# Patient Record
Sex: MALE | Race: WHITE | NOT HISPANIC OR LATINO | Employment: OTHER | URBAN - METROPOLITAN AREA
[De-identification: names, ages, dates, MRNs, and addresses within clinical notes are randomized per-mention and may not be internally consistent; named-entity substitution may affect disease eponyms.]

---

## 2017-06-16 ENCOUNTER — TRANSFERRED RECORDS (OUTPATIENT)
Dept: HEALTH INFORMATION MANAGEMENT | Facility: CLINIC | Age: 62
End: 2017-06-16

## 2017-07-19 ENCOUNTER — TRANSFERRED RECORDS (OUTPATIENT)
Dept: HEALTH INFORMATION MANAGEMENT | Facility: CLINIC | Age: 62
End: 2017-07-19

## 2017-07-19 LAB
CHOLEST SERPL-MCNC: 133 MG/DL (ref 100–199)
HDLC SERPL-MCNC: 41 MG/DL
LDLC SERPL CALC-MCNC: 77 MG/DL (ref 0–99)
TRIGL SERPL-MCNC: 74 MG/DL (ref 0–149)

## 2017-09-08 DIAGNOSIS — R35.0 URINARY FREQUENCY: ICD-10-CM

## 2017-09-08 NOTE — TELEPHONE ENCOUNTER
Reason for Call:  Medication or medication refill:    Do you use a [facility]?  Name of the pharmacy and phone number for the current request:       [facility] - [place], [place] - [address] AT PORTAL TO REGISTERED [facility] SITES    Name of the medication requested: alfuzosin (UROXATRAL) 10 MG 24 hr tablet    Other request: The patient did schedule a Physical 10/5    Can we leave a detailed message on this number? YES    Phone number patient can be reached at: Home number on file [phone] (home)    Best Time: anytime    Call taken on 9/8/2017 at 2:43 PM by Sonali Shahid

## 2017-09-11 RX ORDER — ALFUZOSIN HYDROCHLORIDE 10 MG/1
10 TABLET, EXTENDED RELEASE ORAL DAILY
Qty: 90 TABLET | Refills: 0 | Status: SHIPPED | OUTPATIENT
Start: 2017-09-11 | End: 2017-10-05

## 2017-09-11 NOTE — TELEPHONE ENCOUNTER
alfuzosin (UROXATRAL) 10 MG 24 hr tablet           Last Written Prescription Date: 9/15/2016  Last Fill Quantity: 90, # refills: 3    Last Office Visit with [facility], [facility] or [facility] prescribing provider:  9/15/2016   Future Office Visit:    Next 5 appointments (look out 90 days)     Oct 05, 2017  9:00 AM CDT   [facility] with Wu Jorge MD   [facility]    [address] [facility] [address]   [phone]                  BP Readings from Last 3 Encounters:   09/15/16 138/88   03/09/15 126/84   11/19/13 128/86

## 2017-09-11 NOTE — TELEPHONE ENCOUNTER
Prescription approved per [facility] Refill Protocol.  Further refills to be addressed 10/5/17 with Dr. Jorge.     Karen Mejia RN

## 2017-09-15 ENCOUNTER — TRANSFERRED RECORDS (OUTPATIENT)
Dept: HEALTH INFORMATION MANAGEMENT | Facility: CLINIC | Age: 62
End: 2017-09-15

## 2017-10-04 NOTE — PROGRESS NOTES
SUBJECTIVE:   CC: Mau Gómez is an 62 year old male who presents for preventative health visit.     The patient has reg onc follow up and stable, Dr. Reed.  Has h/o niddm not on meds for that, does not check sugar.  No recent asthma issues.                 Past Medical History:      Past Medical History:   Diagnosis Date     A-fib (H) 2008    ablation therapy     Cavernous hemangioma 11/12    of liver     Chest pain 2002    ct neg for pe but ?liver mass     CML (chronic myelocytic leukaemia) 11/13    Dr. Quinones then Dr. Aramis Reed in [place]     Colonic polyp 2014    [place] gi, fu 3 years     Hemangioma of liver 2003    seen on ct chest then ct liver showed it     HTN (hypertension)      Hx of colonoscopy 2003    nl, fu 2014 2 polyps and to fu 3 years per [place] gi     Hypercholesteremia      Intermittent asthma     had fu Dr. Omid Schwartz and using zithromax in winter and since fine     Kidney stone 7/12     Lung nodule 7/12    seen on ct for kidney stone, fu 1 year, fu done Nov 13 and to fu 1 year, fu done 3/15 and gone, no fu needed     LVH (left ventricular hypertrophy) 2002    echo done for sob     Normal coronary angiogram 2008    done for cp, less then 10% lad, otherwise nl     Shingles 1990's     Type II or unspecified type diabetes mellitus without mention of complication, not stated as uncontrolled              Past Surgical History:      Past Surgical History:   Procedure Laterality Date     APPENDECTOMY       BONE MARROW ASPIRATION ONLY  11/14/2013    Procedure: BONE MARROW ASPIRATION ONLY;  BONE MARROW BIOPSY;  Surgeon: Wilner Salazar MD;  Location:  GI     TONSILLECTOMY       wisdom teeth removed               Social History:     Social History     Social History     Marital status:      Spouse name: N/A     Number of children: 1     Years of education: N/A     Occupational History     commercial real estate [employer]     Social History Main Topics     Smoking status: Former Smoker      Quit date: 12/1/2007     Smokeless tobacco: Never Used      Comment: minimal history     Alcohol use No     Drug use: No     Sexual activity: Yes     Partners: Female     Other Topics Concern     Not on file     Social History Narrative             Family History:   reviewed         Allergies:     Allergies   Allergen Reactions     Alcohol      Aspirin Hives     Codeine Sulfate      Penicillins              Medications:     Current Outpatient Prescriptions   Medication Sig Dispense Refill     alfuzosin (UROXATRAL) 10 MG 24 hr tablet Take 1 tablet (10 mg) by mouth daily 90 tablet 3     pravastatin (PRAVACHOL) 40 MG tablet Take 1 tablet (40 mg) by mouth daily 90 tablet 3     [DISCONTINUED] alfuzosin (UROXATRAL) 10 MG 24 hr tablet Take 1 tablet (10 mg) by mouth daily 90 tablet 0     Cholecalciferol (VITAMIN D3 PO) Take 2,000 Units by mouth daily       ascorbic acid 500 MG TABS        Glucosamine-Chondroitin (GLUCOSAMINE CHONDR COMPLEX PO) Take 1,500 mg by mouth       ACE/ARB NOT PRESCRIBED, INTENTIONAL, continuous prn. ACE & ARB not prescribed due to Other: BP controlled       fexofenadine (ALLEGRA) 180 MG tablet Take 180 mg by mouth daily.       azithromycin (ZITHROMAX) 250 MG tablet Take 250 mg by mouth every other day.       ASPIRIN NOT PRESCRIBED, INTENTIONAL, continuous prn. Antiplatelet medication not prescribed intentionally due to Allergy 0 each 0     Saw Palmetto 160 MG TABS Take  by mouth daily.       Multiple Vitamin (DAILY MULTIVITAMIN PO) Take  by mouth daily.       [DISCONTINUED] pravastatin (PRAVACHOL) 40 MG tablet Take 1 tablet (40 mg) by mouth daily 90 tablet 3     [DISCONTINUED] pravastatin (PRAVACHOL) 40 MG tablet Take 1 tablet (40 mg) by mouth daily Fasting office visit with labs needed before medication runs out. 90 tablet 3     UNABLE TO FIND MEDICATION NAME: gleevac 600 mg daily                 Review of Systems:   The 10 point Review of Systems is negative other than noted in the HPI            "Physical Exam:   Blood pressure 134/88, pulse 87, temperature 97.7  F (36.5  C), temperature source Oral, height 5' 11.4\" (1.814 m), weight 242 lb (109.8 kg), SpO2 97 %.    Exam:  Constitutional: healthy appearing, alert and in no distress  Heent: Normocephalic. Head without obvious masses or lesions. PERRLDC, EOMI. Mouth exam within normal limits: tongue, mucous membranes, posterior pharynx all normal, no lesions or abnormalities seen.  Tm's and canals within normal limits bilaterally. Neck supple, no nuchal rigidity or masses. No supraclavicular, or cervical adenopathy. Thyroid symmetric, no masses.  Cardiovascular: Regular rate and rhythm, no murmer, rub or gallops.  JVP not elevated, no edema.  Carotids within normal limits bilaterally, no bruits.  Respiratory: Normal respiratory effort.  Lungs clear, normal flow, no wheezing or crackles.  Breasts: Normal bilaterally.  No masses or lesions.  Nipples within normal limites.  No axillary lesions or nodes.  Gastrointestinal: Normal active bowel sounds.   Soft, not tender, no masses, guarding or rebound.  No hepatosplenomegaly.   Genitourinary: Rectal mod bph, smooth  Musculoskeletal: extremities normal, no gross deformities noted.  Skin: no suspicious lesions or rashes   Neurologic: Mental status within normal limits.  Speech fluent.  No gross motor abnormalities and gait intact.  Psychiatric: mentation appears normal and affect normal.  Feet within normal limits, no sores, intact pulses         Data:   Labs sent, no cbc as just done        Assessment:   1. Normal cpx  2. Diabetes, not on meds, feet fine and up to date eye exam, exercise, diet and weight loss rec  3. Cml, follow up onc  4. Afib, no recurrence  5. Colon polyp, to follow up this year  6. Lung nodule, no follow up needed  7. Lvh, blood pressure fine  8. Health care maintenance  9. Asthma, controlled  10. Chronic cough, fine on zmax qod         Plan:   Up to date immunizations  Letter with " labs  Exercise, diet and weight loss  Follow up onc  Colonoscopy rec  Call if problems      Wu Jorge M.D.        Healthy Habits:    Do you get at least three servings of calcium containing foods daily (dairy, green leafy vegetables, etc.)? yes    Amount of exercise or daily activities, outside of work: 3 days 60-120mins day(s) per week    Problems taking medications regularly No    Medication side effects: No    Have you had an eye exam in the past two years? yes    Do you see a dentist twice per year? yes    Do you have sleep apnea, excessive snoring or daytime drowsiness?when weight increases Sleep apnea SX's arise            Today's PHQ-2 Score:   PHQ-2 ( 1999 Pfizer) 9/15/2016 11/12/2013   Q1: Little interest or pleasure in doing things 0 0   Q2: Feeling down, depressed or hopeless 0 0   PHQ-2 Score 0 0         Abuse: Current or Past(Physical, Sexual or Emotional)- NO  Do you feel safe in your environment - YES

## 2017-10-05 ENCOUNTER — OFFICE VISIT (OUTPATIENT)
Dept: FAMILY MEDICINE | Facility: CLINIC | Age: 62
End: 2017-10-05
Payer: COMMERCIAL

## 2017-10-05 VITALS
SYSTOLIC BLOOD PRESSURE: 134 MMHG | DIASTOLIC BLOOD PRESSURE: 88 MMHG | HEART RATE: 87 BPM | BODY MASS INDEX: 33.88 KG/M2 | HEIGHT: 71 IN | OXYGEN SATURATION: 97 % | WEIGHT: 242 LBS | TEMPERATURE: 97.7 F

## 2017-10-05 DIAGNOSIS — R05.3 CHRONIC COUGH: ICD-10-CM

## 2017-10-05 DIAGNOSIS — I51.7 LVH (LEFT VENTRICULAR HYPERTROPHY): ICD-10-CM

## 2017-10-05 DIAGNOSIS — J45.20 INTERMITTENT ASTHMA, UNCOMPLICATED: ICD-10-CM

## 2017-10-05 DIAGNOSIS — E66.9 NON MORBID OBESITY: ICD-10-CM

## 2017-10-05 DIAGNOSIS — R35.0 URINARY FREQUENCY: ICD-10-CM

## 2017-10-05 DIAGNOSIS — C92.10 CHRONIC MYELOID LEUKEMIA (H): ICD-10-CM

## 2017-10-05 DIAGNOSIS — I10 BENIGN ESSENTIAL HYPERTENSION: ICD-10-CM

## 2017-10-05 DIAGNOSIS — I48.91 ATRIAL FIBRILLATION, UNSPECIFIED TYPE (H): ICD-10-CM

## 2017-10-05 DIAGNOSIS — E78.5 HYPERLIPIDEMIA LDL GOAL <100: ICD-10-CM

## 2017-10-05 DIAGNOSIS — R91.1 LUNG NODULE: ICD-10-CM

## 2017-10-05 DIAGNOSIS — K63.5 POLYP OF COLON, UNSPECIFIED PART OF COLON, UNSPECIFIED TYPE: ICD-10-CM

## 2017-10-05 DIAGNOSIS — Z23 NEED FOR PROPHYLACTIC VACCINATION AND INOCULATION AGAINST INFLUENZA: ICD-10-CM

## 2017-10-05 DIAGNOSIS — E11.9 TYPE 2 DIABETES MELLITUS WITHOUT COMPLICATION, WITHOUT LONG-TERM CURRENT USE OF INSULIN (H): ICD-10-CM

## 2017-10-05 DIAGNOSIS — Z00.00 ROUTINE GENERAL MEDICAL EXAMINATION AT A HEALTH CARE FACILITY: Primary | ICD-10-CM

## 2017-10-05 LAB
ALBUMIN SERPL-MCNC: 4.1 G/DL (ref 3.4–5)
ALP SERPL-CCNC: 77 U/L (ref 40–150)
ALT SERPL W P-5'-P-CCNC: 43 U/L (ref 0–70)
ANION GAP SERPL CALCULATED.3IONS-SCNC: 8 MMOL/L (ref 3–14)
AST SERPL W P-5'-P-CCNC: 34 U/L (ref 0–45)
BILIRUB SERPL-MCNC: 0.9 MG/DL (ref 0.2–1.3)
BUN SERPL-MCNC: 13 MG/DL (ref 7–30)
CALCIUM SERPL-MCNC: 8.7 MG/DL (ref 8.5–10.1)
CHLORIDE SERPL-SCNC: 107 MMOL/L (ref 94–109)
CHOLEST SERPL-MCNC: 121 MG/DL
CO2 SERPL-SCNC: 25 MMOL/L (ref 20–32)
CREAT SERPL-MCNC: 1.34 MG/DL (ref 0.66–1.25)
CREAT UR-MCNC: 294 MG/DL
GFR SERPL CREATININE-BSD FRML MDRD: 54 ML/MIN/1.7M2
GLUCOSE SERPL-MCNC: 144 MG/DL (ref 70–99)
HBA1C MFR BLD: 5.8 % (ref 4.3–6)
HDLC SERPL-MCNC: 37 MG/DL
LDLC SERPL CALC-MCNC: 60 MG/DL
MICROALBUMIN UR-MCNC: 91 MG/L
MICROALBUMIN/CREAT UR: 31.05 MG/G CR (ref 0–17)
NONHDLC SERPL-MCNC: 84 MG/DL
POTASSIUM SERPL-SCNC: 4 MMOL/L (ref 3.4–5.3)
PROT SERPL-MCNC: 6.6 G/DL (ref 6.8–8.8)
SODIUM SERPL-SCNC: 140 MMOL/L (ref 133–144)
TRIGL SERPL-MCNC: 122 MG/DL

## 2017-10-05 PROCEDURE — 83036 HEMOGLOBIN GLYCOSYLATED A1C: CPT | Performed by: INTERNAL MEDICINE

## 2017-10-05 PROCEDURE — 90686 IIV4 VACC NO PRSV 0.5 ML IM: CPT | Performed by: INTERNAL MEDICINE

## 2017-10-05 PROCEDURE — 80061 LIPID PANEL: CPT | Performed by: INTERNAL MEDICINE

## 2017-10-05 PROCEDURE — 99396 PREV VISIT EST AGE 40-64: CPT | Mod: 25 | Performed by: INTERNAL MEDICINE

## 2017-10-05 PROCEDURE — 82043 UR ALBUMIN QUANTITATIVE: CPT | Performed by: INTERNAL MEDICINE

## 2017-10-05 PROCEDURE — 90471 IMMUNIZATION ADMIN: CPT | Performed by: INTERNAL MEDICINE

## 2017-10-05 PROCEDURE — 80053 COMPREHEN METABOLIC PANEL: CPT | Performed by: INTERNAL MEDICINE

## 2017-10-05 PROCEDURE — 36415 COLL VENOUS BLD VENIPUNCTURE: CPT | Performed by: INTERNAL MEDICINE

## 2017-10-05 RX ORDER — PRAVASTATIN SODIUM 40 MG
40 TABLET ORAL DAILY
Qty: 90 TABLET | Refills: 3 | Status: SHIPPED | OUTPATIENT
Start: 2017-10-05 | End: 2019-01-04

## 2017-10-05 RX ORDER — FOLIC ACID 0.8 MG
1 TABLET ORAL DAILY
COMMUNITY

## 2017-10-05 RX ORDER — ALFUZOSIN HYDROCHLORIDE 10 MG/1
10 TABLET, EXTENDED RELEASE ORAL DAILY
Qty: 90 TABLET | Refills: 3 | Status: SHIPPED | OUTPATIENT
Start: 2017-10-05 | End: 2019-01-04

## 2017-10-05 NOTE — MR AVS SNAPSHOT
After Visit Summary   10/5/2017    Mau Gómez    MRN: [id number]           Patient Information     Date Of Birth          1955        Visit Information        Provider Department      10/5/2017 9:00 AM Wu Jorge MD [facility]        Today's Diagnoses     Routine general medical examination at a health care facility    -  1    Type 2 diabetes mellitus without complication, without long-term current use of insulin (H)        Chronic myeloid leukemia (H)        Atrial fibrillation, unspecified type (H)        Intermittent asthma, uncomplicated        Hyperlipidemia LDL goal <100        LVH (left ventricular hypertrophy)        Benign essential hypertension        Lung nodule        Polyp of colon, unspecified part of colon, unspecified type        Urinary frequency        Non morbid obesity        Need for prophylactic vaccination and inoculation against influenza          Care Instructions      Preventive Health Recommendations  Male Ages 50 - 64    Yearly exam:             See your health care provider every year in order to  o   Review health changes.   o   Discuss preventive care.    o   Review your medicines if your doctor has prescribed any.     Have a cholesterol test every 5 years, or more frequently if you are at risk for high cholesterol/heart disease.     Have a diabetes test (fasting glucose) every three years. If you are at risk for diabetes, you should have this test more often.     Have a colonoscopy at age 50, or have a yearly FIT test (stool test). These exams will check for colon cancer.      Talk with your health care provider about whether or not a prostate cancer screening test (PSA) is right for you.    You should be tested each year for STDs (sexually transmitted diseases), if you re at risk.     Shots: Get a flu shot each year. Get a tetanus shot every 10 years.     Nutrition:    Eat at least 5 servings of fruits and vegetables daily.     Eat  "whole-grain bread, whole-wheat pasta and brown rice instead of white grains and rice.     Talk to your provider about Calcium and Vitamin D.     Lifestyle    Exercise for at least 150 minutes a week (30 minutes a day, 5 days a week). This will help you control your weight and prevent disease.     Limit alcohol to one drink per day.     No smoking.     Wear sunscreen to prevent skin cancer.     See your dentist every six months for an exam and cleaning.     See your eye doctor every 1 to 2 years.            Follow-ups after your visit        Who to contact     If you have questions or need follow up information about today's clinic visit or your schedule please contact [facility] directly at [phone].  Normal or non-critical lab and imaging results will be communicated to you by CelebCallshart, letter or phone within 4 business days after the clinic has received the results. If you do not hear from us within 7 days, please contact the clinic through CelebCallshart or phone. If you have a critical or abnormal lab result, we will notify you by phone as soon as possible.  Submit refill requests through [employer] or call your pharmacy and they will forward the refill request to us. Please allow 3 business days for your refill to be completed.          Additional Information About Your Visit        CelebCallsharTeamly Information     [employer] lets you send messages to your doctor, view your test results, renew your prescriptions, schedule appointments and more. To sign up, go to [email or web address] . Click on \"Log in\" on the left side of the screen, which will take you to the Welcome page. Then click on \"Sign up Now\" on the right side of the page.     You will be asked to enter the access code listed below, as well as some personal information. Please follow the directions to create your username and password.     Your access code is: [email or web address]  Expires: 1/3/2018  9:32 AM     Your access code will  in 90 days. If you need " "help or a new code, please call your [facility] or [phone].        Care EveryWhere ID     This is your Care EveryWhere ID. This could be used by other organizations to access your [facility] medical records  [id number]        Your Vitals Were     Pulse Temperature Height Pulse Oximetry BMI (Body Mass Index)       87 97.7  F (36.5  C) (Oral) 5' 11.4\" (1.814 m) 97% 33.38 kg/m2        Blood Pressure from Last 3 Encounters:   10/05/17 134/88   09/15/16 138/88   03/09/15 126/84    Weight from Last 3 Encounters:   10/05/17 242 lb (109.8 kg)   09/15/16 238 lb (108 kg)   03/09/15 240 lb 6.4 oz (109 kg)              We Performed the Following     Albumin Random Urine Quantitative with Creat Ratio     C FOOT EXAM  NO CHARGE     Comprehensive metabolic panel     HC FLU VAC PRESRV FREE QUAD SPLIT VIR 3+YRS IM     Hemoglobin A1c     Lipid panel reflex to direct LDL          Today's Medication Changes          These changes are accurate as of: 10/5/17  9:32 AM.  If you have any questions, ask your nurse or doctor.               These medicines have changed or have updated prescriptions.        Dose/Directions    pravastatin 40 MG tablet   Commonly known as:  PRAVACHOL   This may have changed:  Another medication with the same name was removed. Continue taking this medication, and follow the directions you see here.   Used for:  Hyperlipidemia LDL goal <100   Changed by:  Wu Jorge MD        Dose:  40 mg   Take 1 tablet (40 mg) by mouth daily   Quantity:  90 tablet   Refills:  3            Where to get your medicines      [facility] [place], [place] - [address] AT Portal to Registered [facility] Sites  [address], [facility] [address]     Phone:  [phone]     alfuzosin 10 MG 24 hr tablet    pravastatin 40 MG tablet                Primary Care Provider Office Phone # Fax #    Wu Jorge [phone] [address] [facility] [address]  [facility] [address]        Equal Access to Services     [place] : Hadii jordan kwan albertinaindira Soguilhermeali, wajagjitda luqerikha, qaleeannta kajackieadiel cheunggiovannaadiel, sixto acevedogaladean day. So [facility] [phone].    ATENCIÓN: Si habla español, tiene a bean disposición servicios gratuitos de asistencia lingüística. Llame al [phone].    We comply with applicable federal civil rights laws and [place] laws. We do not discriminate on the basis of race, color, national origin, age, disability, sex, sexual orientation, or gender identity.            Thank you!     Thank you for choosing [facility]  for your care. Our goal is always to provide you with excellent care. Hearing back from our patients is one way we can continue to improve our services. Please take a few minutes to complete the written survey that you may receive in the mail after your visit with us. Thank you!             Your Updated Medication List - Protect others around you: Learn how to safely use, store and throw away your medicines at www.disposemymeds.org.          This list is accurate as of: 10/5/17  9:32 AM.  Always use your most recent med list.                   Brand Name Dispense Instructions for use Diagnosis    * ACE/ARB NOT PRESCRIBED (INTENTIONAL)      continuous prn. ACE & ARB not prescribed due to Other: BP controlled        alfuzosin 10 MG 24 hr tablet    UROXATRAL    90 tablet    Take 1 tablet (10 mg) by mouth daily    Urinary frequency       ALLEGRA 180 MG tablet   Generic drug:  fexofenadine      Take 180 mg by mouth daily.        ascorbic acid 500 MG Tabs           * ASPIRIN NOT PRESCRIBED    INTENTIONAL    0 each    continuous prn. Antiplatelet medication not prescribed intentionally due to Allergy    Type 2 diabetes, HbA1c goal < 7% (H)       DAILY MULTIVITAMIN PO      Take  by mouth daily.    Intermittent asthma       GLUCOSAMINE CHONDR COMPLEX PO      Take 1,500 mg by mouth        pravastatin 40 MG tablet     PRAVACHOL    90 tablet    Take 1 tablet (40 mg) by mouth daily    Hyperlipidemia LDL goal <100       Saw Palmetto 160 MG Tabs      Take  by mouth daily.    Intermittent asthma       UNABLE TO FIND      MEDICATION NAME: gleevac 600 mg daily        VITAMIN D3 PO      Take 2,000 Units by mouth daily        ZITHROMAX 250 MG tablet   Generic drug:  azithromycin      Take 250 mg by mouth every other day.        * Notice:  This list has 2 medication(s) that are the same as other medications prescribed for you. Read the directions carefully, and ask your doctor or other care provider to review them with you.

## 2017-10-05 NOTE — LETTER
[place] [address] [facility]  [address]  [place]  [address]  Tel: [phone]    October 6, 2017    Mau Gómez  [address] [place] [address]        Dear Mr. Gómez,    For the most part your labs look good but there are a couple of things just a bit off.  Your urine has a small amount of protein in it and your creatinine or kidney test remains a bit high.  This may simply be due to your prior kidney stones and high blood pressure but I would like to do a couple more tests just to be sure.  I would like to get a bit more urine here and then also do a kidney ultrasound at radiology.  If these are fine we can just follow this.    Your sugar is high but the 2nd diabetes test called the a1c is normal.  The bottom line is that you are higher risk of getting diabetes and you need to exercise and get your weight down to lower this.    Your other labs look very good including your blood salts, liver tests, proteins and cholesterol.      If you have any further questions or problems, please contact our office.      Sincerely,    Wu Jorge MD/ Lidia BELLO CMA  Results for orders placed or performed in visit on 10/05/17   Comprehensive metabolic panel   Result Value Ref Range    Sodium 140 133 - 144 mmol/L    Potassium 4.0 3.4 - 5.3 mmol/L    Chloride 107 94 - 109 mmol/L    Carbon Dioxide 25 20 - 32 mmol/L    Anion Gap 8 3 - 14 mmol/L    Glucose 144 (H) 70 - 99 mg/dL    Urea Nitrogen 13 7 - 30 mg/dL    Creatinine 1.34 (H) 0.66 - 1.25 mg/dL    GFR Estimate 54 (L) >60 mL/min/1.7m2    GFR Estimate If Black 65 >60 mL/min/1.7m2    Calcium 8.7 8.5 - 10.1 mg/dL    Bilirubin Total 0.9 0.2 - 1.3 mg/dL    Albumin 4.1 3.4 - 5.0 g/dL    Protein Total 6.6 (L) 6.8 - 8.8 g/dL    Alkaline Phosphatase 77 40 - 150 U/L    ALT 43 0 - 70 U/L    AST 34 0 - 45 U/L   Hemoglobin A1c   Result Value Ref Range    Hemoglobin A1C 5.8 4.3 - 6.0 %   Lipid panel reflex to direct LDL   Result Value Ref Range    Cholesterol  121 <200 mg/dL    Triglycerides 122 <150 mg/dL    HDL Cholesterol 37 (L) >39 mg/dL    LDL Cholesterol Calculated 60 <100 mg/dL    Non HDL Cholesterol 84 <130 mg/dL   Albumin Random Urine Quantitative with Creat Ratio   Result Value Ref Range    Creatinine Urine 294 mg/dL    Albumin Urine mg/L 91 mg/L    Albumin Urine mg/g Cr 31.05 (H) 0 - 17 mg/g Cr               Enclosure: Lab Results

## 2017-10-05 NOTE — NURSING NOTE
"Chief Complaint   Patient presents with     Physical       Initial /88 (BP Location: Left arm, Patient Position: Sitting, Cuff Size: Adult Large)  Pulse 87  Temp 97.7  F (36.5  C) (Oral)  Ht 5' 11.4\" (1.814 m)  Wt 242 lb (109.8 kg)  SpO2 97%  BMI 33.38 kg/m2 Estimated body mass index is 33.38 kg/(m^2) as calculated from the following:    Height as of this encounter: 5' 11.4\" (1.814 m).    Weight as of this encounter: 242 lb (109.8 kg).  Medication Reconciliation: complete   Mlalika Baezing- CMA      "

## 2017-10-05 NOTE — PROGRESS NOTES
Injectable Influenza Immunization Documentation    1.  Is the person to be vaccinated sick today?   No    2. Does the person to be vaccinated have an allergy to a component   of the vaccine?   No    3. Has the person to be vaccinated ever had a serious reaction   to influenza vaccine in the past?   No    4. Has the person to be vaccinated ever had Guillain-Barré syndrome?   No    Form completed by Mallika Britton CMA  Prior to injection verified patient identity using patient's name and date of birth.

## 2017-10-06 NOTE — PROGRESS NOTES
It was a pleasure seeing you.  I wanted to get back to you with your test results.  I have enclosed a copy for your records.    For the most part your labs look good but there are a couple of things just a bit off.  Your urine has a small amount of protein in it and your creatinine or kidney test remains a bit high.  This may simply be due to your prior kidney stones and high blood pressure but I would like to do a couple more tests just to be sure.  I would like to get a bit more urine here and then also do a kidney ultrasound at radiology.  If these are fine we can just follow this.    Your sugar is high but the 2nd diabetes test called the a1c is normal.  The bottom line is that you are higher risk of getting diabetes and you need to exercise and get your weight down to lower this.    Your other labs look very good including your blood salts, liver tests, proteins and cholesterol.      Please work hard on the exercise and weight.  I would like to order the kidney ultrasound and urine study but I wanted to wait for you to get this letter first so please call me and let me know you got it and that it is ok to order these things.    If you have any questions please call me.

## 2017-11-29 DIAGNOSIS — R35.0 URINARY FREQUENCY: ICD-10-CM

## 2017-11-30 RX ORDER — ALFUZOSIN HYDROCHLORIDE 10 MG/1
TABLET, EXTENDED RELEASE ORAL
Qty: 90 TABLET | Refills: 0 | OUTPATIENT
Start: 2017-11-30

## 2018-01-14 ENCOUNTER — TRANSFERRED RECORDS (OUTPATIENT)
Dept: HEALTH INFORMATION MANAGEMENT | Facility: CLINIC | Age: 63
End: 2018-01-14

## 2018-01-26 ENCOUNTER — TRANSFERRED RECORDS (OUTPATIENT)
Dept: HEALTH INFORMATION MANAGEMENT | Facility: CLINIC | Age: 63
End: 2018-01-26

## 2018-06-18 ENCOUNTER — TRANSFERRED RECORDS (OUTPATIENT)
Dept: HEALTH INFORMATION MANAGEMENT | Facility: CLINIC | Age: 63
End: 2018-06-18

## 2018-09-06 ENCOUNTER — OFFICE VISIT (OUTPATIENT)
Dept: FAMILY MEDICINE | Facility: CLINIC | Age: 63
End: 2018-09-06
Payer: MEDICARE

## 2018-09-06 VITALS
DIASTOLIC BLOOD PRESSURE: 99 MMHG | BODY MASS INDEX: 32.37 KG/M2 | OXYGEN SATURATION: 96 % | HEART RATE: 79 BPM | HEIGHT: 72 IN | TEMPERATURE: 97.3 F | WEIGHT: 239 LBS | SYSTOLIC BLOOD PRESSURE: 154 MMHG

## 2018-09-06 DIAGNOSIS — M79.671 PAIN OF RIGHT HEEL: Primary | ICD-10-CM

## 2018-09-06 DIAGNOSIS — M26.609 TMJ (TEMPOROMANDIBULAR JOINT SYNDROME): ICD-10-CM

## 2018-09-06 DIAGNOSIS — M79.9 SOFT TISSUE LESION: ICD-10-CM

## 2018-09-06 PROCEDURE — 99214 OFFICE O/P EST MOD 30 MIN: CPT | Performed by: NURSE PRACTITIONER

## 2018-09-06 ASSESSMENT — PAIN SCALES - GENERAL: PAINLEVEL: MILD PAIN (2)

## 2018-09-06 NOTE — MR AVS SNAPSHOT
After Visit Summary   9/6/2018    Mau Gómez    MRN: [id number]           Patient Information     Date Of Birth          1955        Visit Information        Provider Department      9/6/2018 10:30 AM Natalie Nelson APRN [facility]        Today's Diagnoses     Pain of right heel    -  1    Soft tissue lesion           Follow-ups after your visit        Additional Services     GENERAL SURG ADULT REFERRAL       Your provider [place] [facility] [facility] [phone]   [email or web address]    Please be aware that coverage of these services is subject to the terms and limitations of your health insurance plan.  Call member services at your health plan with any benefit or coverage questions.      Please bring the following with you to your appointment:    (1) Any X-Rays, CTs or MRIs which have been performed.  Contact the facility where they were done to arrange for  prior to your scheduled appointment.   (2) List of current medications   (3) This referral request   (4) Any documents/labs given to you for this referral            PODIATRY/FOOT & ANKLE SURGERY REFERRAL       Your provider [place] you [place] [facility] [facility] [phone]   [email or web address]    Please be aware that coverage of these services is subject to the terms and limitations of your health insurance plan.  Call member services at your health plan with any benefit or coverage questions.      Please bring the following to your appointment:  >>   Any x-rays, CTs or MRIs which have been performed.  Contact the facility where they were done to arrange for  prior to your scheduled appointment.    >>   List of current medications   >>   This referral request   >>   Any documents/labs given to you for this referral                  Who to contact     If you have questions or need follow up  "information about today's clinic visit or your schedule please contact [facility] directly at [phone].  Normal or non-critical lab and imaging results will be communicated to you by MyChart, letter or phone within 4 business days after the clinic has received the results. If you do not hear from us within 7 days, please contact the clinic through MyChart or phone. If you have a critical or abnormal lab result, we will notify you by phone as soon as possible.  Submit refill requests through [employer] or call your pharmacy and they will forward the refill request to us. Please allow 3 business days for your refill to be completed.          Additional Information About Your Visit        Care EveryWhere ID     This is your Care EveryWhere ID. This could be used by other organizations to access your [place] medical records  [id number]        Your Vitals Were     Pulse Temperature Height Pulse Oximetry BMI (Body Mass Index)       79 97.3  F (36.3  C) (Tympanic) 5' 11.5\" (1.816 m) 96% 32.87 kg/m2        Blood Pressure from Last 3 Encounters:   09/06/18 (!) 154/99   10/05/17 134/88   09/15/16 138/88    Weight from Last 3 Encounters:   09/06/18 239 lb (108.4 kg)   10/05/17 242 lb (109.8 kg)   09/15/16 238 lb (108 kg)              We Performed the Following     GENERAL SURG ADULT REFERRAL     PODIATRY/FOOT & ANKLE SURGERY REFERRAL        Primary Care Provider Office Phone # Fax #    Wu Jorge [phone] [address] [address]        Equal Access to Services     [facility]: Hadii aad ku hadasho Soomaali, waaxda luqadaha, qaybta kaalmada sixto taylor. So [facility] [phone].    ATENCIÓN: Si habla español, tiene a bean disposición servicios gratuitos de asistencia lingüística. Muna al [phone].    We comply with applicable federal civil rights laws and [place] laws. We do not discriminate on the basis of race, " color, national origin, age, disability, sex, sexual orientation, or gender identity.            Thank you!     Thank you for choosing [facility]  for your care. Our goal is always to provide you with excellent care. Hearing back from our patients is one way we can continue to improve our services. Please take a few minutes to complete the written survey that you may receive in the mail after your visit with us. Thank you!             Your Updated Medication List - Protect others around you: Learn how to safely use, store and throw away your medicines at www.disposemymeds.org.          This list is accurate as of 9/6/18 11:13 AM.  Always use your most recent med list.                   Brand Name Dispense Instructions for use Diagnosis    * ACE/ARB/ARNI NOT PRESCRIBED (INTENTIONAL)      continuous prn. ACE & ARB not prescribed due to Other: BP controlled        alfuzosin 10 MG 24 hr tablet    UROXATRAL    90 tablet    Take 1 tablet (10 mg) by mouth daily    Urinary frequency       ALLEGRA 180 MG tablet   Generic drug:  fexofenadine      Take 180 mg by mouth daily.        ascorbic acid 500 MG Tabs           * ASPIRIN NOT PRESCRIBED    INTENTIONAL    0 each    continuous prn. Antiplatelet medication not prescribed intentionally due to Allergy    Type 2 diabetes, HbA1c goal < 7% (H)       DAILY MULTIVITAMIN PO      Take  by mouth daily.    Intermittent asthma       GLUCOSAMINE CHONDR COMPLEX PO      Take 1,500 mg by mouth        Magnesium 500 MG Caps      Take 1 tablet by mouth daily        pravastatin 40 MG tablet    PRAVACHOL    90 tablet    Take 1 tablet (40 mg) by mouth daily    Hyperlipidemia LDL goal <100       Saw Palmetto 160 MG Tabs      Take  by mouth daily.    Intermittent asthma       UNABLE TO FIND      MEDICATION NAME: gleevac 600 mg daily        VITAMIN D3 PO      Take 2,000 Units by mouth daily        ZITHROMAX 250 MG tablet   Generic drug:  azithromycin      Take 250 mg by mouth every other  day.        * Notice:  This list has 2 medication(s) that are the same as other medications prescribed for you. Read the directions carefully, and ask your doctor or other care provider to review them with you.

## 2018-09-06 NOTE — PROGRESS NOTES
"HPI    SUBJECTIVE:   Mau Gómez is a 63 year old male who presents to clinic today for the following health issues:      Right heel burning pain; sore to touch.   No alleviating factors; same time everyday; walking and running elicits the same pain  No known injury related to cause  Takes no meds for it    \"Cracking in jaw\"; yawning or opening mouth wide. Started 3 months ago  No headache   Denies bruxism or clenching at night.   Denies tooth pain; recent dentist appot; not structural  No pain    \"Fat lump on right side\" axillary/breast area for a long time. Was told he could have it removed if it ever bothers him  Dull pain/burn started 8 days ago  No overlying rash   Denies numbness and tingling in arm  No effects of ROM  Lump is soft and mobile  No rib pain  No medication used for discomfort      Past Medical History:   Diagnosis Date     A-fib (H) 2008    ablation therapy     Cavernous hemangioma 11/12    of liver     Chest pain 2002    ct neg for pe but ?liver mass     Chronic cough     on zmax qod via Dr Schwartz     CML (chronic myelocytic leukaemia) 11/13    Dr. Quinones then Dr. Aramis Reed in [place]     Colonic polyp 2014    [place] gi, fu 3 years     Hemangioma of liver 2003    seen on ct chest then ct liver showed it     HTN (hypertension)      Hx of colonoscopy 2003    nl, fu 2014 2 polyps and to fu 3 years per [place] gi     Hypercholesteremia      Intermittent asthma     had fu Dr. Omdi Schwartz and using zithromax in winter and since fine     Kidney stone 7/12     Lung nodule 7/12    seen on ct for kidney stone, fu 1 year, fu done Nov 13 and to fu 1 year, fu done 3/15 and gone, no fu needed     LVH (left ventricular hypertrophy) 2002    echo done for sob     Normal coronary angiogram 2008    done for cp, less then 10% lad, otherwise nl     Juan 1990's     Type II or unspecified type diabetes mellitus without mention of complication, not stated as uncontrolled      Family History   Problem Relation Age of " Onset     Cancer Father      melanoma     Cancer Mother      mantel cell ca, bladder--passed away in 8/2016     Medical History Unknown Maternal Grandfather      Past Surgical History:   Procedure Laterality Date     APPENDECTOMY       BONE MARROW ASPIRATION ONLY  11/14/2013    Procedure: BONE MARROW ASPIRATION ONLY;  BONE MARROW BIOPSY;  Surgeon: Wilner Salazar MD;  Location:  GI     TONSILLECTOMY       wisdom teeth removed       Social History   Substance Use Topics     Smoking status: Former Smoker     Quit date: 12/1/2007     Smokeless tobacco: Never Used      Comment: minimal history     Alcohol use No     Current Outpatient Prescriptions   Medication Sig Dispense Refill     ACE/ARB NOT PRESCRIBED, INTENTIONAL, continuous prn. ACE & ARB not prescribed due to Other: BP controlled       alfuzosin (UROXATRAL) 10 MG 24 hr tablet Take 1 tablet (10 mg) by mouth daily 90 tablet 3     ascorbic acid 500 MG TABS        ASPIRIN NOT PRESCRIBED, INTENTIONAL, continuous prn. Antiplatelet medication not prescribed intentionally due to Allergy 0 each 0     azithromycin (ZITHROMAX) 250 MG tablet Take 250 mg by mouth every other day.       Cholecalciferol (VITAMIN D3 PO) Take 2,000 Units by mouth daily       fexofenadine (ALLEGRA) 180 MG tablet Take 180 mg by mouth daily.       Glucosamine-Chondroitin (GLUCOSAMINE CHONDR COMPLEX PO) Take 1,500 mg by mouth       Magnesium 500 MG CAPS Take 1 tablet by mouth daily       Multiple Vitamin (DAILY MULTIVITAMIN PO) Take  by mouth daily.       pravastatin (PRAVACHOL) 40 MG tablet Take 1 tablet (40 mg) by mouth daily 90 tablet 3     Saw Palmetto 160 MG TABS Take  by mouth daily.       UNABLE TO FIND MEDICATION NAME: gleevac 600 mg daily       Allergies   Allergen Reactions     Alcohol      Aspirin Hives     Codeine Sulfate      Penicillins        Reviewed and updated as needed this visit by clinical staff and provider     ROS  Detailed as above      BP (!) 154/99 (BP Location:  "Right arm, Cuff Size: Adult Regular)  Pulse 79  Temp 97.3  F (36.3  C) (Tympanic)  Ht 5' 11.5\" (1.816 m)  Wt 239 lb (108.4 kg)  SpO2 96%  BMI 32.87 kg/m2      Physical Exam   Constitutional: He is well-developed, well-nourished, and in no distress.   HENT:   Head: Normocephalic.   Audible Crepitus noted with opening of mouth. No pain   Eyes: Conjunctivae are normal.   Neck: Normal range of motion.   Pulmonary/Chest: Effort normal.   Musculoskeletal: Normal range of motion. He exhibits no edema.   Right mid plantar heel tender to palpation. No erythema or ecchymosis   Lymphadenopathy:     He has no cervical adenopathy.   Neurological: He is alert.   Skin: Skin is warm and dry. No rash noted. No erythema.   Small sized possible soft tissue lesion in right lateral breast/side area. No erythema noted. Lesion is soft and mobile.   Psychiatric: Mood and affect normal.       Assessment and Plan:       ICD-10-CM    1. Pain of right heel M79.671 PODIATRY/FOOT & ANKLE SURGERY REFERRAL   2. Soft tissue lesion M79.9 GENERAL SURG ADULT REFERRAL   3. TMJ (temporomandibular joint syndrome) M26.609        Right heel pain of unknown etiology. Referral placed for Podiatry to further assess concerns.    Right lateral breast/side appears to be a lipoma or adipose tissue. Referral placed for General Surgery    Crepitus of the TMJ. Denies pain. Encourage to minimize overue:  Rest, no gum chewing/ jaw breakers/ repetitive motion.  If concern persists may consider use of physical therapy.     Instructed to call with other questions or concerns.      Pt seen in conjunction with Maryana Ghotra NP Student     KASSIE Barakat, CNP  [facility]            "

## 2018-09-10 ENCOUNTER — OFFICE VISIT (OUTPATIENT)
Dept: SURGERY | Facility: CLINIC | Age: 63
End: 2018-09-10
Payer: MEDICARE

## 2018-09-10 VITALS
OXYGEN SATURATION: 98 % | HEIGHT: 72 IN | HEART RATE: 102 BPM | WEIGHT: 239 LBS | SYSTOLIC BLOOD PRESSURE: 110 MMHG | DIASTOLIC BLOOD PRESSURE: 72 MMHG | BODY MASS INDEX: 32.37 KG/M2

## 2018-09-10 DIAGNOSIS — D17.1 LIPOMA OF CHEST WALL: Primary | ICD-10-CM

## 2018-09-10 PROCEDURE — 99203 OFFICE O/P NEW LOW 30 MIN: CPT | Performed by: SURGERY

## 2018-09-10 NOTE — LETTER
2018    RE: Mau Gómez, : 1955        Consult       Painful right axillary lump.         HISTORY OF PRESENT ILLNESS:  Mau Gómez is a 63 year old male who is seen in consultation at the request of Dr. Jorge for evaluation of painful right axillary lump.  Mr. Gómez has been aware of this lipoma-like lesion for many years.  Recently it has enlarged in size and has become quite tender.  He can be sore for hours or on and off depending on his activity.  No previous resections, no other lumps felt elsewhere on his body.     REVIEW OF SYSTEMS:  Constitutional:  Negative for chills, fatigue, fever and weight change.  Neuro: No extremity, nor facial weakness  Psych:  No unexpected changes in mood  Eyes:  Negative for new vision problems.  ENT:  Negative for ENT pain.  Cardiovascular:  Negative for chest pain, palpitations.  Respiratory:  Negative for cough, dyspnea.  Gastrointestinal:  Negative for abdominal pain.     Musculoskeletal:  Negative for new arthralgias or myalgias.  Integumentary: Positive for palpable mass as in HPI        Vitals: /72 (BP Location: Left arm, Patient Position: Sitting, Cuff Size: Adult Regular)  Pulse 102  Ht 6' (1.829 m)  Wt 239 lb (108.4 kg)  SpO2 98%  BMI 32.41 kg/m2  BMI= Body mass index is 32.41 kg/(m^2).     EXAM:  GENERAL: healthy, alert and no distress      HEENT: moist mucus membranes, no scleral icterus,   CARDIOVASCULAR:  RRR, No JVD  NEURO:  Alert;  well oriented to time, place and person.  RESPIRATORY: non labored breathing  NECK: Neck supple. No noticeable masses.  No lymphadenopathy noted.  ABDOMEN/GI: Benign  EXTREMITIES: warm and well perfused, no edema  SKIN: No suspicious lesions or rashes, along the anterior right axillary line and thyroid his chest there is a multilobulated rubbery lump that measures anywhere from 6-5-3 cm, the area is slightly reddened.  Skin is intact otherwise.  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     Mau Gómez understands the risk, benefits, hopeful outcomes, and possible complications, both in the short and in the long term.  All his questions answered, he will like to proceed with the propose procedure in the near future.     It is my pleasure to participate in the care of Mau Gómez. Thank you for this consultation.      If you have any questions please give me a call.     Best regards,  Aneudy Bartholomew MD

## 2018-09-10 NOTE — MR AVS SNAPSHOT
After Visit Summary   9/10/2018    Mau Gómez    MRN: [id number]           Patient Information     Date Of Birth          1955        Visit Information        Provider Department      9/10/2018 9:00 AM Aneudy Bartholomew MD [facility] General Surgery       Follow-ups after your visit        Your next 10 appointments already scheduled     Sep 18, 2018  8:30 AM CDT   New Visit with Nicolas Soto DPM   [facility]    [address] [facility] [address] [facility] [address] [phone]              Who to contact     If you have questions or need follow up information about today's clinic visit or your schedule please contact [facility] directly at [phone].  Normal or non-critical lab and imaging results will be communicated to you by MyChart, letter or phone within 4 business days after the clinic has received the results. If you do not hear from us within 7 days, please contact the clinic through MyChart or phone. If you have a critical or abnormal lab result, we will notify you by phone as soon as possible.  Submit refill requests through [employer] or call your pharmacy and they will forward the refill request to us. Please allow 3 business days for your refill to be completed.          Additional Information About Your Visit        Care EveryWhere ID     This is your Care EveryWhere ID. This could be used by other organizations to access your [place] medical records  [id number]        Your Vitals Were     Pulse Height Pulse Oximetry BMI (Body Mass Index)          102 6' (1.829 m) 98% 32.41 kg/m2         Blood Pressure from Last 3 Encounters:   09/10/18 110/72   09/06/18 (!) 154/99   10/05/17 134/88    Weight from Last 3 Encounters:   09/10/18 239 lb (108.4 kg)   09/06/18 239 lb (108.4 kg)   10/05/17 242 lb (109.8 kg)              Today, you had the following     No orders found for display        Primary Care Provider Office Phone # Fax #    Wu Jorge [phone] [address] [facility] [address]  [place] [address]        Equal Access to Services     [place] : Gris kwan albertinao Soguilhermeali, waaxda luqadaha, qaybta kaalmada adeviktorda, sixto rowland laFabriceedwin day. So [facility] [phone].    ATENCIÓN: Si habla español, tiene a bean disposición servicios gratuitos de asistencia lingüística. Llame al [phone].    We comply with applicable federal civil rights laws and [place] laws. We do not discriminate on the basis of race, color, national origin, age, disability, sex, sexual orientation, or gender identity.            Thank you!     Thank you for choosing [facility]  for your care. Our goal is always to provide you with excellent care. Hearing back from our patients is one way we can continue to improve our services. Please take a few minutes to complete the written survey that you may receive in the mail after your visit with us. Thank you!             Your Updated Medication List - Protect others around you: Learn how to safely use, store and throw away your medicines at www.disposemymeds.org.          This list is accurate as of 9/10/18  9:17 AM.  Always use your most recent med list.                   Brand Name Dispense Instructions for use Diagnosis    * ACE/ARB/ARNI NOT PRESCRIBED (INTENTIONAL)      continuous prn. ACE & ARB not prescribed due to Other: BP controlled        alfuzosin 10 MG 24 hr tablet    UROXATRAL    90 tablet    Take 1 tablet (10 mg) by mouth daily    Urinary frequency       ALLEGRA 180 MG tablet   Generic drug:  fexofenadine      Take 180 mg by mouth daily.        ascorbic acid 500 MG Tabs           * ASPIRIN NOT PRESCRIBED    INTENTIONAL    0 each    continuous prn. Antiplatelet medication not prescribed intentionally due to Allergy    Type 2 diabetes, HbA1c goal < 7% (H)       DAILY MULTIVITAMIN PO      Take  by mouth  daily.    Intermittent asthma       GLUCOSAMINE CHONDR COMPLEX PO      Take 1,500 mg by mouth        Magnesium 500 MG Caps      Take 1 tablet by mouth daily        pravastatin 40 MG tablet    PRAVACHOL    90 tablet    Take 1 tablet (40 mg) by mouth daily    Hyperlipidemia LDL goal <100       Saw Palmetto 160 MG Tabs      Take  by mouth daily.    Intermittent asthma       UNABLE TO FIND      MEDICATION NAME: gleevac 600 mg daily        VITAMIN D3 PO      Take 2,000 Units by mouth daily        ZITHROMAX 250 MG tablet   Generic drug:  azithromycin      Take 250 mg by mouth every other day.        * Notice:  This list has 2 medication(s) that are the same as other medications prescribed for you. Read the directions carefully, and ask your doctor or other care provider to review them with you.

## 2018-09-11 ENCOUNTER — TELEPHONE (OUTPATIENT)
Dept: SURGERY | Facility: CLINIC | Age: 63
End: 2018-09-11

## 2018-09-11 NOTE — TELEPHONE ENCOUNTER
Type of surgery: Excision right axillary lipoma   Location of surgery: [facility] OR  Date and time of surgery: 9/20/18 at 3pm  Surgeon: Dr. Aneudy Bartholomew   Pre-Op Appt Date: Patient to schedule  Post-Op Appt Date: Patient to schedule   Packet sent out: Yes  Pre-cert/Authorization completed:  Not Applicable  Date: 9/10/18

## 2018-09-13 NOTE — PROGRESS NOTES
[facility] Consultation    CHIEF COMPLAINT:  Chief Complaint   Patient presents with     Consult     Painful right axillary lump.       HISTORY OF PRESENT ILLNESS:  Mau Gómez is a 63 year old male who is seen in consultation at the request of Dr. Jorge for evaluation of painful right axillary lump.  Mr. Gómez has been aware of this lipoma-like lesion for many years.  Recently it has enlarged in size and has become quite tender.  He can be sore for hours or on and off depending on his activity.  No previous resections, no other lumps felt elsewhere on his body.    REVIEW OF SYSTEMS:  Constitutional:  Negative for chills, fatigue, fever and weight change.  Neuro: No extremity, nor facial weakness  Psych:  No unexpected changes in mood  Eyes:  Negative for new vision problems.  ENT:  Negative for ENT pain.  Cardiovascular:  Negative for chest pain, palpitations.  Respiratory:  Negative for cough, dyspnea.  Gastrointestinal:  Negative for abdominal pain.     Musculoskeletal:  Negative for new arthralgias or myalgias.  Integumentary: Positive for palpable mass as in HPI    Past Medical History:   Diagnosis Date     A-fib (H) 2008    ablation therapy     Cavernous hemangioma 11/12    of liver     Chest pain 2002    ct neg for pe but ?liver mass     Chronic cough     on zmax qod via Dr Schwartz     CML (chronic myelocytic leukaemia) 11/13    Dr. Quinones then Dr. Aramis Reed in [place]     Colonic polyp 2014    [place] gi, fu 3 years     Hemangioma of liver 2003    seen on ct chest then ct liver showed it     HTN (hypertension)      Hx of colonoscopy 2003    nl, fu 2014 2 polyps and to fu 3 years per [facility] gi     Hypercholesteremia      Intermittent asthma     had fu Dr. Omid Schwartz and using zithromax in winter and since fine     Kidney stone 7/12     Lung nodule 7/12    seen on ct for kidney stone, fu 1 year, fu done Nov 13 and to fu 1 year, fu done 3/15 and gone, no fu needed     LVH (left ventricular  hypertrophy) 2002    echo done for sob     Normal coronary angiogram 2008    done for cp, less then 10% lad, otherwise nl     Shingles 1990's     Type II or unspecified type diabetes mellitus without mention of complication, not stated as uncontrolled        Past Surgical History:   Procedure Laterality Date     APPENDECTOMY       BONE MARROW ASPIRATION ONLY  11/14/2013    Procedure: BONE MARROW ASPIRATION ONLY;  BONE MARROW BIOPSY;  Surgeon: Wilner Salazar MD;  Location:  GI     TONSILLECTOMY       wisdom teeth removed         Family History has been reviewed.    Social History   Substance Use Topics     Smoking status: Former Smoker     Quit date: 12/1/2007     Smokeless tobacco: Never Used      Comment: minimal history     Alcohol use No       Patient Active Problem List   Diagnosis     Benign essential hypertension     Type 2 diabetes mellitus without complications (H)     LVH (left ventricular hypertrophy)     Hemangioma of liver     Hyperlipidemia LDL goal <100     Lung nodule     Chronic myeloid leukemia (H)     Colonic polyp     Atrial fibrillation, unspecified type (H)     Intermittent asthma, uncomplicated     Non morbid obesity     Chronic cough       Allergies   Allergen Reactions     Alcohol      Aspirin Hives     Codeine Sulfate      Penicillins        Current Outpatient Prescriptions   Medication Sig Dispense Refill     ACE/ARB NOT PRESCRIBED, INTENTIONAL, continuous prn. ACE & ARB not prescribed due to Other: BP controlled       alfuzosin (UROXATRAL) 10 MG 24 hr tablet Take 1 tablet (10 mg) by mouth daily 90 tablet 3     ascorbic acid 500 MG TABS        ASPIRIN NOT PRESCRIBED, INTENTIONAL, continuous prn. Antiplatelet medication not prescribed intentionally due to Allergy 0 each 0     azithromycin (ZITHROMAX) 250 MG tablet Take 250 mg by mouth every other day.       Cholecalciferol (VITAMIN D3 PO) Take 2,000 Units by mouth daily       fexofenadine (ALLEGRA) 180 MG tablet Take 180 mg by  mouth daily.       Glucosamine-Chondroitin (GLUCOSAMINE CHONDR COMPLEX PO) Take 1,500 mg by mouth       Magnesium 500 MG CAPS Take 1 tablet by mouth daily       Multiple Vitamin (DAILY MULTIVITAMIN PO) Take  by mouth daily.       pravastatin (PRAVACHOL) 40 MG tablet Take 1 tablet (40 mg) by mouth daily 90 tablet 3     Saw Palmetto 160 MG TABS Take  by mouth daily.       UNABLE TO FIND MEDICATION NAME: gleevac 600 mg daily         Vitals: /72 (BP Location: Left arm, Patient Position: Sitting, Cuff Size: Adult Regular)  Pulse 102  Ht 6' (1.829 m)  Wt 239 lb (108.4 kg)  SpO2 98%  BMI 32.41 kg/m2  BMI= Body mass index is 32.41 kg/(m^2).    EXAM:  GENERAL: healthy, alert and no distress     HEENT: moist mucus membranes, no scleral icterus,   CARDIOVASCULAR:  RRR, No JVD  NEURO:  Alert;  well oriented to time, place and person.  RESPIRATORY: non labored breathing  NECK: Neck supple. No noticeable masses.  No lymphadenopathy noted.  ABDOMEN/GI: Benign  EXTREMITIES: warm and well perfused, no edema  SKIN: No suspicious lesions or rashes, along the anterior right axillary line and thyroid his chest there is a multilobulated rubbery lump that measures anywhere from 6-5-3 cm, the area is slightly reddened.  Skin is intact otherwise.  LYMPH: Normal axillary lymph nodes    LABS/Imaging: None to review at this time    ASSESSMENT:  Mau Gómez suffers from multilobulated right axillary/chest lipoma     PLAN:  Excision of multilobulated right axillary/chest lipoma.    Mau Gómez understands the risk, benefits, hopeful outcomes, and possible complications, both in the short and in the long term.  All his questions answered, he will like to proceed with the propose procedure in the near future.    It is my pleasure to participate in the care of Mau Gómez. Thank you for this consultation.     If you have any questions please give me a call.    Best regards,  Aneudy Bartholomew MD    Please route or send letter  to:  Primary Care Provider (PCP), Referring Provider and Include Progress Note    Total time with patient visit: 30 minutes more than half spent in counseling, explanation of procedures and coordination of care.'

## 2018-09-18 ENCOUNTER — OFFICE VISIT (OUTPATIENT)
Dept: PODIATRY | Facility: CLINIC | Age: 63
End: 2018-09-18
Payer: MEDICARE

## 2018-09-18 VITALS
BODY MASS INDEX: 32.37 KG/M2 | SYSTOLIC BLOOD PRESSURE: 128 MMHG | DIASTOLIC BLOOD PRESSURE: 84 MMHG | WEIGHT: 239 LBS | HEIGHT: 72 IN

## 2018-09-18 DIAGNOSIS — M79.671 PAIN OF RIGHT HEEL: Primary | ICD-10-CM

## 2018-09-18 DIAGNOSIS — M72.2 PLANTAR FASCIITIS: ICD-10-CM

## 2018-09-18 PROCEDURE — 99203 OFFICE O/P NEW LOW 30 MIN: CPT | Performed by: PODIATRIST

## 2018-09-18 ASSESSMENT — PAIN SCALES - GENERAL: PAINLEVEL: MODERATE PAIN (5)

## 2018-09-18 NOTE — PATIENT INSTRUCTIONS
Thank you for choosing [facility]!    Follow up as needed     DR. RAMOS'S CLINIC LOCATIONS     MONDAY  [place] TUESDAY & FRIDAY AM  [place]   [address] [facility]   [address]   [place] [address] [place] [address]   [phone]  [phone]       WEDNESDAY  [place] SCHEDULE SURGERY: [phone]   [address] [place] [address] APPOINTMENTS: [phone]   [place] [id number] BILLING QUESTIONS: [phone]   [phone]       PLANTAR FASCIITIS  Plantar fasciitis is often referred to as heel spurs or heel pain. Plantar fasciitis is a very common problem that affects people of all foot shapes, age, weight and activity level. Pain may be in the arch or on the weight-bearing surface of the heel. The pain may come on without injury or identifiable cause. Pain is generally present when first getting out of bed in the morning or up from a seated break.     CAUSES  The plantar fascia is a dense fibrous band of tissue that stretches across the bottom surface of the foot. The fascia helps support the foot muscles and arch. Plantar fasciitis is thought to be caused by mechanical strain or overload. Frequent walking without shoes or wearing unsupportive shoes is thought to cause structural overload and ultimately inflammation of the plantar fascia. Some people have heel spurs that can be seen on x-ray. The heel spur is actually a minor component of plantar fascitis and is largely ignored.       SELF TREATMENT   The easiest solution is to stop walking around your home without shoes. Plantar fasciitis is largely a shoe problem. Shoes are either not being worn often enough or your current shoes are inadequate for your weight, foot structure or activity level. The majority of shoes on the market today are not sufficient to resist development of plantar fasciitis or to promote healing. Assume that your current shoes are  inadequate and will need to be replaced. Even high quality shoes wear out with 6 months to one year of frequent use. Weight loss is another option. Losing ten pounds in the next two months may be enough to resolve the problem. Ice applied to the area of pain two to three times per day for ten minutes each session can be very helpful. Warm foot soaks in epsom salts can also relieve pain. This should continue until the problem resolves. Achilles tendon stretching is essential. Stretch multiple times daily to promote healing and to prevent recurrence in the future. Over all stretching of the body is helpful as well such as the calves, thighs and lower back. Normally when one area of the body is tight, other areas are too. Gentle Yoga can be good for this.     Over the counter topical anti inflammatories can be helpful such as biofreeze, bengay, salon pas, ect...  Oral ibuprofen or aleve is recommended as well to try to calm down inflammation.     Night splints can be helpful to gradually stretch the foot at night as a lot of pain is when you get up in the morning. Taking a towel or thera band and stretching the foot back multiple times before you get ou of bed can be beneficial as well.     MEDICAL TREATMENT  Medical treatments often include custom arch supports, cortisone injections, physical therapy, splints to be worn in bed, prescription medications and surgery. The home treatments listed above will be necessary regardless of these advanced medical treatments. Surgery is rarely needed but is very helpful in selected cases.     PROGNOSIS  Plantar fasciitis can last from one day to a lifetime. Some people get intermittent fascitis that is very short-lived. Others suffer daily for years. Excessive body weight, frequent bare foot walking, long hours on the feet, inadequate shoes, predisposing foot structures and excessive activity such as running are all potential issues that lead to chronic and/or recurring plantar  fascitis. Having plantar fasciitis means that you are forever prone to this problem and will require modification of some of the above factors. Most people seek treatment within one to four months. Healing usually requires a similar one to four month time frame. Healing time is relative to the amount of effort spent treating the problem.   Plantar fasciitis is highly recurrent. Risk factors often continue, including return to bare foot walking, inadequate shoes, excessive body weight, excessive activities, etc. Your life style and foot structure may predispose you to recurrent plantar fasciitis. A daily prevention regimen can be very helpful. Ongoing use of shoe inserts, careful attention to appropriate shoes, daily Achilles stretching, etc. may prevent recurrence. Prompt attention at the earliest warning signs of heel pain can resolve the problem in as short as a few days.     EXERCISES  Stair Exercise: Step on the stairs with the ball of your foot and hold your position for at least 15 seconds, then slowly step down with the heels of your foot. You can do this daily and as often as you want.   Picking the Towel: Sit comfortably and then pick the towel up with your toes. You can use any object other than a towel as long as the material can be soft and you can pick it up with your toes.  Rolling the Bottle: Use a small ball or frozen water bottle and then roll it around with your foot.   Flex the Toes: Sit comfortably and then flex your toes by pointing it towards the floor or towards your body. This will relax and flex your foot and exercise your plantar fascia, the calf, and the Achilles tendon. The inability of the foot to stretch often causes the bunching up of the plantar fascia area leading to the pain.  Calf/Achilles Stretching: Lay on you back and raise one foot, then point your toes towards the floor. See photo below:               Hold each stretch for 10 seconds. Stretch 10 times per set, three sets per  day. Morning, afternoon and evening. If your heel pain is very severe in the morning, consider doing the first set of stretches before you get out of bed.      OVER THE COUNTER INSERT RECOMMENDATIONS  SuperFeet   Sofsole Fit Spenco   Power Step   Walk-Fit Arch Cradles     Most of these can be found at your local [employer], [employer], or online.  **A good high quality over the counter insert should cost around $40-$50      [employer] LOCATIONS  [place]  [address] [facility]  [phone]   [address]  [phone] [place]  [address] [facility]  [phone]   [place]  [address]  [phone]   [place]  [address] [facility]  [phone] [place]  [address]  [phone]   [place]  [address]  [phone]   [place]  [address] [place] [address]  [phone] [place]  [address] [phone]         Patient to follow up with Primary Care provider regarding elevated blood pressure.      Body Mass Index (BMI)  Many things can cause foot and ankle problems. Foot structure, activity level, foot mechanics and injuries are common causes of pain.  One very important issue that often goes unmentioned, is body weight. Extra weight can cause increased stress on muscles, ligaments, bones and tendons.  Sometimes just a few extra pounds is all it takes to put one over her/his threshold. Without reducing that stress, it can be difficult to alleviate pain. Some people are uncomfortable addressing this issue, but we feel it is important for you to think about it. As Foot &  Ankle specialists, our job is addressing the lower extremity problem and possible causes. Regarding extra body weight, we encourage patients to discuss diet and weight management plans with their primary care doctors. It is this team approach that gives you the best opportunity for pain relief and getting you back on your feet.

## 2018-09-18 NOTE — PROGRESS NOTES
PATIENT HISTORY:  Mau Gómez is a 63 year old male who presents to clinic for R heel pain.  I was requested to see this patient for this issue by Natalie Nelson CNP.  3 month duration.  Pain with pressure.  No treatments.  Not improving on its own.  5/10 pain.      Review of Systems:  Patient denies fever, chills, rash, wound, stiffness, limping, numbness, weakness, heart burn, blood in stool, chest pain with activity, calf pain when walking, shortness of breath with activity, chronic cough, easy bleeding/bruising, swelling of ankles, excessive thirst, fatigue, depression, anxiety.       PAST MEDICAL HISTORY:   Past Medical History:   Diagnosis Date     A-fib (H) 2008    ablation therapy     Cavernous hemangioma 11/12    of liver     Chest pain 2002    ct neg for pe but ?liver mass     Chronic cough     on zmax qod via Dr Schwartz     CML (chronic myelocytic leukaemia) 11/13    Dr. Quinones then Dr. Aramis Reed in [place]     Colonic polyp 2014    [facility] gi, fu 3 years     Hemangioma of liver 2003    seen on ct chest then ct liver showed it     HTN (hypertension)      Hx of colonoscopy 2003    nl, fu 2014 2 polyps and to fu 3 years per [facility] gi     Hypercholesteremia      Intermittent asthma     had fu Dr. Omid Schwartz and using zithromax in winter and since fine     Kidney stone 7/12     Lung nodule 7/12    seen on ct for kidney stone, fu 1 year, fu done Nov 13 and to fu 1 year, fu done 3/15 and gone, no fu needed     LVH (left ventricular hypertrophy) 2002    echo done for sob     Normal coronary angiogram 2008    done for cp, less then 10% lad, otherwise nl     Shingles 1990's     Type II or unspecified type diabetes mellitus without mention of complication, not stated as uncontrolled         PAST SURGICAL HISTORY:   Past Surgical History:   Procedure Laterality Date     APPENDECTOMY       BONE MARROW ASPIRATION ONLY  11/14/2013    Procedure: BONE MARROW ASPIRATION ONLY;  BONE MARROW BIOPSY;  Surgeon: Wilner Salazar  MD Gerhard;  Location:  GI     TONSILLECTOMY       wisdom teeth removed          MEDICATIONS:   Current Outpatient Prescriptions:      ACE/ARB NOT PRESCRIBED, INTENTIONAL,, continuous prn. ACE & ARB not prescribed due to Other: BP controlled, Disp: , Rfl:      alfuzosin (UROXATRAL) 10 MG 24 hr tablet, Take 1 tablet (10 mg) by mouth daily, Disp: 90 tablet, Rfl: 3     ascorbic acid 500 MG TABS, , Disp: , Rfl:      ASPIRIN NOT PRESCRIBED, INTENTIONAL,, continuous prn. Antiplatelet medication not prescribed intentionally due to Allergy, Disp: 0 each, Rfl: 0     azithromycin (ZITHROMAX) 250 MG tablet, Take 250 mg by mouth every other day., Disp: , Rfl:      Cholecalciferol (VITAMIN D3 PO), Take 2,000 Units by mouth daily, Disp: , Rfl:      fexofenadine (ALLEGRA) 180 MG tablet, Take 180 mg by mouth daily., Disp: , Rfl:      Glucosamine-Chondroitin (GLUCOSAMINE CHONDR COMPLEX PO), Take 1,500 mg by mouth, Disp: , Rfl:      Magnesium 500 MG CAPS, Take 1 tablet by mouth daily, Disp: , Rfl:      Multiple Vitamin (DAILY MULTIVITAMIN PO), Take  by mouth daily., Disp: , Rfl:      pravastatin (PRAVACHOL) 40 MG tablet, Take 1 tablet (40 mg) by mouth daily, Disp: 90 tablet, Rfl: 3     Saw Palmetto 160 MG TABS, Take  by mouth daily., Disp: , Rfl:      UNABLE TO FIND, MEDICATION NAME: gleevac 600 mg daily, Disp: , Rfl:      ALLERGIES:    Allergies   Allergen Reactions     Alcohol      Aspirin Hives     Codeine Sulfate      Penicillins         SOCIAL HISTORY:   Social History     Social History     Marital status:      Spouse name: N/A     Number of children: 1     Years of education: N/A     Occupational History     commercial real estate [employer]     Social History Main Topics     Smoking status: Former Smoker     Quit date: 12/1/2007     Smokeless tobacco: Never Used      Comment: minimal history     Alcohol use No     Drug use: No     Sexual activity: Yes     Partners: Female     Other Topics Concern     Not on file     Social  History Narrative        FAMILY HISTORY:   Family History   Problem Relation Age of Onset     Cancer Father      melanoma     Cancer Mother      mantel cell ca, bladder--passed away in 8/2016     Medical History Unknown Maternal Grandfather         EXAM:Vitals: /84  Ht 6' (1.829 m)  Wt 239 lb (108.4 kg)  BMI 32.41 kg/m2  BMI= Body mass index is 32.41 kg/(m^2).    General appearance: Patient is alert and fully cooperative with history & exam.  No sign of distress is noted during the visit.     Psychiatric: Affect is pleasant & appropriate.  Patient appears motivated to improve health.     Respiratory: Breathing is regular & unlabored while sitting.     HEENT: Hearing is intact to spoken word.  Speech is clear.  No gross evidence of visual impairment that would impact ambulation.     Dermatologic: Skin is intact to R foot without significant lesions, rash or abrasion.  No paronychia or evidence of soft tissue infection is noted.     Vascular: DP & PT pulses are intact & regular on the R.  No significant edema or varicosities noted.  CFT and skin temperature are normal.     Neurologic: Lower extremity sensation is intact to light touch.  No evidence of weakness or contracture in the lower extremities.  No evidence of neuropathy.     Musculoskeletal: R plantar heel pain at fascial insertion.  No pain with side to side heel squeeze.  Patient is ambulatory without assistive device or brace.  No gross ankle deformity noted.  No foot or ankle joint effusion is noted.     ASSESSMENT: R heel pain, suggestive of plantar fasciitis     PLAN:  Reviewed patient's chart in epic.  Discussed condition and treatment options including pros and cons.    Suspect fasciitis given location.  Bursitis possible.    The potential causes and nature of plantar fasciitis were discussed with the patient.  We reviewed the natural history/prognosis of the condition and risks if left untreated.       We discussed possible causes of the  condition as it relates to the patients specific situation.      Conservative treatment options were reviewed:  appropriate shoes, avoidance of barefoot walking, inserts/orthoses, stretching, ice, massage, immobilization and NSAIDs.     We also reviewed the option of injection therapy.     After thorough discussion and answering all questions, the patient elected to try icing, stretching, superfeet inserts, better shoes.    F/u 1 month prn.          Nicolas Soto DPM, FACFAS    Weight management plan: Patient was referred to their PCP to discuss a diet and exercise plan.     Patient to follow up with Primary Care provider regarding elevated blood pressure.

## 2018-09-18 NOTE — LETTER
9/18/2018         RE: Mau Gómez  [address] [facility] [address]        Dear Colleague,    Thank you for referring your patient, Mau Gómez, to the [facility]. Please see a copy of my visit note below.    PATIENT HISTORY:  Mau Gómez is a 63 year old male who presents to clinic for R heel pain.  I was requested to see this patient for this issue by Natalie Nelson CNP.  3 month duration.  Pain with pressure.  No treatments.  Not improving on its own.  5/10 pain.      Review of Systems:  Patient denies fever, chills, rash, wound, stiffness, limping, numbness, weakness, heart burn, blood in stool, chest pain with activity, calf pain when walking, shortness of breath with activity, chronic cough, easy bleeding/bruising, swelling of ankles, excessive thirst, fatigue, depression, anxiety.       PAST MEDICAL HISTORY:   Past Medical History:   Diagnosis Date     A-fib (H) 2008    ablation therapy     Cavernous hemangioma 11/12    of liver     Chest pain 2002    ct neg for pe but ?liver mass     Chronic cough     on zmax qod via Dr Schwartz     CML (chronic myelocytic leukaemia) 11/13    Dr. Quinones then Dr. Aramis Reed in [place]     Colonic polyp 2014    [place] gi, fu 3 years     Hemangioma of liver 2003    seen on ct chest then ct liver showed it     HTN (hypertension)      Hx of colonoscopy 2003    nl, fu 2014 2 polyps and to fu 3 years per [place] gi     Hypercholesteremia      Intermittent asthma     had fu Dr. Omid Schwartz and using zithromax in winter and since fine     Kidney stone 7/12     Lung nodule 7/12    seen on ct for kidney stone, fu 1 year, fu done Nov 13 and to fu 1 year, fu done 3/15 and gone, no fu needed     LVH (left ventricular hypertrophy) 2002    echo done for sob     Normal coronary angiogram 2008    done for cp, less then 10% lad, otherwise nl     Juan 1990's     Type II or unspecified type diabetes mellitus without mention of complication, not stated as  uncontrolled         PAST SURGICAL HISTORY:   Past Surgical History:   Procedure Laterality Date     APPENDECTOMY       BONE MARROW ASPIRATION ONLY  11/14/2013    Procedure: BONE MARROW ASPIRATION ONLY;  BONE MARROW BIOPSY;  Surgeon: Wilner Salazar MD;  Location:  GI     TONSILLECTOMY       wisdom teeth removed          MEDICATIONS:   Current Outpatient Prescriptions:      ACE/ARB NOT PRESCRIBED, INTENTIONAL,, continuous prn. ACE & ARB not prescribed due to Other: BP controlled, Disp: , Rfl:      alfuzosin (UROXATRAL) 10 MG 24 hr tablet, Take 1 tablet (10 mg) by mouth daily, Disp: 90 tablet, Rfl: 3     ascorbic acid 500 MG TABS, , Disp: , Rfl:      ASPIRIN NOT PRESCRIBED, INTENTIONAL,, continuous prn. Antiplatelet medication not prescribed intentionally due to Allergy, Disp: 0 each, Rfl: 0     azithromycin (ZITHROMAX) 250 MG tablet, Take 250 mg by mouth every other day., Disp: , Rfl:      Cholecalciferol (VITAMIN D3 PO), Take 2,000 Units by mouth daily, Disp: , Rfl:      fexofenadine (ALLEGRA) 180 MG tablet, Take 180 mg by mouth daily., Disp: , Rfl:      Glucosamine-Chondroitin (GLUCOSAMINE CHONDR COMPLEX PO), Take 1,500 mg by mouth, Disp: , Rfl:      Magnesium 500 MG CAPS, Take 1 tablet by mouth daily, Disp: , Rfl:      Multiple Vitamin (DAILY MULTIVITAMIN PO), Take  by mouth daily., Disp: , Rfl:      pravastatin (PRAVACHOL) 40 MG tablet, Take 1 tablet (40 mg) by mouth daily, Disp: 90 tablet, Rfl: 3     Saw Palmetto 160 MG TABS, Take  by mouth daily., Disp: , Rfl:      UNABLE TO FIND, MEDICATION NAME: gleevac 600 mg daily, Disp: , Rfl:      ALLERGIES:    Allergies   Allergen Reactions     Alcohol      Aspirin Hives     Codeine Sulfate      Penicillins         SOCIAL HISTORY:   Social History     Social History     Marital status:      Spouse name: N/A     Number of children: 1     Years of education: N/A     Occupational History     commercial real estate [employer]     Social History Main Topics      Smoking status: Former Smoker     Quit date: 12/1/2007     Smokeless tobacco: Never Used      Comment: minimal history     Alcohol use No     Drug use: No     Sexual activity: Yes     Partners: Female     Other Topics Concern     Not on file     Social History Narrative        FAMILY HISTORY:   Family History   Problem Relation Age of Onset     Cancer Father      melanoma     Cancer Mother      mantel cell ca, bladder--passed away in 8/2016     Medical History Unknown Maternal Grandfather         EXAM:Vitals: /84  Ht 6' (1.829 m)  Wt 239 lb (108.4 kg)  BMI 32.41 kg/m2  BMI= Body mass index is 32.41 kg/(m^2).    General appearance: Patient is alert and fully cooperative with history & exam.  No sign of distress is noted during the visit.     Psychiatric: Affect is pleasant & appropriate.  Patient appears motivated to improve health.     Respiratory: Breathing is regular & unlabored while sitting.     HEENT: Hearing is intact to spoken word.  Speech is clear.  No gross evidence of visual impairment that would impact ambulation.     Dermatologic: Skin is intact to R foot without significant lesions, rash or abrasion.  No paronychia or evidence of soft tissue infection is noted.     Vascular: DP & PT pulses are intact & regular on the R.  No significant edema or varicosities noted.  CFT and skin temperature are normal.     Neurologic: Lower extremity sensation is intact to light touch.  No evidence of weakness or contracture in the lower extremities.  No evidence of neuropathy.     Musculoskeletal: R plantar heel pain at fascial insertion.  No pain with side to side heel squeeze.  Patient is ambulatory without assistive device or brace.  No gross ankle deformity noted.  No foot or ankle joint effusion is noted.     ASSESSMENT: R heel pain, suggestive of plantar fasciitis     PLAN:  Reviewed patient's chart in epic.  Discussed condition and treatment options including pros and cons.    Suspect fasciitis given  location.  Bursitis possible.    The potential causes and nature of plantar fasciitis were discussed with the patient.  We reviewed the natural history/prognosis of the condition and risks if left untreated.       We discussed possible causes of the condition as it relates to the patients specific situation.      Conservative treatment options were reviewed:  appropriate shoes, avoidance of barefoot walking, inserts/orthoses, stretching, ice, massage, immobilization and NSAIDs.     We also reviewed the option of injection therapy.     After thorough discussion and answering all questions, the patient elected to try icing, stretching, superfeet inserts, better shoes.    F/u 1 month prn.          Nicolas Soto DPM, FACFAS    Weight management plan: Patient was referred to their PCP to discuss a diet and exercise plan.     Patient to follow up with Primary Care provider regarding elevated blood pressure.        Again, thank you for allowing me to participate in the care of your patient.        Sincerely,        Nicolas Soto DPM

## 2018-09-18 NOTE — MR AVS SNAPSHOT
After Visit Summary   9/18/2018    Mau Gómez    MRN: [id number]           Patient Information     Date Of Birth          1955        Visit Information        Provider Department      9/18/2018 8:30 AM Nicolas Ramos DPM [facility]        Today's Diagnoses     Pain of right heel    -  1    Plantar fasciitis          Care Instructions    Thank you for choosing [facility]!    Follow up as needed     DR. RAMOS'S CLINIC LOCATIONS     MONDAY  [place] TUESDAY & FRIDAY AM  [place]   [address] [facility]   [address]   [place] [address] [place] [address]   [phone]  [phone]       WEDNESDAY  [place] SCHEDULE SURGERY: [phone]   [address] [facility] APPOINTMENTS: [phone]   [place] [address] BILLING QUESTIONS: [phone]   [phone]       PLANTAR FASCIITIS  Plantar fasciitis is often referred to as heel spurs or heel pain. Plantar fasciitis is a very common problem that affects people of all foot shapes, age, weight and activity level. Pain may be in the arch or on the weight-bearing surface of the heel. The pain may come on without injury or identifiable cause. Pain is generally present when first getting out of bed in the morning or up from a seated break.     CAUSES  The plantar fascia is a dense fibrous band of tissue that stretches across the bottom surface of the foot. The fascia helps support the foot muscles and arch. Plantar fasciitis is thought to be caused by mechanical strain or overload. Frequent walking without shoes or wearing unsupportive shoes is thought to cause structural overload and ultimately inflammation of the plantar fascia. Some people have heel spurs that can be seen on x-ray. The heel spur is actually a minor component of plantar fascitis and is largely ignored.       SELF TREATMENT   The easiest solution is to stop walking  around your home without shoes. Plantar fasciitis is largely a shoe problem. Shoes are either not being worn often enough or your current shoes are inadequate for your weight, foot structure or activity level. The majority of shoes on the market today are not sufficient to resist development of plantar fasciitis or to promote healing. Assume that your current shoes are inadequate and will need to be replaced. Even high quality shoes wear out with 6 months to one year of frequent use. Weight loss is another option. Losing ten pounds in the next two months may be enough to resolve the problem. Ice applied to the area of pain two to three times per day for ten minutes each session can be very helpful. Warm foot soaks in epsom salts can also relieve pain. This should continue until the problem resolves. Achilles tendon stretching is essential. Stretch multiple times daily to promote healing and to prevent recurrence in the future. Over all stretching of the body is helpful as well such as the calves, thighs and lower back. Normally when one area of the body is tight, other areas are too. Gentle Yoga can be good for this.     Over the counter topical anti inflammatories can be helpful such as biofreeze, bengay, salon pas, ect...  Oral ibuprofen or aleve is recommended as well to try to calm down inflammation.     Night splints can be helpful to gradually stretch the foot at night as a lot of pain is when you get up in the morning. Taking a towel or thera band and stretching the foot back multiple times before you get ou of bed can be beneficial as well.     MEDICAL TREATMENT  Medical treatments often include custom arch supports, cortisone injections, physical therapy, splints to be worn in bed, prescription medications and surgery. The home treatments listed above will be necessary regardless of these advanced medical treatments. Surgery is rarely needed but is very helpful in selected cases.     PROGNOSIS  Plantar  fasciitis can last from one day to a lifetime. Some people get intermittent fascitis that is very short-lived. Others suffer daily for years. Excessive body weight, frequent bare foot walking, long hours on the feet, inadequate shoes, predisposing foot structures and excessive activity such as running are all potential issues that lead to chronic and/or recurring plantar fascitis. Having plantar fasciitis means that you are forever prone to this problem and will require modification of some of the above factors. Most people seek treatment within one to four months. Healing usually requires a similar one to four month time frame. Healing time is relative to the amount of effort spent treating the problem.   Plantar fasciitis is highly recurrent. Risk factors often continue, including return to bare foot walking, inadequate shoes, excessive body weight, excessive activities, etc. Your life style and foot structure may predispose you to recurrent plantar fasciitis. A daily prevention regimen can be very helpful. Ongoing use of shoe inserts, careful attention to appropriate shoes, daily Achilles stretching, etc. may prevent recurrence. Prompt attention at the earliest warning signs of heel pain can resolve the problem in as short as a few days.     EXERCISES  Stair Exercise: Step on the stairs with the ball of your foot and hold your position for at least 15 seconds, then slowly step down with the heels of your foot. You can do this daily and as often as you want.   Picking the Towel: Sit comfortably and then pick the towel up with your toes. You can use any object other than a towel as long as the material can be soft and you can pick it up with your toes.  Rolling the Bottle: Use a small ball or frozen water bottle and then roll it around with your foot.   Flex the Toes: Sit comfortably and then flex your toes by pointing it towards the floor or towards your body. This will relax and flex your foot and exercise your  plantar fascia, the calf, and the Achilles tendon. The inability of the foot to stretch often causes the bunching up of the plantar fascia area leading to the pain.  Calf/Achilles Stretching: Lay on you back and raise one foot, then point your toes towards the floor. See photo below:               Hold each stretch for 10 seconds. Stretch 10 times per set, three sets per day. Morning, afternoon and evening. If your heel pain is very severe in the morning, consider doing the first set of stretches before you get out of bed.      OVER THE COUNTER INSERT RECOMMENDATIONS  SuperFeet   Sofsole Fit Spenco   Power Step   Walk-Fit Arch Cradles     Most of these can be found at your local [employer], [employer], or online.  **A good high quality over the counter insert should cost around $40-$50      [facility]  [address] [place] [address]  [phone]   [address]  [phone] [place]  [address] [facility]  [phone]   [place]  [address] [facility] [address]  [phone]   [place]  [address] [facility]  [phone] [place]  [address] [place] [address] [place]  [phone]   [place]  [address]  [phone]   [place]  [address] [place] [address]  [phone] [place]  [address] [phone]         Patient to follow up with Primary Care provider regarding elevated blood pressure.      Body Mass Index (BMI)  Many things can cause foot and ankle problems. Foot structure, activity level, foot mechanics and injuries are common causes of pain.  One very important issue that often goes unmentioned, is body weight. Extra weight can cause increased stress on muscles, ligaments, bones and tendons.  Sometimes just a few extra pounds is all it takes to put one over her/his threshold. Without reducing that stress, it can be difficult to alleviate pain. Some people are uncomfortable addressing this issue, but we feel it is important for you to think about it. As Foot &   Ankle specialists, our job is addressing the lower extremity problem and possible causes. Regarding extra body weight, we encourage patients to discuss diet and weight management plans with their primary care doctors. It is this team approach that gives you the best opportunity for pain relief and getting you back on your feet.              Follow-ups after your visit        Your next 10 appointments already scheduled     Sep 20, 2018  2:45 PM CDT   [facility] Endoscopy with Aneudy Bartholomew MD   [facility] ([facility]    [facility] ([facility])   [phone]            Sep 20, 2018   Procedure with Aneudy Bartholomew MD   [facility] Endoscopy ([facility])    [address]  [place] [address] [phone]           [facility] is located at [address]              Who to contact     If you have questions or need follow up information about today's clinic visit or your schedule please contact [facility] directly at [phone].  Normal or non-critical lab and imaging results will be communicated to you by MyChart, letter or phone within 4 business days after the clinic has received the results. If you do not hear from us within 7 days, please contact the clinic through MyChart or phone. If you have a critical or abnormal lab result, we will notify you by phone as soon as possible.  Submit refill requests through [employer] or call your pharmacy and they will forward the refill request to us. Please allow 3 business days for your refill to be completed.          Additional Information About Your Visit        Care EveryWhere ID     This is your Care EveryWhere ID. This could be used by other organizations to access your [place] medical records  [id number]        Your Vitals Were     Height BMI (Body Mass Index)                6' (1.829 m) 32.41 kg/m2            Blood Pressure from Last 3 Encounters:   09/18/18 128/84   09/10/18 110/72   09/06/18 (!) 154/99    Weight from Last 3 Encounters:   09/18/18 239 lb (108.4 kg)   09/10/18 239 lb (108.4 kg)   09/06/18 239 lb (108.4 kg)              Today, you had the following     No orders found for display       Primary Care Provider Office Phone # Fax #    Wu Jorge [phone] [address] [facility] [address]  [facility] [address]        Equal Access to Services     [facility]: Hadii jordan kwan hadmichelle [place], waaxda lurosario, qaybta kaalmada brandon, sixto carpenter . So [facility] [phone].    ATENCIÓN: Si habla español, tiene a bean disposición servicios gratuitos de asistencia lingüística. [facility] [phone].    We comply with applicable federal civil rights laws and [place] laws. We do not discriminate on the basis of race, color, national origin, age, disability, sex, sexual orientation, or gender identity.            Thank you!     Thank you for choosing [facility]  for your care. Our goal is always to provide you with excellent care. Hearing back from our patients is one way we can continue to improve our services. Please take a few minutes to complete the written survey that you may receive in the mail after your visit with us. Thank you!             Your Updated Medication List - Protect others around you: Learn how to safely use, store and throw away your medicines at www.disposemymeds.org.          This list is accurate as of 9/18/18  8:46 AM.  Always use your most recent med list.                   Brand Name Dispense Instructions for use Diagnosis    * ACE/ARB/ARNI NOT PRESCRIBED (INTENTIONAL)      continuous prn. ACE & ARB not prescribed due to Other: BP controlled        alfuzosin 10 MG 24 hr tablet    UROXATRAL    90 tablet    Take 1 tablet (10 mg) by mouth daily    Urinary frequency       ALLEGRA 180 MG tablet   Generic drug:  fexofenadine       Take 180 mg by mouth daily.        ascorbic acid 500 MG Tabs           * ASPIRIN NOT PRESCRIBED    INTENTIONAL    0 each    continuous prn. Antiplatelet medication not prescribed intentionally due to Allergy    Type 2 diabetes, HbA1c goal < 7% (H)       DAILY MULTIVITAMIN PO      Take  by mouth daily.    Intermittent asthma       GLUCOSAMINE CHONDR COMPLEX PO      Take 1,500 mg by mouth        Magnesium 500 MG Caps      Take 1 tablet by mouth daily        pravastatin 40 MG tablet    PRAVACHOL    90 tablet    Take 1 tablet (40 mg) by mouth daily    Hyperlipidemia LDL goal <100       Saw Palmetto 160 MG Tabs      Take  by mouth daily.    Intermittent asthma       UNABLE TO FIND      MEDICATION NAME: gleevac 600 mg daily        VITAMIN D3 PO      Take 2,000 Units by mouth daily        ZITHROMAX 250 MG tablet   Generic drug:  azithromycin      Take 250 mg by mouth every other day.        * Notice:  This list has 2 medication(s) that are the same as other medications prescribed for you. Read the directions carefully, and ask your doctor or other care provider to review them with you.

## 2018-09-20 ENCOUNTER — HOSPITAL ENCOUNTER (OUTPATIENT)
Facility: CLINIC | Age: 63
Discharge: HOME | End: 2018-09-20
Attending: SURGERY | Admitting: SURGERY
Payer: MEDICARE

## 2018-09-20 ENCOUNTER — SURGERY (OUTPATIENT)
Age: 63
End: 2018-09-20

## 2018-09-20 ENCOUNTER — OFFICE VISIT (OUTPATIENT)
Dept: SURGERY | Facility: PHYSICIAN GROUP | Age: 63
End: 2018-09-20
Payer: MEDICARE

## 2018-09-20 VITALS — SYSTOLIC BLOOD PRESSURE: 142 MMHG | DIASTOLIC BLOOD PRESSURE: 99 MMHG

## 2018-09-20 PROCEDURE — 21931 EXC BACK LES SC 3 CM/>: CPT | Performed by: SURGERY

## 2018-09-20 PROCEDURE — 11406 EXC TR-EXT B9+MARG >4.0 CM: CPT | Performed by: SURGERY

## 2018-09-20 PROCEDURE — 11400 EXC TR-EXT B9+MARG 0.5 CM<: CPT | Performed by: SURGERY

## 2018-09-20 PROCEDURE — 12032 INTMD RPR S/A/T/EXT 2.6-7.5: CPT

## 2018-09-20 RX ORDER — LIDOCAINE HYDROCHLORIDE 10 MG/ML
5 INJECTION, SOLUTION EPIDURAL; INFILTRATION; INTRACAUDAL; PERINEURAL ONCE
Status: DISCONTINUED | OUTPATIENT
Start: 2018-09-20 | End: 2018-09-20 | Stop reason: HOSPADM

## 2018-09-20 NOTE — OP NOTE
Preoperative diagnosis: right axillary mass  Postoperative diagnosis: right axillary lipoma  Operative procedure: excision of right axillary lipoma ([id number])  Surgeon:  Aneudy Bartholomew M.D.    Anesthesia:  Local  EBL: Minimal  Events:  The procedure was started with Mau Gómez in the supine position, his right axilla was prepped and draped in the usual sterile fashion.  A generous amount of local anesthetic was utilized in the area of concern.  Incision was made sharply; sharp and blunt dissection down to and around the lipoma which was fully excised.  The cavity was explored and no evidence of additional pathology noted.  Hemostasis was secured.  The wound was closed in layers with 3-0 Vicryl and 4-0 vicryl.  Dermabond was utilized for skin.  All counts correct.  No immediate complications.

## 2018-10-19 ENCOUNTER — TRANSFERRED RECORDS (OUTPATIENT)
Dept: HEALTH INFORMATION MANAGEMENT | Facility: CLINIC | Age: 63
End: 2018-10-19

## 2019-01-04 DIAGNOSIS — R35.0 URINARY FREQUENCY: ICD-10-CM

## 2019-01-04 DIAGNOSIS — E78.5 HYPERLIPIDEMIA LDL GOAL <100: ICD-10-CM

## 2019-01-04 NOTE — TELEPHONE ENCOUNTER
"Requested Prescriptions   Pending Prescriptions Disp Refills     pravastatin (PRAVACHOL) 40 MG tablet [Pharmacy Med Name: PRAVASTATIN  TAB 40MG] 90 tablet 3     Sig: TAKE 1 TABLET DAILY    Statins Protocol Failed - 1/4/2019 10:07 AM       Failed - LDL on file in past 12 months    Recent Labs   Lab Test 10/05/17  1001   LDL 60            Passed - No abnormal creatine kinase in past 12 months    No lab results found.            Passed - Recent (12 mo) or future (30 days) visit within the authorizing provider's specialty    Patient had office visit in the last 12 months or has a visit in the next 30 days with authorizing provider or within the authorizing provider's specialty.  See \"Patient Info\" tab in inbasket, or \"Choose Columns\" in Meds & Orders section of the refill encounter.             Passed - Patient is age 18 or older        alfuzosin ER (UROXATRAL) 10 MG 24 hr tablet [Pharmacy Med Name: ALFUZOSIN ER TAB 10MG] 90 tablet 3     Sig: TAKE 1 TABLET DAILY    Alpha Blockers Failed - 1/4/2019 10:07 AM       Failed - Blood pressure under 140/90 in past 12 months    BP Readings from Last 3 Encounters:   09/20/18 (!) 142/99   09/18/18 128/84   09/10/18 110/72                Passed - Recent (12 mo) or future (30 days) visit within the authorizing provider's specialty    Patient had office visit in the last 12 months or has a visit in the next 30 days with authorizing provider or within the authorizing provider's specialty.  See \"Patient Info\" tab in inbasket, or \"Choose Columns\" in Meds & Orders section of the refill encounter.             Passed - Patient does not have Tadalafil, Vardenafil, or Sildenafil on their medication list       Passed - Patient is 18 years of age or older        "

## 2019-01-06 ENCOUNTER — TRANSFERRED RECORDS (OUTPATIENT)
Dept: HEALTH INFORMATION MANAGEMENT | Facility: CLINIC | Age: 64
End: 2019-01-06

## 2019-01-06 LAB
CREAT SERPL-MCNC: 1.49 MG/DL (ref 0.72–1.25)
GFR SERPL CREATININE-BSD FRML MDRD: 47 ML/MIN/1.73M2
GLUCOSE SERPL-MCNC: 119 MG/DL (ref 65–100)
POTASSIUM SERPL-SCNC: 4.6 MMOL/L (ref 3.5–5)

## 2019-01-07 NOTE — TELEPHONE ENCOUNTER
Last OV with PCP 10-5-17.  Routing to TCs to contact patient to inform due for appointment to see PCP.  Please route back to refill pool once scheduled.     Winnie LERMA RN,BSN

## 2019-01-08 RX ORDER — PRAVASTATIN SODIUM 40 MG
40 TABLET ORAL DAILY
Qty: 30 TABLET | Refills: 0 | Status: SHIPPED | OUTPATIENT
Start: 2019-01-08 | End: 2019-04-04

## 2019-01-08 RX ORDER — ALFUZOSIN HYDROCHLORIDE 10 MG/1
1 TABLET, EXTENDED RELEASE ORAL DAILY
Qty: 30 TABLET | Refills: 0 | Status: SHIPPED | OUTPATIENT
Start: 2019-01-08 | End: 2019-01-14

## 2019-01-08 NOTE — TELEPHONE ENCOUNTER
Left message asking patient to call back to notify due for OV   Pravastatin - Medication is being filled for 1 time refill only due to:  Patient needs to be seen because it has been more than one year since last visit.     Alfuzosin - Routing refill request to provider for review/approval because:  Last BP high       Aditi BELLO RN

## 2019-01-14 ENCOUNTER — OFFICE VISIT (OUTPATIENT)
Dept: FAMILY MEDICINE | Facility: CLINIC | Age: 64
End: 2019-01-14
Payer: MEDICARE

## 2019-01-14 VITALS
HEART RATE: 89 BPM | DIASTOLIC BLOOD PRESSURE: 82 MMHG | SYSTOLIC BLOOD PRESSURE: 127 MMHG | BODY MASS INDEX: 32.78 KG/M2 | HEIGHT: 72 IN | OXYGEN SATURATION: 97 % | TEMPERATURE: 97.4 F | WEIGHT: 242 LBS

## 2019-01-14 DIAGNOSIS — R07.89 ATYPICAL CHEST PAIN: Primary | ICD-10-CM

## 2019-01-14 DIAGNOSIS — N18.30 CKD (CHRONIC KIDNEY DISEASE) STAGE 3, GFR 30-59 ML/MIN (H): ICD-10-CM

## 2019-01-14 DIAGNOSIS — R35.0 URINARY FREQUENCY: ICD-10-CM

## 2019-01-14 DIAGNOSIS — C92.10 CHRONIC MYELOID LEUKEMIA (H): ICD-10-CM

## 2019-01-14 DIAGNOSIS — E11.9 TYPE 2 DIABETES MELLITUS WITHOUT COMPLICATION, WITHOUT LONG-TERM CURRENT USE OF INSULIN (H): ICD-10-CM

## 2019-01-14 LAB — TROPONIN I SERPL-MCNC: <0.015 UG/L (ref 0–0.04)

## 2019-01-14 PROCEDURE — 84484 ASSAY OF TROPONIN QUANT: CPT | Performed by: INTERNAL MEDICINE

## 2019-01-14 PROCEDURE — 36415 COLL VENOUS BLD VENIPUNCTURE: CPT | Performed by: INTERNAL MEDICINE

## 2019-01-14 PROCEDURE — 93000 ELECTROCARDIOGRAM COMPLETE: CPT | Performed by: INTERNAL MEDICINE

## 2019-01-14 PROCEDURE — 99214 OFFICE O/P EST MOD 30 MIN: CPT | Performed by: INTERNAL MEDICINE

## 2019-01-14 RX ORDER — ALFUZOSIN HYDROCHLORIDE 10 MG/1
1 TABLET, EXTENDED RELEASE ORAL DAILY
Qty: 90 TABLET | Refills: 0 | Status: SHIPPED | OUTPATIENT
Start: 2019-01-14 | End: 2019-05-13

## 2019-01-14 RX ORDER — IMATINIB MESYLATE 400 MG/1
600 TABLET, FILM COATED ORAL DAILY
COMMUNITY
End: 2021-12-06

## 2019-01-14 ASSESSMENT — MIFFLIN-ST. JEOR: SCORE: 1920.7

## 2019-01-14 NOTE — PATIENT INSTRUCTIONS
Use heat on the area 4x a day for 15 minutes    Call if it worsens, you get shortness of breath or it's not going away in the next week    Wu Jorge M.D.

## 2019-01-14 NOTE — PROGRESS NOTES
SUBJECTIVE:   Mau Gómez is a 63 year old male who presents to clinic today for the following health issues:      ED/UC Followup:    Facility:  Counts include 234 beds at the [facility]    [address] [place] [address]      Date of visit: 01/06/2019  Reason for visit:   Chest pain at rest - Primary     Chest pain, unspecified     Renal insufficiency     Unspecified disorder of kidney and ureter       Current Status: Discharged to home;       The patient presents for left-sided chest pain.  It began around December 31 and has been present ever since.  He went to the [facility] ER on January 6 and I reviewed those notes.  Troponin, chest x-ray and EKG were all normal.  The discomfort persists.  The only thing that affects it is if he pushes on the area it makes it better.  Nothing else makes it better or worse.  No GI symptoms.  No coughs or fevers or shortness of breath.  No PND or edema.  No history of blood clots.  He has not had this before.  There was no precipitating event.    He does have multiple medical problems as noted.  He plans to come back for a physical.    Past Medical History:   Diagnosis Date     A-fib (H) 2008    ablation therapy     Cavernous hemangioma 11/12    of liver     Chest pain 2002    ct neg for pe but ?liver mass     Chronic cough     on zmax qod via Dr Schwartz     CML (chronic myelocytic leukaemia) 11/13    Dr. Quinones then Dr. Aramis Reed in [place]     Colonic polyp 2014    [place] gi, fu 3 years     Hemangioma of liver 2003    seen on ct chest then ct liver showed it     HTN (hypertension)      Hx of colonoscopy 2003    nl, fu 2014 2 polyps and to fu 3 years per [facility]     Hypercholesteremia      Intermittent asthma     had fu Dr. Omid Schwartz and using zithromax in winter and since fine     Kidney stone 7/12     Lung nodule 7/12    seen on ct for kidney stone, fu 1 year, fu done Nov 13 and to fu 1 year, fu done 3/15 and gone, no fu needed     LVH (left ventricular hypertrophy) 2002     echo done for sob     Normal coronary angiogram     done for cp, less then 10% lad, otherwise nl     Shingles      Type II or unspecified type diabetes mellitus without mention of complication, not stated as uncontrolled      Past Surgical History:   Procedure Laterality Date     APPENDECTOMY       BONE MARROW ASPIRATION ONLY  2013    Procedure: BONE MARROW ASPIRATION ONLY;  BONE MARROW BIOPSY;  Surgeon: Wilner Salazar MD;  Location:  GI     COLONOSCOPY       TONSILLECTOMY       wisdom teeth removed       Social History     Socioeconomic History     Marital status:      Spouse name: Not on file     Number of children: 1     Years of education: Not on file     Highest education level: Not on file   Social Needs     Financial resource strain: Not on file     Food insecurity - worry: Not on file     Food insecurity - inability: Not on file     Transportation needs - medical: Not on file     Transportation needs - non-medical: Not on file   Occupational History     Occupation: commercial real estate     Employer: [employer]   Tobacco Use     Smoking status: Former Smoker     Last attempt to quit: 2007     Years since quittin.1     Smokeless tobacco: Never Used     Tobacco comment: minimal history   Substance and Sexual Activity     Alcohol use: No     Alcohol/week: 0.0 oz     Drug use: No     Sexual activity: Yes     Partners: Female   Other Topics Concern     Parent/sibling w/ CABG, MI or angioplasty before 65F 55M? Not Asked   Social History Narrative     Not on file     Current Outpatient Medications   Medication Sig Dispense Refill     alfuzosin ER (UROXATRAL) 10 MG 24 hr tablet Take 1 tablet (10 mg) by mouth daily 90 tablet 0     imatinib (GLEEVEC) 400 MG tablet Take 600 mg by mouth daily       ACE/ARB NOT PRESCRIBED, INTENTIONAL, continuous prn. ACE & ARB not prescribed due to Other: BP controlled       ascorbic acid 500 MG TABS        ASPIRIN NOT PRESCRIBED, INTENTIONAL,  continuous prn. Antiplatelet medication not prescribed intentionally due to Allergy 0 each 0     azithromycin (ZITHROMAX) 250 MG tablet Take 250 mg by mouth every other day.       Cholecalciferol (VITAMIN D3 PO) Take 2,000 Units by mouth daily       fexofenadine (ALLEGRA) 180 MG tablet Take 180 mg by mouth daily.       Glucosamine-Chondroitin (GLUCOSAMINE CHONDR COMPLEX PO) Take 1,500 mg by mouth       Magnesium 500 MG CAPS Take 1 tablet by mouth daily       Multiple Vitamin (DAILY MULTIVITAMIN PO) Take  by mouth daily.       pravastatin (PRAVACHOL) 40 MG tablet Take 1 tablet (40 mg) by mouth daily 30 tablet 0     Saw Palmetto 160 MG TABS Take  by mouth daily.       UNABLE TO FIND MEDICATION NAME: gleevac 600 mg daily       Allergies   Allergen Reactions     Alcohol      Aspirin Hives     Codeine Sulfate      Penicillins      FAMILY HISTORY NOTED AND REVIEWED    REVIEW OF SYSTEMS: above    PHYSICAL EXAM    /82 (BP Location: Left arm, Patient Position: Sitting, Cuff Size: Adult Large)   Pulse 89   Temp 97.4  F (36.3  C) (Tympanic)   Ht 1.829 m (6')   Wt 109.8 kg (242 lb)   SpO2 97%   BMI 32.82 kg/m      Patient appears non toxic  Mouth and eyes within normal limits  Neck within normal limits  No supraclavicular, cervical or axillary lymphadenopathy  Lungs clear, normal flow  cv reglar rate and rhythm, no murmer, rub or gallop, nojvp or edema, carotids within normal limits no bruits  Abdomen normal active bowel sounds, soft, non-tender, no mgr, no hepatosplenomegaly  No chest wall masses or tendereness, breast exam within normal limits.    Troponin sent; ekg - nsr, within normal limits.    ASSESSMENT:  1. Chest pain, suspect msk, doubt cv given length of time and normal ekg but will check troponin.  I doubt pulmonary embolis and given ckd would prefer to avoid iv dye, doubt tumor or fx  2. ckd  3. Diabetes  4. cll    PLAN:  Check troponin  Heat qid  Call if worsens, shortness of breath, fever, not gone  soon  Follow up complete physical exam    Wu Jorge M.D.

## 2019-03-04 DIAGNOSIS — R35.0 URINARY FREQUENCY: ICD-10-CM

## 2019-03-05 RX ORDER — ALFUZOSIN HYDROCHLORIDE 10 MG/1
TABLET, EXTENDED RELEASE ORAL
Refills: 0
Start: 2019-03-05

## 2019-03-05 NOTE — TELEPHONE ENCOUNTER
"Last Written Prescription Date:  1/14/19  Last Fill Quantity: 90 tablet,  # refills: 0   Last office visit: 1/14/2019 with prescribing provider:  Ania   Future Office Visit:      Requested Prescriptions   Pending Prescriptions Disp Refills     alfuzosin ER (UROXATRAL) 10 MG 24 hr tablet [Pharmacy Med Name: ALFUZOSIN ER 10MG TABLETS] 90 tablet 0     Sig: TAKE 1 TABLET(10 MG) BY MOUTH DAILY    Alpha Blockers Passed - 3/4/2019  8:40 AM       Passed - Blood pressure under 140/90 in past 12 months    BP Readings from Last 3 Encounters:   01/14/19 127/82   09/20/18 (!) 142/99   09/18/18 128/84                Passed - Recent (12 mo) or future (30 days) visit within the authorizing provider's specialty    Patient had office visit in the last 12 months or has a visit in the next 30 days with authorizing provider or within the authorizing provider's specialty.  See \"Patient Info\" tab in inbasket, or \"Choose Columns\" in Meds & Orders section of the refill encounter.             Passed - Patient does not have Tadalafil, Vardenafil, or Sildenafil on their medication list       Passed - Medication is active on med list       Passed - Patient is 18 years of age or older          "

## 2019-03-15 ENCOUNTER — TRANSFERRED RECORDS (OUTPATIENT)
Dept: HEALTH INFORMATION MANAGEMENT | Facility: CLINIC | Age: 64
End: 2019-03-15

## 2019-04-01 DIAGNOSIS — E78.5 HYPERLIPIDEMIA LDL GOAL <100: ICD-10-CM

## 2019-04-02 NOTE — TELEPHONE ENCOUNTER
"Last Written Prescription Date:  1/08/19  Last Fill Quantity: 30 tablet,  # refills: 0   Last office visit: 1/14/2019 with prescribing provider:  Ania   Future Office Visit:      Requested Prescriptions   Pending Prescriptions Disp Refills     pravastatin (PRAVACHOL) 40 MG tablet 30 tablet 0     Sig: Take 1 tablet (40 mg) by mouth daily    Statins Protocol Failed - 4/1/2019  6:05 PM       Failed - LDL on file in past 12 months    Recent Labs   Lab Test 10/05/17  1001   LDL 60            Passed - No abnormal creatine kinase in past 12 months    No lab results found.            Passed - Recent (12 mo) or future (30 days) visit within the authorizing provider's specialty    Patient had office visit in the last 12 months or has a visit in the next 30 days with authorizing provider or within the authorizing provider's specialty.  See \"Patient Info\" tab in inbasket, or \"Choose Columns\" in Meds & Orders section of the refill encounter.             Passed - Medication is active on med list       Passed - Patient is age 18 or older          "

## 2019-04-03 ENCOUNTER — TRANSFERRED RECORDS (OUTPATIENT)
Dept: HEALTH INFORMATION MANAGEMENT | Facility: CLINIC | Age: 64
End: 2019-04-03

## 2019-04-03 LAB
ALT SERPL-CCNC: 34 IU/L (ref 0–44)
AST SERPL-CCNC: 34 IU/L (ref 0–40)
CREAT SERPL-MCNC: 1.42 MG/DL (ref 0.76–1.27)
GFR SERPL CREATININE-BSD FRML MDRD: 51 ML/MIN/1.73
GLUCOSE SERPL-MCNC: 129 MG/DL (ref 65–99)
POTASSIUM SERPL-SCNC: 4.6 MMOL/L (ref 3.5–5.2)

## 2019-04-04 RX ORDER — PRAVASTATIN SODIUM 40 MG
40 TABLET ORAL DAILY
Qty: 30 TABLET | Refills: 0 | Status: SHIPPED | OUTPATIENT
Start: 2019-04-04 | End: 2019-04-30

## 2019-04-04 NOTE — TELEPHONE ENCOUNTER
Routing refill request to provider for review/approval because:  Labs not current:  Lipids   Per last OV pt to return this month for annual exam - 30 day Rx pended with note to schedule fasting exam  Aditi BELLO RN

## 2019-04-30 DIAGNOSIS — E78.5 HYPERLIPIDEMIA LDL GOAL <100: ICD-10-CM

## 2019-05-01 NOTE — TELEPHONE ENCOUNTER
"pravastatin (PRAVACHOL) 40 MG tablet  Last Written Prescription Date:  4/4/19  Last Fill Quantity: 30,  # refills: 0   Last office visit: 1/14/2019 with prescribing provider:  myra    Future Office Visit:      Requested Prescriptions   Pending Prescriptions Disp Refills     pravastatin (PRAVACHOL) 40 MG tablet [Pharmacy Med Name: PRAVASTATIN 40MG TABLETS] 90 tablet 0     Sig: TAKE 1 TABLET BY MOUTH EVERY DAY       Statins Protocol Failed - 4/30/2019  8:31 PM        Failed - LDL on file in past 12 months     Recent Labs   Lab Test 10/05/17  1001   LDL 60             Passed - No abnormal creatine kinase in past 12 months     No lab results found.             Passed - Recent (12 mo) or future (30 days) visit within the authorizing provider's specialty     Patient had office visit in the last 12 months or has a visit in the next 30 days with authorizing provider or within the authorizing provider's specialty.  See \"Patient Info\" tab in inbasket, or \"Choose Columns\" in Meds & Orders section of the refill encounter.              Passed - Medication is active on med list        Passed - Patient is age 18 or older          "

## 2019-05-02 RX ORDER — PRAVASTATIN SODIUM 40 MG
TABLET ORAL
Qty: 90 TABLET | Refills: 2 | Status: SHIPPED | OUTPATIENT
Start: 2019-05-02 | End: 2019-05-30

## 2019-05-13 DIAGNOSIS — R35.0 URINARY FREQUENCY: ICD-10-CM

## 2019-05-13 NOTE — TELEPHONE ENCOUNTER
"Pending Prescriptions:                       Disp   Refills    alfuzosin ER (UROXATRAL) 10 MG 24 hr tabl*90 tab*0            Sig: TAKE 1 TABLET(10 MG) BY MOUTH DAILY    Last Written Prescription Date:  1/14/19  Last Fill Quantity: 90,  # refills: 0   Last office visit: 1/14/2019 with prescribing provider:     Future Office Visit:    Requested Prescriptions   Pending Prescriptions Disp Refills     alfuzosin ER (UROXATRAL) 10 MG 24 hr tablet [Pharmacy Med Name: ALFUZOSIN ER 10MG TABLETS] 90 tablet 0     Sig: TAKE 1 TABLET(10 MG) BY MOUTH DAILY       Alpha Blockers Passed - 5/13/2019  8:23 AM        Passed - Blood pressure under 140/90 in past 12 months     BP Readings from Last 3 Encounters:   01/14/19 127/82   09/20/18 (!) 142/99   09/18/18 128/84                 Passed - Recent (12 mo) or future (30 days) visit within the authorizing provider's specialty     Patient had office visit in the last 12 months or has a visit in the next 30 days with authorizing provider or within the authorizing provider's specialty.  See \"Patient Info\" tab in inbasket, or \"Choose Columns\" in Meds & Orders section of the refill encounter.              Passed - Patient does not have Tadalafil, Vardenafil, or Sildenafil on their medication list        Passed - Medication is active on med list        Passed - Patient is 18 years of age or older          "

## 2019-05-15 RX ORDER — ALFUZOSIN HYDROCHLORIDE 10 MG/1
TABLET, EXTENDED RELEASE ORAL
Qty: 90 TABLET | Refills: 0 | Status: SHIPPED | OUTPATIENT
Start: 2019-05-15 | End: 2019-08-10

## 2019-05-15 NOTE — TELEPHONE ENCOUNTER
Prescription approved per [facility] Refill Protocol.  Aditi BELLO RN

## 2019-05-29 ENCOUNTER — OFFICE VISIT (OUTPATIENT)
Dept: FAMILY MEDICINE | Facility: CLINIC | Age: 64
End: 2019-05-29
Payer: MEDICARE

## 2019-05-29 VITALS
DIASTOLIC BLOOD PRESSURE: 80 MMHG | HEIGHT: 72 IN | BODY MASS INDEX: 32.23 KG/M2 | SYSTOLIC BLOOD PRESSURE: 130 MMHG | HEART RATE: 99 BPM | WEIGHT: 238 LBS | OXYGEN SATURATION: 97 % | TEMPERATURE: 98.7 F

## 2019-05-29 DIAGNOSIS — C92.10 CHRONIC MYELOID LEUKEMIA (H): ICD-10-CM

## 2019-05-29 DIAGNOSIS — R06.2 WHEEZING: ICD-10-CM

## 2019-05-29 DIAGNOSIS — R05.9 COUGH: Primary | ICD-10-CM

## 2019-05-29 PROCEDURE — 99214 OFFICE O/P EST MOD 30 MIN: CPT | Performed by: NURSE PRACTITIONER

## 2019-05-29 RX ORDER — PREDNISONE 20 MG/1
40 TABLET ORAL DAILY
Qty: 10 TABLET | Refills: 0 | Status: SHIPPED | OUTPATIENT
Start: 2019-05-29 | End: 2019-06-03

## 2019-05-29 RX ORDER — FLUTICASONE PROPIONATE 50 MCG
1 SPRAY, SUSPENSION (ML) NASAL DAILY
COMMUNITY
End: 2024-06-25

## 2019-05-29 RX ORDER — CEFDINIR 300 MG/1
300 CAPSULE ORAL 2 TIMES DAILY
COMMUNITY
End: 2021-07-15

## 2019-05-29 RX ORDER — ALBUTEROL SULFATE 0.83 MG/ML
2.5 SOLUTION RESPIRATORY (INHALATION) EVERY 6 HOURS PRN
COMMUNITY
End: 2019-05-29

## 2019-05-29 RX ORDER — ALBUTEROL SULFATE 0.83 MG/ML
2.5 SOLUTION RESPIRATORY (INHALATION) EVERY 4 HOURS PRN
Qty: 180 ML | Refills: 0 | Status: SHIPPED | OUTPATIENT
Start: 2019-05-29 | End: 2021-06-24

## 2019-05-29 ASSESSMENT — MIFFLIN-ST. JEOR: SCORE: 1897.56

## 2019-05-29 NOTE — PATIENT INSTRUCTIONS
Mucinex DM (thins secretion and calms the cough). Mucinex just thins secretions     Careful with sudafed. This causes the urinary problems     Use the Flonase.    Continue the antibiotic and albuterol nebs. Every 4 hours as needed.

## 2019-05-29 NOTE — PROGRESS NOTES
Subjective     Mau Gómez is a 64 year old male who presents to clinic today for the following health issues:    HPI     Chief Complaint   Patient presents with     URI     started 7 days ago - cough, congestion, no fever.      Just over a week ago started with URI symptoms. Nonstop runny nose initially    If he lays down on back he is ok then develops a rattle   Laying on the side causes sinus to plug up   Coughs hard when he coughs.   By last Friday called immunologist and was put on cefdinir and advair   Also taking Sudafed, mucinex, afrin.  Using a neb   On prophylactic azithromycin currently   Takes allegra daily   Symptoms have not improved and he states he does not have time to be sick so he needs something to take     Past Medical History:   Diagnosis Date     A-fib (H) 2008    ablation therapy     Cavernous hemangioma 11/12    of liver     Chest pain 2002    ct neg for pe but ?liver mass     Chronic cough     on zmax qod via Dr Schwartz     CKD (chronic kidney disease) stage 3, GFR 30-59 ml/min (H)      CML (chronic myelocytic leukaemia) 11/13    Dr. Quinones then Dr. Aramis Reed in [place]     Colonic polyp 2014    [place] gi, fu 3 years     Hemangioma of liver 2003    seen on ct chest then ct liver showed it     HTN (hypertension)      Hx of colonoscopy 2003    nl, fu 2014 2 polyps and to fu 3 years per [facility] gi     Hypercholesteremia      Intermittent asthma     had fu Dr. Omid Schwartz and using zithromax in winter and since fine     Kidney stone 7/12     Lung nodule 7/12    seen on ct for kidney stone, fu 1 year, fu done Nov 13 and to fu 1 year, fu done 3/15 and gone, no fu needed     LVH (left ventricular hypertrophy) 2002    echo done for sob     Normal coronary angiogram 2008    done for cp, less then 10% lad, otherwise nl     Juan 1990's     Type II or unspecified type diabetes mellitus without mention of complication, not stated as uncontrolled      Family History   Problem Relation Age of Onset      Cancer Father         melanoma     Cancer Mother         mantel cell ca, bladder--passed away in 2016     Medical History Unknown Maternal Grandfather      Past Surgical History:   Procedure Laterality Date     APPENDECTOMY       BONE MARROW ASPIRATION ONLY  2013    Procedure: BONE MARROW ASPIRATION ONLY;  BONE MARROW BIOPSY;  Surgeon: Wilner Salazar MD;  Location:  GI     COLONOSCOPY       TONSILLECTOMY       wisdom teeth removed       Social History     Tobacco Use     Smoking status: Former Smoker     Last attempt to quit: 2007     Years since quittin.5     Smokeless tobacco: Never Used     Tobacco comment: minimal history   Substance Use Topics     Alcohol use: No     Alcohol/week: 0.0 oz     Current Outpatient Medications   Medication Sig Dispense Refill     ACE/ARB NOT PRESCRIBED, INTENTIONAL, continuous prn. ACE & ARB not prescribed due to Other: BP controlled       albuterol (PROVENTIL) (2.5 MG/3ML) 0.083% neb solution Take 1 vial (2.5 mg) by nebulization every 4 hours as needed for shortness of breath / dyspnea or wheezing 180 mL 0     alfuzosin ER (UROXATRAL) 10 MG 24 hr tablet TAKE 1 TABLET(10 MG) BY MOUTH DAILY 90 tablet 0     ascorbic acid 500 MG TABS        ASPIRIN NOT PRESCRIBED, INTENTIONAL, continuous prn. Antiplatelet medication not prescribed intentionally due to Allergy 0 each 0     cefdinir (OMNICEF) 300 MG capsule Take 300 mg by mouth 2 times daily       Cholecalciferol (VITAMIN D3 PO) Take 2,000 Units by mouth daily       fluticasone (FLONASE) 50 MCG/ACT nasal spray Spray 1 spray into both nostrils daily       fluticasone-salmeterol (ADVAIR) 250-50 MCG/DOSE inhaler Inhale 1 puff into the lungs every 12 hours       Glucosamine-Chondroitin (GLUCOSAMINE CHONDR COMPLEX PO) Take 1,500 mg by mouth       imatinib (GLEEVEC) 400 MG tablet Take 600 mg by mouth daily       Magnesium 500 MG CAPS Take 1 tablet by mouth daily       Multiple Vitamin (DAILY MULTIVITAMIN PO) Take   by mouth daily.       pravastatin (PRAVACHOL) 40 MG tablet TAKE 1 TABLET BY MOUTH EVERY DAY 90 tablet 2     predniSONE (DELTASONE) 20 MG tablet Take 2 tablets (40 mg) by mouth daily for 5 days 10 tablet 0     Saw Palmetto 160 MG TABS Take  by mouth daily.       UNABLE TO FIND MEDICATION NAME: gleevac 600 mg daily       azithromycin (ZITHROMAX) 250 MG tablet Take 250 mg by mouth every other day.       fexofenadine (ALLEGRA) 180 MG tablet Take 180 mg by mouth daily.       Allergies   Allergen Reactions     Alcohol      Aspirin Hives     Codeine Sulfate      Penicillins        Reviewed and updated as needed this visit by clinical staff and provider     Review of Systems   Detailed as above         Objective    /80 (BP Location: Right arm, Patient Position: Sitting, Cuff Size: Adult Regular)   Pulse 99   Temp 98.7  F (37.1  C) (Tympanic)   Ht 1.829 m (6')   Wt 108 kg (238 lb)   SpO2 97%   BMI 32.28 kg/m    Body mass index is 32.28 kg/m .  Physical Exam   Constitutional: He appears well-developed.   HENT:   Head: Normocephalic.   Right Ear: Tympanic membrane, external ear and ear canal normal.   Left Ear: Tympanic membrane, external ear and ear canal normal.   Mouth/Throat: Oropharynx is clear and moist. No oropharyngeal exudate.   Eyes: Conjunctivae are normal.   Neck: Normal range of motion.   Cardiovascular: Normal rate, regular rhythm and normal heart sounds.   Pulmonary/Chest: Effort normal. No respiratory distress. He has wheezes.   Lymphadenopathy:     He has no cervical adenopathy.   Neurological: He is alert.   Skin: Skin is warm and dry.   Psychiatric: He has a normal mood and affect. Judgment normal.        Assessment and Plan:       ICD-10-CM    1. Cough R05 predniSONE (DELTASONE) 20 MG tablet     albuterol (PROVENTIL) (2.5 MG/3ML) 0.083% neb solution   2. Wheezing R06.2 predniSONE (DELTASONE) 20 MG tablet     albuterol (PROVENTIL) (2.5 MG/3ML) 0.083% neb solution   3. Chronic myeloid leukemia (H)  C92.10      Suspect this is viral as he has not had any improvement with abx and also his prophylactic abx d/t his CML. Tried to encourage watchful waiting but pt was quite upset he was still sick. Sent with prednisone and also refills of nebs. Follow-up with PCP with persistent symptoms     Patient Instructions   Mucinex DM (thins secretion and calms the cough). Mucinex just thins secretions     Careful with sudafed. This causes the urinary problems     Use the Flonase.    Continue the antibiotic and albuterol nebs. Every 4 hours as needed.       Natalie Nelson, APRN, CNP  [facility]

## 2019-05-30 DIAGNOSIS — E78.5 HYPERLIPIDEMIA LDL GOAL <100: ICD-10-CM

## 2019-05-30 NOTE — TELEPHONE ENCOUNTER
"Last Written Prescription Date:  5/02/19  Last Fill Quantity: 90 tablet,  # refills: 2   Last office visit: 1/14/2019 with prescribing provider:  Ania   Future Office Visit:      Requested Prescriptions   Pending Prescriptions Disp Refills     pravastatin (PRAVACHOL) 40 MG tablet 90 tablet 2     Sig: Take 1 tablet (40 mg) by mouth daily       Statins Protocol Failed - 5/30/2019  2:22 PM        Failed - LDL on file in past 12 months     Recent Labs   Lab Test 10/05/17  1001   LDL 60             Passed - No abnormal creatine kinase in past 12 months     No lab results found.             Passed - Recent (12 mo) or future (30 days) visit within the authorizing provider's specialty     Patient had office visit in the last 12 months or has a visit in the next 30 days with authorizing provider or within the authorizing provider's specialty.  See \"Patient Info\" tab in inbasket, or \"Choose Columns\" in Meds & Orders section of the refill encounter.              Passed - Medication is active on med list        Passed - Patient is age 18 or older          "

## 2019-06-03 ENCOUNTER — TELEPHONE (OUTPATIENT)
Dept: FAMILY MEDICINE | Facility: CLINIC | Age: 64
End: 2019-06-03

## 2019-06-03 DIAGNOSIS — R05.9 COUGH: Primary | ICD-10-CM

## 2019-06-03 DIAGNOSIS — R05.9 COUGH: ICD-10-CM

## 2019-06-03 DIAGNOSIS — R06.2 WHEEZING: ICD-10-CM

## 2019-06-03 RX ORDER — PRAVASTATIN SODIUM 40 MG
40 TABLET ORAL DAILY
Qty: 60 TABLET | Refills: 0 | Status: SHIPPED | OUTPATIENT
Start: 2019-06-03 | End: 2020-03-09

## 2019-06-03 NOTE — TELEPHONE ENCOUNTER
Routing refill request to provider for review/approval because:  Labs not current:  Last done 10/5/17.  Pended 2 months and added in pharm comments to schedule.  Please authorize if appropriate.  Thanks,  Nahomy Dewitt RN

## 2019-06-03 NOTE — TELEPHONE ENCOUNTER
He told me he had a machine that he was using. I can't print from home so a DME can be ordered either tomorrow or by PCP today. Routing to PCP and rashmi ospina.     KASSIE Barakat CNP

## 2019-06-03 NOTE — TELEPHONE ENCOUNTER
Patient needing DME order for nebulizer, was prescribed albuterol neb solution but doesn't have machine. Please advise.    Phoenix Wallace CMA on 6/3/2019 at 11:13 AM

## 2019-06-03 NOTE — TELEPHONE ENCOUNTER
"Pending Prescriptions:                       Disp   Refills    albuterol (PROVENTIL) (2.5 MG/3ML) 0.083%*180 mL 0            Sig: Take 1 vial (2.5 mg) by nebulization every 4           hours as needed for shortness of breath / dyspnea           or wheezing    Last Written Prescription Date:  05/29/2019  Last Fill Quantity: 180mL,  # refills: 0   Last office visit: 1/14/2019 with prescribing provider:     Future Office Visit:      Requested Prescriptions   Pending Prescriptions Disp Refills     albuterol (PROVENTIL) (2.5 MG/3ML) 0.083% neb solution 180 mL 0     Sig: Take 1 vial (2.5 mg) by nebulization every 4 hours as needed for shortness of breath / dyspnea or wheezing       Asthma Maintenance Inhalers - Anticholinergics Failed - 6/3/2019  2:58 PM        Failed - Asthma control assessment score within normal limits in last 6 months     Please review ACT score.           Passed - Patient is age 12 years or older        Passed - Medication is active on med list        Passed - Recent (6 mo) or future (30 days) visit within the authorizing provider's specialty     Patient had office visit in the last 6 months or has a visit in the next 30 days with authorizing provider or within the authorizing provider's specialty.  See \"Patient Info\" tab in inbasket, or \"Choose Columns\" in Meds & Orders section of the refill encounter.              "

## 2019-06-03 NOTE — TELEPHONE ENCOUNTER
Reason for Call:  Medication or medication refill:    Do you use a [facility]?  Name of the pharmacy and phone number for the current request:     [facility] [address] [place] [address] 7 AT [facility]        Name of the medication requested: Nebulizer     Other request: PT saw NP Omar and got an Rx for Albuterol but needs a nebulizer     Can we leave a detailed message on this number? YES    Phone number patient can be reached at: Cell number on file:    Telephone Information:   Mobile [phone]       Best Time: any    Call taken on 6/3/2019 at 11:02 AM by Rebeca Kwan

## 2019-06-04 NOTE — TELEPHONE ENCOUNTER
Left message for patient to call us back and let us know where he would like DME order faxed to or if he would like to  in clinic. Script is in waiting for response umesh Smith CMA on 6/4/2019 at 10:15 AM

## 2019-06-05 RX ORDER — ALBUTEROL SULFATE 0.83 MG/ML
2.5 SOLUTION RESPIRATORY (INHALATION) EVERY 4 HOURS PRN
Refills: 0
Start: 2019-06-05

## 2019-06-07 NOTE — TELEPHONE ENCOUNTER
Pt would like to  at [employer].       [facility] [address] [facility], [place] - [facility] [facility]      He would like to  DME there.

## 2019-07-06 ENCOUNTER — TRANSFERRED RECORDS (OUTPATIENT)
Dept: HEALTH INFORMATION MANAGEMENT | Facility: CLINIC | Age: 64
End: 2019-07-06

## 2019-07-26 ENCOUNTER — TRANSFERRED RECORDS (OUTPATIENT)
Dept: HEALTH INFORMATION MANAGEMENT | Facility: CLINIC | Age: 64
End: 2019-07-26

## 2019-08-10 DIAGNOSIS — R35.0 URINARY FREQUENCY: ICD-10-CM

## 2019-08-10 NOTE — TELEPHONE ENCOUNTER
"Requested Prescriptions   Pending Prescriptions Disp Refills     alfuzosin ER (UROXATRAL) 10 MG 24 hr tablet [Pharmacy Med Name: ALFUZOSIN ER 10MG TABLETS] 90 tablet 0     Sig: TAKE 1 TABLET BY MOUTH DAILY  Last Written Prescription Date:  5/15/19  Last Fill Quantity: 90 tab,  # refills: 0   Last office visit: 5/29/2019 with prescribing provider:  Omar   Future Office Visit:         Alpha Blockers Passed - 8/10/2019  3:23 AM        Passed - Blood pressure under 140/90 in past 12 months     BP Readings from Last 3 Encounters:   05/29/19 130/80   01/14/19 127/82   09/20/18 (!) 142/99                 Passed - Recent (12 mo) or future (30 days) visit within the authorizing provider's specialty     Patient had office visit in the last 12 months or has a visit in the next 30 days with authorizing provider or within the authorizing provider's specialty.  See \"Patient Info\" tab in inbasket, or \"Choose Columns\" in Meds & Orders section of the refill encounter.              Passed - Patient does not have Tadalafil, Vardenafil, or Sildenafil on their medication list        Passed - Medication is active on med list        Passed - Patient is 18 years of age or older          "

## 2019-08-12 RX ORDER — ALFUZOSIN HYDROCHLORIDE 10 MG/1
TABLET, EXTENDED RELEASE ORAL
Qty: 90 TABLET | Refills: 0 | Status: SHIPPED | OUTPATIENT
Start: 2019-08-12 | End: 2019-11-08

## 2019-11-08 DIAGNOSIS — R35.0 URINARY FREQUENCY: ICD-10-CM

## 2019-11-08 RX ORDER — ALFUZOSIN HYDROCHLORIDE 10 MG/1
TABLET, EXTENDED RELEASE ORAL
Qty: 90 TABLET | Refills: 1 | Status: SHIPPED | OUTPATIENT
Start: 2019-11-08 | End: 2020-05-04

## 2019-11-08 NOTE — TELEPHONE ENCOUNTER
"Requested Prescriptions   Pending Prescriptions Disp Refills     alfuzosin ER (UROXATRAL) 10 MG 24 hr tablet [Pharmacy Med Name: ALFUZOSIN ER 10MG TABLETS] 90 tablet 0     Sig: TAKE 1 TABLET BY MOUTH DAILY   Last Written Prescription Date:  8/12/2019  Last Fill Quantity: 90,  # refills: 0   Last office visit: 5/29/2019 with prescribing provider:  Omar   Future Office Visit:        Alpha Blockers Passed - 11/8/2019  3:23 AM        Passed - Blood pressure under 140/90 in past 12 months     BP Readings from Last 3 Encounters:   05/29/19 130/80   01/14/19 127/82   09/20/18 (!) 142/99                 Passed - Recent (12 mo) or future (30 days) visit within the authorizing provider's specialty     Patient has had an office visit with the authorizing provider or a provider within the authorizing providers department within the previous 12 mos or has a future within next 30 days. See \"Patient Info\" tab in inbasket, or \"Choose Columns\" in Meds & Orders section of the refill encounter.              Passed - Patient does not have Tadalafil, Vardenafil, or Sildenafil on their medication list        Passed - Medication is active on med list        Passed - Patient is 18 years of age or older        Filled per [facility] protocol.     RAUL Lee, RN  [employer] [facility] [facility]      "

## 2020-03-06 DIAGNOSIS — E78.5 HYPERLIPIDEMIA LDL GOAL <100: ICD-10-CM

## 2020-03-06 RX ORDER — PRAVASTATIN SODIUM 40 MG
TABLET ORAL
Qty: 90 TABLET | OUTPATIENT
Start: 2020-03-06

## 2020-03-06 NOTE — TELEPHONE ENCOUNTER
"Last Written Prescription Date:  6/03/19  Last Fill Quantity: 60 tablet,  # refills: 0   Last office visit: 1/14/2019 with prescribing provider:  Ania   Future Office Visit:      Requested Prescriptions   Pending Prescriptions Disp Refills     pravastatin (PRAVACHOL) 40 MG tablet [Pharmacy Med Name: PRAVASTATIN 40MG TABLETS] 90 tablet      Sig: TAKE 1 TABLET BY MOUTH EVERY DAY       Statins Protocol Failed - 3/6/2020  3:24 AM        Failed - LDL on file in past 12 months     Recent Labs   Lab Test 10/05/17  1001   LDL 60             Passed - No abnormal creatine kinase in past 12 months     No lab results found.             Passed - Recent (12 mo) or future (30 days) visit within the authorizing provider's specialty     Patient has had an office visit with the authorizing provider or a provider within the authorizing providers department within the previous 12 mos or has a future within next 30 days. See \"Patient Info\" tab in inbasket, or \"Choose Columns\" in Meds & Orders section of the refill encounter.              Passed - Medication is active on med list        Passed - Patient is age 18 or older          "

## 2020-03-06 NOTE — TELEPHONE ENCOUNTER
Routing refill request to provider for review/approval because:  Nellie given x1 and patient did not follow up, please advise  Patient needs to be seen because it has been more than 1 year since last office visit.    MARLA HicksN, RN  [facility]

## 2020-03-09 RX ORDER — PRAVASTATIN SODIUM 40 MG
40 TABLET ORAL DAILY
Qty: 90 TABLET | Refills: 0 | Status: SHIPPED | OUTPATIENT
Start: 2020-03-09 | End: 2020-06-08

## 2020-03-09 NOTE — TELEPHONE ENCOUNTER
Nellie mccarty (gave #90 per protocol/appt is >30 days away as [place], [place] 4/2). Appt scheduled 4/14/2020    Mery JAIMES RN

## 2020-03-18 ENCOUNTER — TRANSFERRED RECORDS (OUTPATIENT)
Dept: HEALTH INFORMATION MANAGEMENT | Facility: CLINIC | Age: 65
End: 2020-03-18

## 2020-06-07 DIAGNOSIS — E78.5 HYPERLIPIDEMIA LDL GOAL <100: ICD-10-CM

## 2020-06-08 DIAGNOSIS — R35.0 URINARY FREQUENCY: ICD-10-CM

## 2020-06-08 RX ORDER — PRAVASTATIN SODIUM 40 MG
TABLET ORAL
Qty: 90 TABLET | Refills: 0 | Status: SHIPPED | OUTPATIENT
Start: 2020-06-08 | End: 2021-07-15

## 2020-06-08 NOTE — TELEPHONE ENCOUNTER
Routing refill request to provider for review/approval because:  Labs not current:  LDL (2017)   Aditi BELLO RN

## 2020-06-09 RX ORDER — ALFUZOSIN HYDROCHLORIDE 10 MG/1
TABLET, EXTENDED RELEASE ORAL
Start: 2020-06-09

## 2020-06-16 ENCOUNTER — VIRTUAL VISIT (OUTPATIENT)
Dept: FAMILY MEDICINE | Facility: CLINIC | Age: 65
End: 2020-06-16
Payer: MEDICARE

## 2020-06-16 DIAGNOSIS — I10 BENIGN ESSENTIAL HYPERTENSION: ICD-10-CM

## 2020-06-16 DIAGNOSIS — C92.10 CHRONIC MYELOID LEUKEMIA (H): Primary | ICD-10-CM

## 2020-06-16 DIAGNOSIS — R25.2 LEG CRAMPS: ICD-10-CM

## 2020-06-16 DIAGNOSIS — J45.20 INTERMITTENT ASTHMA, UNCOMPLICATED: ICD-10-CM

## 2020-06-16 DIAGNOSIS — I48.91 ATRIAL FIBRILLATION, UNSPECIFIED TYPE (H): ICD-10-CM

## 2020-06-16 DIAGNOSIS — E78.5 HYPERLIPIDEMIA LDL GOAL <100: ICD-10-CM

## 2020-06-16 DIAGNOSIS — N18.30 CKD (CHRONIC KIDNEY DISEASE) STAGE 3, GFR 30-59 ML/MIN (H): ICD-10-CM

## 2020-06-16 DIAGNOSIS — E11.9 TYPE 2 DIABETES MELLITUS WITHOUT COMPLICATION, WITHOUT LONG-TERM CURRENT USE OF INSULIN (H): ICD-10-CM

## 2020-06-16 DIAGNOSIS — R35.0 URINARY FREQUENCY: ICD-10-CM

## 2020-06-16 PROCEDURE — 99442 ZZC PHYSICIAN TELEPHONE EVALUATION 11-20 MIN: CPT | Performed by: INTERNAL MEDICINE

## 2020-06-16 RX ORDER — ALFUZOSIN HYDROCHLORIDE 10 MG/1
1 TABLET, EXTENDED RELEASE ORAL DAILY
Qty: 90 TABLET | Refills: 0 | Status: SHIPPED | OUTPATIENT
Start: 2020-06-16 | End: 2020-10-06

## 2020-06-16 NOTE — PROGRESS NOTES
"Mau Gómez is a 65 year old male who is being evaluated via a billable telephone visit.      The patient has been notified of following:     \"This telephone visit will be conducted via a call between you and your physician/provider. We have found that certain health care needs can be provided without the need for a physical exam.  This service lets us provide the care you need with a short phone conversation.  If a prescription is necessary we can send it directly to your pharmacy.  If lab work is needed we can place an order for that and you can then stop by our lab to have the test done at a later time.    Telephone visits are billed at different rates depending on your insurance coverage. During this emergency period, for some insurers they may be billed the same as an in-person visit.  Please reach out to your insurance provider with any questions.    If during the course of the call the physician/provider feels a telephone visit is not appropriate, you will not be charged for this service.\"    Patient has given verbal consent for Telephone visit?  Yes    What phone number would you like to be contacted at? [phone]    How would you like to obtain your AVS? Mail a copy    Subjective     Mau Gómez is a 65 year old male who presents via phone visit today for the following health issues:    This is a patient with mmp     1. Follow up diabetes,     2. Cramps, dependent on his level of hydration, taking mg.  Has been for few years.  Can be in rib cage or in legs at night.  During the day some, no mckenzie.     3. He has ckd and needs follow up labs for that.    4. Follow up asthma, he has been coughing and somewhat shortness of breath with the spring.  Using advair and not using albuterol.  Not wheezing, no fevers, chills or night sweats.  No chest pain.    5. Follow up colon polyp, due for follow up on this.    6. Follow up afib, no cv c/o and no recurrence that we know of.    7. Follow up cml, seeing Dr." Bloom for this and has been in remission.      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 Quinones then Dr. Aramis Reed in [place]     Colonic polyp 2014    [facility], fu 3 years     Hemangioma of liver 2003    seen on ct chest then ct liver showed it     HTN (hypertension)      Hx of colonoscopy 2003    nl, fu 2014 2 polyps and to fu 3 years per [facility]     Hypercholesteremia      Intermittent asthma     had fu Dr. Omid Schwartz and using zithromax in winter and since fine     Kidney stone 7/12     Lung nodule 7/12    seen on ct for kidney stone, fu 1 year, fu done Nov 13 and to fu 1 year, fu done 3/15 and gone, no fu needed     LVH (left ventricular hypertrophy) 2002    echo done for sob     Normal coronary angiogram 2008    done for cp, less then 10% lad, otherwise nl     Juan 1990's     Type II or unspecified type diabetes mellitus without mention of complication, not stated as uncontrolled      Past Surgical History:   Procedure Laterality Date     APPENDECTOMY       BONE MARROW ASPIRATION ONLY  11/14/2013    Procedure: BONE MARROW ASPIRATION ONLY;  BONE MARROW BIOPSY;  Surgeon: Wilner Salazar MD;  Location:  GI     TONSILLECTOMY       wisdom teeth removed       Social History     Socioeconomic History     Marital status:      Spouse name: Not on file     Number of children: 1     Years of education: Not on file     Highest education level: Not on file   Occupational History     Occupation: commercial real estate     Employer: [employer]   Social Needs     Financial resource strain: Not on file     Food insecurity     Worry: Not on file     Inability: Not on file     Transportation needs     Medical: Not on file      Non-medical: Not on file   Tobacco Use     Smoking status: Former Smoker     Last attempt to quit: 2007     Years since quittin.5     Smokeless tobacco: Never Used     Tobacco comment: minimal history   Substance and Sexual Activity     Alcohol use: No     Alcohol/week: 0.0 standard drinks     Drug use: No     Sexual activity: Yes     Partners: Female   Lifestyle     Physical activity     Days per week: Not on file     Minutes per session: Not on file     Stress: Not on file   Relationships     Social connections     Talks on phone: Not on file     Gets together: Not on file     Attends [employer] service: Not on file     Active member of club or organization: Not on file     Attends meetings of clubs or organizations: Not on file     Relationship status: Not on file     Intimate partner violence     Fear of current or ex partner: Not on file     Emotionally abused: Not on file     Physically abused: Not on file     Forced sexual activity: Not on file   Other Topics Concern     Parent/sibling w/ CABG, MI or angioplasty before 65F 55M? Not Asked   Social History Narrative     Not on file     Current Outpatient Medications   Medication Sig Dispense Refill     albuterol (PROVENTIL) (2.5 MG/3ML) 0.083% neb solution Take 1 vial (2.5 mg) by nebulization every 4 hours as needed for shortness of breath / dyspnea or wheezing 180 mL 0     alfuzosin ER (UROXATRAL) 10 MG 24 hr tablet TAKE 1 TABLET BY MOUTH DAILY 30 tablet 0     ascorbic acid 500 MG TABS        azithromycin (ZITHROMAX) 250 MG tablet Take 250 mg by mouth every other day.       cefdinir (OMNICEF) 300 MG capsule Take 300 mg by mouth 2 times daily       Cholecalciferol (VITAMIN D3 PO) Take 2,000 Units by mouth daily       fexofenadine (ALLEGRA) 180 MG tablet Take 180 mg by mouth daily.       fluticasone (FLONASE) 50 MCG/ACT nasal spray Spray 1 spray into both nostrils daily       fluticasone-salmeterol (ADVAIR) 250-50 MCG/DOSE inhaler Inhale 1 puff into  the lungs every 12 hours       Glucosamine-Chondroitin (GLUCOSAMINE CHONDR COMPLEX PO) Take 1,500 mg by mouth       imatinib (GLEEVEC) 400 MG tablet Take 600 mg by mouth daily       Magnesium 500 MG CAPS Take 1 tablet by mouth daily       Multiple Vitamin (DAILY MULTIVITAMIN PO) Take  by mouth daily.       order for DME Equipment being ordered: Nebulizer with tubing 1 each 0     pravastatin (PRAVACHOL) 40 MG tablet TAKE 1 TABLET(40 MG) BY MOUTH DAILY 90 tablet 0     Saw Palmetto 160 MG TABS Take  by mouth daily.       UNABLE TO FIND MEDICATION NAME: gleevac 600 mg daily       ACE/ARB NOT PRESCRIBED, INTENTIONAL, continuous prn. ACE & ARB not prescribed due to Other: BP controlled       ASPIRIN NOT PRESCRIBED, INTENTIONAL, continuous prn. Antiplatelet medication not prescribed intentionally due to Allergy 0 each 0     Allergies   Allergen Reactions     Alcohol      Aspirin Hives     Codeine Sulfate      Penicillins      FAMILY HISTORY NOTED AND REVIEWED    REVIEW OF SYSTEMS: above    PHYSICAL EXAM    There were no vitals taken for this visit.    ASSESSMENT:  1. Diabetes, not on meds, to follow up labs  2. Hypertension, control has been fine  3. Asthma, some cough and shortness of breath, suspect from allergies, doubt cv, pulmonary embolis, tumor, etc  4. Cml, follow up onc  5. Colon polyp, to get follow up  6. Elevated cholesterol on statin  7. Leg cramps, suspect statin, try off statin and call me in 3 weeks  8. Ckd, follow up labs    PLAN:  Stop statin and call update 3 weeks  Follow up for labs  Follow up onc  Exercise and diet  Colon exam    Wu Jorge M.D.  Time: 14:00

## 2020-07-08 DIAGNOSIS — R35.0 URINARY FREQUENCY: ICD-10-CM

## 2020-07-09 RX ORDER — ALFUZOSIN HYDROCHLORIDE 10 MG/1
TABLET, EXTENDED RELEASE ORAL
Qty: 30 TABLET | OUTPATIENT
Start: 2020-07-09

## 2020-07-27 ENCOUNTER — MEDICAL CORRESPONDENCE (OUTPATIENT)
Dept: HEALTH INFORMATION MANAGEMENT | Facility: CLINIC | Age: 65
End: 2020-07-27

## 2020-07-27 ENCOUNTER — TELEPHONE (OUTPATIENT)
Dept: FAMILY MEDICINE | Facility: CLINIC | Age: 65
End: 2020-07-27

## 2020-07-27 DIAGNOSIS — Z11.59 SCREENING FOR VIRAL DISEASE: Primary | ICD-10-CM

## 2020-07-27 NOTE — TELEPHONE ENCOUNTER
Reason for Call: Request for an order or referral:    Order or referral being requested: COVID serology   Date needed: before my next appointment 08.06.2020    Has the patient been seen by the PCP for this problem? NO    Additional comments: Pt is requesting a COVID serology test. Please place orders for his upcoming lab kian't    Phone number Patient can be reached at:  Cell number on file:    Telephone Information:   Mobile [phone]     Best Time:  any    Can we leave a detailed message on this number?  YES    Call taken on 7/27/2020 at 2:50 PM by Debora Tarango

## 2020-08-05 DIAGNOSIS — Z11.59 SCREENING FOR VIRAL DISEASE: ICD-10-CM

## 2020-08-05 DIAGNOSIS — I10 BENIGN ESSENTIAL HYPERTENSION: ICD-10-CM

## 2020-08-05 DIAGNOSIS — E11.9 TYPE 2 DIABETES MELLITUS WITHOUT COMPLICATION, WITHOUT LONG-TERM CURRENT USE OF INSULIN (H): ICD-10-CM

## 2020-08-05 DIAGNOSIS — E78.5 HYPERLIPIDEMIA LDL GOAL <100: ICD-10-CM

## 2020-08-05 DIAGNOSIS — N18.30 CKD (CHRONIC KIDNEY DISEASE) STAGE 3, GFR 30-59 ML/MIN (H): ICD-10-CM

## 2020-08-05 LAB
ALBUMIN SERPL-MCNC: 3.8 G/DL (ref 3.4–5)
ALP SERPL-CCNC: 84 U/L (ref 40–150)
ALT SERPL W P-5'-P-CCNC: 34 U/L (ref 0–70)
ANION GAP SERPL CALCULATED.3IONS-SCNC: 8 MMOL/L (ref 3–14)
AST SERPL W P-5'-P-CCNC: 26 U/L (ref 0–45)
BILIRUB SERPL-MCNC: 1 MG/DL (ref 0.2–1.3)
BUN SERPL-MCNC: 19 MG/DL (ref 7–30)
CALCIUM SERPL-MCNC: 8.6 MG/DL (ref 8.5–10.1)
CHLORIDE SERPL-SCNC: 109 MMOL/L (ref 94–109)
CHOLEST SERPL-MCNC: 190 MG/DL
CO2 SERPL-SCNC: 23 MMOL/L (ref 20–32)
CREAT SERPL-MCNC: 1.37 MG/DL (ref 0.66–1.25)
CREAT UR-MCNC: 272 MG/DL
GFR SERPL CREATININE-BSD FRML MDRD: 53 ML/MIN/{1.73_M2}
GLUCOSE SERPL-MCNC: 144 MG/DL (ref 70–99)
HBA1C MFR BLD: 6 % (ref 0–5.6)
HDLC SERPL-MCNC: 37 MG/DL
LDLC SERPL CALC-MCNC: 128 MG/DL
MICROALBUMIN UR-MCNC: 105 MG/L
MICROALBUMIN/CREAT UR: 38.6 MG/G CR (ref 0–17)
NONHDLC SERPL-MCNC: 153 MG/DL
POTASSIUM SERPL-SCNC: 3.8 MMOL/L (ref 3.4–5.3)
PROT SERPL-MCNC: 6.9 G/DL (ref 6.8–8.8)
SODIUM SERPL-SCNC: 140 MMOL/L (ref 133–144)
TRIGL SERPL-MCNC: 126 MG/DL
TSH SERPL DL<=0.005 MIU/L-ACNC: 2.72 MU/L (ref 0.4–4)

## 2020-08-05 PROCEDURE — 36415 COLL VENOUS BLD VENIPUNCTURE: CPT | Performed by: INTERNAL MEDICINE

## 2020-08-05 PROCEDURE — 83036 HEMOGLOBIN GLYCOSYLATED A1C: CPT | Performed by: INTERNAL MEDICINE

## 2020-08-05 PROCEDURE — 86769 SARS-COV-2 COVID-19 ANTIBODY: CPT | Performed by: INTERNAL MEDICINE

## 2020-08-05 PROCEDURE — 80061 LIPID PANEL: CPT | Performed by: INTERNAL MEDICINE

## 2020-08-05 PROCEDURE — 82043 UR ALBUMIN QUANTITATIVE: CPT | Performed by: INTERNAL MEDICINE

## 2020-08-05 PROCEDURE — 84443 ASSAY THYROID STIM HORMONE: CPT | Performed by: INTERNAL MEDICINE

## 2020-08-05 PROCEDURE — 80053 COMPREHEN METABOLIC PANEL: CPT | Performed by: INTERNAL MEDICINE

## 2020-08-06 LAB
COVID-19 SPIKE RBD ABY TITER: NORMAL
COVID-19 SPIKE RBD ABY: NEGATIVE

## 2020-08-07 NOTE — RESULT ENCOUNTER NOTE
Enclosed are your labs for your records.  For the most part they look very good.  Your urine is stable and your blood salts, thyroid test, kidney tests, liver tests and proteins are fine.  You do not have covid antibodies present.      Your cholesterol is too high.  I believe this is off the pravachol.  Please call and let me know if this is the case and if the cramps went away off it.    Your diabetes test or a1c is fine.    Overall the labs look good. Please let me know about the pravachol.   I would strongly recommend regular exercise and keeping your weight down.

## 2020-08-14 ENCOUNTER — TELEPHONE (OUTPATIENT)
Dept: FAMILY MEDICINE | Facility: CLINIC | Age: 65
End: 2020-08-14

## 2020-08-14 NOTE — TELEPHONE ENCOUNTER
Reason for Call:  Request for results:    Name of test or procedure: lab    Date of test of procedure: 8/5    Location of the test or procedure:  [facility]    OK to leave the result message on voice mail or with a family member? YES    Phone number Patient can be reached at:  Cell number on file:    Telephone Information:   Mobile [phone]       Additional comments:  Pt would like results from 8/5     Call taken on 8/14/2020 at 9:32 AM by Ben Pelayo

## 2020-09-24 ENCOUNTER — TRANSFERRED RECORDS (OUTPATIENT)
Dept: HEALTH INFORMATION MANAGEMENT | Facility: CLINIC | Age: 65
End: 2020-09-24

## 2020-10-05 DIAGNOSIS — R35.0 URINARY FREQUENCY: ICD-10-CM

## 2020-10-06 RX ORDER — ALFUZOSIN HYDROCHLORIDE 10 MG/1
TABLET, EXTENDED RELEASE ORAL
Qty: 90 TABLET | Refills: 0 | Status: SHIPPED | OUTPATIENT
Start: 2020-10-06 | End: 2021-01-04

## 2021-01-04 DIAGNOSIS — R35.0 URINARY FREQUENCY: ICD-10-CM

## 2021-01-05 RX ORDER — ALFUZOSIN HYDROCHLORIDE 10 MG/1
10 TABLET, EXTENDED RELEASE ORAL DAILY
Qty: 90 TABLET | Refills: 0 | Status: SHIPPED | OUTPATIENT
Start: 2021-01-05 | End: 2021-05-14

## 2021-01-05 NOTE — TELEPHONE ENCOUNTER
BP Readings from Last 3 Encounters:   05/29/19 130/80   01/14/19 127/82   09/20/18 (!) 142/99

## 2021-05-12 ENCOUNTER — TRANSFERRED RECORDS (OUTPATIENT)
Dept: HEALTH INFORMATION MANAGEMENT | Facility: CLINIC | Age: 66
End: 2021-05-12

## 2021-05-13 DIAGNOSIS — R35.0 URINARY FREQUENCY: ICD-10-CM

## 2021-05-14 RX ORDER — ALFUZOSIN HYDROCHLORIDE 10 MG/1
10 TABLET, EXTENDED RELEASE ORAL DAILY
Qty: 90 TABLET | Refills: 0 | Status: SHIPPED | OUTPATIENT
Start: 2021-05-14 | End: 2021-07-15

## 2021-06-24 DIAGNOSIS — R06.2 WHEEZING: ICD-10-CM

## 2021-06-24 DIAGNOSIS — R05.9 COUGH: ICD-10-CM

## 2021-06-24 RX ORDER — ALBUTEROL SULFATE 0.83 MG/ML
2.5 SOLUTION RESPIRATORY (INHALATION) EVERY 4 HOURS PRN
Qty: 180 ML | Refills: 0 | Status: SHIPPED | OUTPATIENT
Start: 2021-06-24 | End: 2022-11-28

## 2021-06-24 NOTE — TELEPHONE ENCOUNTER
Routing refill request to provider for review/approval because:  Patient needs to be seen because it has been more than 1 year since last office visit.    Called pt and scheduled for a physical 7-15-21. Pt is requesting enough Neb to get him to physical appt.     Elda Trinidad RN

## 2021-07-14 NOTE — PROGRESS NOTES
SUBJECTIVE:   Mau Gómez is a 66 year old male who presents for Preventive Visit.    The patient overall is doing well.  He is active.  He has diabetes and is not on medication.  He is not checking his sugars.  He is up-to-date on his eye exam.  He has no sores on his feet or toes or tingling or numbness.  He overall feels quite well.  He is up-to-date with oncology.    He has had a cough for the last 4 weeks.  It started with a cold.  He has asthma and is not using inhalers.  He has postnasal drainage.  No fevers or night sweats.  No sinus pain.  The cough is mostly nonproductive.  He has no other complaints.               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 Quinones then Dr. Aramis Reed in [place]     Colonic polyp 2014    [facility], fu 3 years; fu nl 9/20     Hemangioma of liver 2003    seen on ct chest then ct liver showed it     HTN (hypertension)      Hx of colonoscopy 2003    nl, fu 2014 2 polyps and to fu 3 years per [facility]; fu nl 9/20     Hypercholesteremia      Intermittent asthma     had fu Dr. Omid Schwartz and using zithromax in winter and since fine     Kidney stone 07/2012     Lung nodule 07/2012    seen on ct for kidney stone, fu 1 year, fu done Nov 13 and to fu 1 year, fu done 3/15 and gone, no fu needed     LVH (left ventricular hypertrophy) 2002    echo done for sob     Normal coronary angiogram 2008    done for cp, less then 10% lad, otherwise nl     Juan 1990's     Type II or unspecified type diabetes mellitus without mention of complication, not stated as uncontrolled              Past Surgical History:      Past Surgical History:   Procedure Laterality Date      APPENDECTOMY       BONE MARROW ASPIRATION ONLY  2013    Procedure: BONE MARROW ASPIRATION ONLY;  BONE MARROW BIOPSY;  Surgeon: Wilner Salazar MD;  Location:  GI     TONSILLECTOMY       wisdom teeth removed               Social History:     Social History     Socioeconomic History     Marital status:      Spouse name: Not on file     Number of children: 1     Years of education: Not on file     Highest education level: Not on file   Occupational History     Occupation: commercial real estate     Employer: [employer]   Tobacco Use     Smoking status: Former Smoker     Packs/day: 0.50     Years: 2.00     Pack years: 1.00     Quit date: 2009     Years since quittin.5     Smokeless tobacco: Never Used     Tobacco comment: minimal history   Substance and Sexual Activity     Alcohol use: No     Alcohol/week: 0.0 standard drinks     Drug use: No     Sexual activity: Yes     Partners: Female   Other Topics Concern     Parent/sibling w/ CABG, MI or angioplasty before 65F 55M? Not Asked   Social History Narrative     Not on file     Social Determinants of Health     Financial Resource Strain:      Difficulty of Paying Living Expenses:    Food Insecurity:      Worried About Running Out of Food in the Last Year:      Ran Out of Food in the Last Year:    Transportation Needs:      Lack of Transportation (Medical):      Lack of Transportation (Non-Medical):    Physical Activity:      Days of Exercise per Week:      Minutes of Exercise per Session:    Stress:      Feeling of Stress :    Social Connections:      Frequency of Communication with Friends and Family:      Frequency of Social Gatherings with Friends and Family:      Attends Bahai Services:      Active Member of Clubs or Organizations:      Attends Club or Organization Meetings:      Marital Status:    Intimate Partner Violence:      Fear of Current or Ex-Partner:      Emotionally Abused:      Physically Abused:      Sexually Abused:               Family History:   reviewed         Allergies:     Allergies   Allergen Reactions     Alcohol      Aspirin Hives     Codeine Sulfate      Pcn [Penicillins]              Medications:     Current Outpatient Medications   Medication Sig Dispense Refill     ACE/ARB NOT PRESCRIBED, INTENTIONAL, continuous prn. ACE & ARB not prescribed due to Other: BP controlled       albuterol (PROVENTIL) (2.5 MG/3ML) 0.083% neb solution Take 1 vial (2.5 mg) by nebulization every 4 hours as needed for shortness of breath / dyspnea or wheezing 180 mL 0     alfuzosin ER (UROXATRAL) 10 MG 24 hr tablet Take 1 tablet (10 mg) by mouth daily 90 tablet 3     ascorbic acid 500 MG TABS        ASPIRIN NOT PRESCRIBED, INTENTIONAL, continuous prn. Antiplatelet medication not prescribed intentionally due to Allergy 0 each 0     atorvastatin (LIPITOR) 40 MG tablet Take 1 tablet (40 mg) by mouth daily 90 tablet 3     Cholecalciferol (VITAMIN D3 PO) Take 2,000 Units by mouth daily       fexofenadine (ALLEGRA) 180 MG tablet Take 180 mg by mouth daily.       fluticasone (FLONASE) 50 MCG/ACT nasal spray Spray 1 spray into both nostrils daily       fluticasone-salmeterol (ADVAIR) 250-50 MCG/DOSE inhaler Inhale 1 puff into the lungs every 12 hours 1 each 1     Glucosamine-Chondroitin (GLUCOSAMINE CHONDR COMPLEX PO) Take 1,500 mg by mouth       imatinib (GLEEVEC) 400 MG tablet Take 600 mg by mouth daily       Magnesium 500 MG CAPS Take 1 tablet by mouth daily       Multiple Vitamin (DAILY MULTIVITAMIN PO) Take  by mouth daily.       order for DME Equipment being ordered: Nebulizer with tubing 1 each 0     Saw Palmetto 160 MG TABS Take  by mouth daily.       UNABLE TO FIND MEDICATION NAME: gleevac 600 mg daily       azithromycin (ZITHROMAX) 250 MG tablet Take 250 mg by mouth every other day. (Patient not taking: Reported on 7/15/2021)                 Review of Systems:   The 10 point Review of Systems is negative other than noted in the HPI            Physical Exam:   Blood pressure 124/84, pulse 84, temperature 97  F (36.1  C), temperature source Oral, height 1.829 m (6'), weight 106.6 kg (235 lb), SpO2 99 %.    Exam:  Constitutional: healthy appearing, alert and in no distress  Heent: Normocephalic. Head without obvious masses or lesions. PERRLDC, EOMI. Mouth exam within normal limits: tongue, mucous membranes, posterior pharynx all normal, no lesions or abnormalities seen.  Tm's and canals within normal limits bilaterally. Neck supple, no nuchal rigidity or masses. No supraclavicular, or cervical adenopathy. Thyroid symmetric, no masses.  Cardiovascular: Regular rate and rhythm, no murmer, rub or gallops.  JVP not elevated, no edema.  Carotids within normal limits bilaterally, no bruits.  Respiratory: Normal respiratory effort.  Lungs faint expir wheezes  Breasts: Normal bilaterally.  No masses or lesions.  Nipples within normal limites.  No axillary lesions or nodes.  Gastrointestinal: Normal active bowel sounds.   Soft, not tender, no masses, guarding or rebound.  No hepatosplenomegaly.   Genitourinary: Rectal mod bph  Musculoskeletal: extremities normal, no gross deformities noted.  Skin: no suspicious lesions or rashes   Neurologic: Mental status within normal limits.  Speech fluent.  No gross motor abnormalities and gait intact.  Psychiatric: mentation appears normal and affect normal.  Feet within normal limits, no sores, normal pulses         Data:   Labs sent        Assessment:   1. Normal complete physical exam  2. Diabetes, not on med, up to date eye exam  3. Cough, some slight wheezing, will add advair, call if not gone soon, doubt infectious, chf, pulmonary embolis, etc  4. Hypertension, controlled  5. Elevated cholesterol, on statin  6. Asthma, stable  7. Cml, in remission  8. Obesity, weight loss  9. Benign prostatic hypertrophy  10. Colon polyp, up to date on follow up  11. Afib, no issues  12. Ckd, follow up labs  13. hcm         Plan:    shingrix at pharm  Pneumovax  Add advair and call if cough not gone soon  Letter with labsx  Exercise, diet      Wu Jorge M.D.            Patient has been advised of split billing requirements and indicates understanding: Yes   Are you in the first 12 months of your Medicare coverage?  No    HPI  Do you feel safe in your environment? Yes    Have you ever done Advance Care Planning? (For example, a Health Directive, POLST, or a discussion with a medical provider or your loved ones about your wishes): Yes, advance care planning is on file.       Fall risk       Cognitive Screening   1) Repeat 3 items (Leader, Season, Table)    2) Clock draw: NORMAL  3) 3 item recall: Recalls 3 objects  Results: 3 items recalled: COGNITIVE IMPAIRMENT LESS LIKELY    Mini-CogTM Copyright S Kendall. Licensed by the author for use in [employer]; reprinted with permission (laura@.[facility]). All rights reserved.      Do you have sleep apnea, excessive snoring or daytime drowsiness?: no    Reviewed and updated as needed this visit by clinical staff                 Reviewed and updated as needed this visit by Provider                Social History     Tobacco Use     Smoking status: Former Smoker     Quit date: 2007     Years since quittin.6     Smokeless tobacco: Never Used     Tobacco comment: minimal history   Substance Use Topics     Alcohol use: No     Alcohol/week: 0.0 standard drinks     If you drink alcohol do you typically have >3 drinks per day or >7 drinks per week? No    Alcohol Use 9/15/2016   Prescreen: >3 drinks/day or >7 drinks/week? The patient does not drink >3 drinks per day nor >7 drinks per week.               Current providers sharing in care for this patient include:   Patient Care Team:  Wu Jorge MD as PCP - General (Internal Medicine)  Wu Jorge MD as Assigned PCP    The following health maintenance items are reviewed in Epic and correct as of today:  Health  Maintenance Due   Topic Date Due     ANNUAL REVIEW OF HM ORDERS  Never done     ASTHMA CONTROL TEST  Never done     ADVANCE CARE PLANNING  Never done     EYE EXAM  Never done     COVID-19 Vaccine (1) Never done     ZOSTER IMMUNIZATION (1 of 2) Never done     ASTHMA ACTION PLAN  10/23/2014     Pneumococcal Vaccine: 65+ Years (2 of 4 - PPSV23) 02/08/2016     AORTIC ANEURYSM SCREENING (SYSTEM ASSIGNED)  04/24/2018     MEDICARE ANNUAL WELLNESS VISIT  10/05/2018     DIABETIC FOOT EXAM  10/05/2018     FALL RISK ASSESSMENT  05/29/2020     PHQ-2  01/01/2021     A1C  02/05/2021     BMP  08/05/2021     LIPID  08/05/2021     MICROALBUMIN  08/05/2021         Any new diagnosis of family breast, ovarian, or bowel cancer?     FHS-7: No flowsheet data found.      Pertinent mammograms are reviewed under the imaging tab.    Review of Systems      OBJECTIVE:   There were no vitals taken for this visit. Estimated body mass index is 32.28 kg/m  as calculated from the following:    Height as of 5/29/19: 1.829 m (6').    Weight as of 5/29/19: 108 kg (238 lb).  Physical Exam          ASSESSMENT / PLAN:       Patient has been advised of split billing requirements and indicates understanding: No  COUNSELING:  Reviewed preventive health counseling, as reflected in patient instructions       Regular exercise       Healthy diet/nutrition    Estimated body mass index is 32.28 kg/m  as calculated from the following:    Height as of 5/29/19: 1.829 m (6').    Weight as of 5/29/19: 108 kg (238 lb).    Weight management plan: Discussed healthy diet and exercise guidelines    He reports that he quit smoking about 13 years ago. He has never used smokeless tobacco.      Appropriate preventive services were discussed with this patient, including applicable screening as appropriate for cardiovascular disease, diabetes, osteopenia/osteoporosis, and glaucoma.  As appropriate for age/gender, discussed screening for colorectal cancer, prostate cancer, breast  cancer, and cervical cancer. Checklist reviewing preventive services available has been given to the patient.    Reviewed patients plan of care and provided an AVS. The Basic Care Plan (routine screening as documented in Health Maintenance) for Mau meets the Care Plan requirement. This Care Plan has been established and reviewed with the Patient.    Counseling Resources:  ATP IV Guidelines  Pooled Cohorts Equation Calculator  Breast Cancer Risk Calculator  Breast Cancer: Medication to Reduce Risk  FRAX Risk Assessment  ICSI Preventive Guidelines  Dietary Guidelines for Americans, 2010  USDA's MyPlate  ASA Prophylaxis  Lung CA Screening    Wu Jorge MD  [facility]    Identified Health Risks:

## 2021-07-15 ENCOUNTER — OFFICE VISIT (OUTPATIENT)
Dept: FAMILY MEDICINE | Facility: CLINIC | Age: 66
End: 2021-07-15
Payer: MEDICARE

## 2021-07-15 ENCOUNTER — TELEPHONE (OUTPATIENT)
Dept: FAMILY MEDICINE | Facility: CLINIC | Age: 66
End: 2021-07-15

## 2021-07-15 VITALS
WEIGHT: 235 LBS | BODY MASS INDEX: 31.83 KG/M2 | HEIGHT: 72 IN | DIASTOLIC BLOOD PRESSURE: 84 MMHG | SYSTOLIC BLOOD PRESSURE: 124 MMHG | OXYGEN SATURATION: 99 % | HEART RATE: 84 BPM | TEMPERATURE: 97 F

## 2021-07-15 DIAGNOSIS — Z87.891 FORMER SMOKER: ICD-10-CM

## 2021-07-15 DIAGNOSIS — Z12.5 SCREENING FOR PROSTATE CANCER: ICD-10-CM

## 2021-07-15 DIAGNOSIS — C92.10 CHRONIC MYELOID LEUKEMIA (H): ICD-10-CM

## 2021-07-15 DIAGNOSIS — E66.9 NON MORBID OBESITY: ICD-10-CM

## 2021-07-15 DIAGNOSIS — K63.5 POLYP OF COLON, UNSPECIFIED PART OF COLON, UNSPECIFIED TYPE: ICD-10-CM

## 2021-07-15 DIAGNOSIS — J45.20 INTERMITTENT ASTHMA, UNCOMPLICATED: Primary | ICD-10-CM

## 2021-07-15 DIAGNOSIS — Z23 NEED FOR VACCINATION: ICD-10-CM

## 2021-07-15 DIAGNOSIS — E78.5 HYPERLIPIDEMIA LDL GOAL <100: ICD-10-CM

## 2021-07-15 DIAGNOSIS — I10 BENIGN ESSENTIAL HYPERTENSION: ICD-10-CM

## 2021-07-15 DIAGNOSIS — R05.9 COUGH: ICD-10-CM

## 2021-07-15 DIAGNOSIS — Z00.00 MEDICARE ANNUAL WELLNESS VISIT, SUBSEQUENT: Primary | ICD-10-CM

## 2021-07-15 DIAGNOSIS — E11.22 TYPE 2 DIABETES MELLITUS WITH CHRONIC KIDNEY DISEASE, WITHOUT LONG-TERM CURRENT USE OF INSULIN, UNSPECIFIED CKD STAGE (H): ICD-10-CM

## 2021-07-15 DIAGNOSIS — N18.30 STAGE 3 CHRONIC KIDNEY DISEASE, UNSPECIFIED WHETHER STAGE 3A OR 3B CKD (H): ICD-10-CM

## 2021-07-15 DIAGNOSIS — R35.0 URINARY FREQUENCY: ICD-10-CM

## 2021-07-15 DIAGNOSIS — I48.91 ATRIAL FIBRILLATION, UNSPECIFIED TYPE (H): ICD-10-CM

## 2021-07-15 DIAGNOSIS — J45.20 INTERMITTENT ASTHMA, UNCOMPLICATED: ICD-10-CM

## 2021-07-15 LAB
ALBUMIN SERPL-MCNC: 4.1 G/DL (ref 3.4–5)
ALP SERPL-CCNC: 92 U/L (ref 40–150)
ALT SERPL W P-5'-P-CCNC: 50 U/L (ref 0–70)
ANION GAP SERPL CALCULATED.3IONS-SCNC: 5 MMOL/L (ref 3–14)
AST SERPL W P-5'-P-CCNC: 28 U/L (ref 0–45)
BASOPHILS # BLD AUTO: 0 10E3/UL (ref 0–0.2)
BASOPHILS NFR BLD AUTO: 0 %
BILIRUB SERPL-MCNC: 1.6 MG/DL (ref 0.2–1.3)
BUN SERPL-MCNC: 21 MG/DL (ref 7–30)
CALCIUM SERPL-MCNC: 8.9 MG/DL (ref 8.5–10.1)
CHLORIDE BLD-SCNC: 109 MMOL/L (ref 94–109)
CHOLEST SERPL-MCNC: 105 MG/DL
CO2 SERPL-SCNC: 26 MMOL/L (ref 20–32)
CREAT SERPL-MCNC: 1.48 MG/DL (ref 0.66–1.25)
EOSINOPHIL # BLD AUTO: 0.2 10E3/UL (ref 0–0.7)
EOSINOPHIL NFR BLD AUTO: 3 %
ERYTHROCYTE [DISTWIDTH] IN BLOOD BY AUTOMATED COUNT: 13.8 % (ref 10–15)
FASTING STATUS PATIENT QL REPORTED: YES
GFR SERPL CREATININE-BSD FRML MDRD: 48 ML/MIN/1.73M2
GLUCOSE BLD-MCNC: 111 MG/DL (ref 70–99)
HBA1C MFR BLD: 6.1 % (ref 0–5.6)
HCT VFR BLD AUTO: 36.4 % (ref 40–53)
HDLC SERPL-MCNC: 39 MG/DL
HGB BLD-MCNC: 12.4 G/DL (ref 13.3–17.7)
LDLC SERPL CALC-MCNC: 50 MG/DL
LYMPHOCYTES # BLD AUTO: 2 10E3/UL (ref 0.8–5.3)
LYMPHOCYTES NFR BLD AUTO: 28 %
MCH RBC QN AUTO: 35.5 PG (ref 26.5–33)
MCHC RBC AUTO-ENTMCNC: 34.1 G/DL (ref 31.5–36.5)
MCV RBC AUTO: 104 FL (ref 78–100)
MONOCYTES # BLD AUTO: 0.8 10E3/UL (ref 0–1.3)
MONOCYTES NFR BLD AUTO: 10 %
NEUTROPHILS # BLD AUTO: 4.3 10E3/UL (ref 1.6–8.3)
NEUTROPHILS NFR BLD AUTO: 59 %
NONHDLC SERPL-MCNC: 66 MG/DL
PLATELET # BLD AUTO: 163 10E3/UL (ref 150–450)
POTASSIUM BLD-SCNC: 4.2 MMOL/L (ref 3.4–5.3)
PROT SERPL-MCNC: 6.6 G/DL (ref 6.8–8.8)
PSA SERPL-MCNC: 4.11 UG/L
RBC # BLD AUTO: 3.49 10E6/UL (ref 4.4–5.9)
SODIUM SERPL-SCNC: 140 MMOL/L (ref 133–144)
TRIGL SERPL-MCNC: 79 MG/DL
WBC # BLD AUTO: 7.3 10E3/UL (ref 4–11)

## 2021-07-15 PROCEDURE — 90732 PPSV23 VACC 2 YRS+ SUBQ/IM: CPT | Performed by: INTERNAL MEDICINE

## 2021-07-15 PROCEDURE — G0438 PPPS, INITIAL VISIT: HCPCS | Performed by: INTERNAL MEDICINE

## 2021-07-15 PROCEDURE — G0009 ADMIN PNEUMOCOCCAL VACCINE: HCPCS | Performed by: INTERNAL MEDICINE

## 2021-07-15 PROCEDURE — 83036 HEMOGLOBIN GLYCOSYLATED A1C: CPT | Performed by: INTERNAL MEDICINE

## 2021-07-15 PROCEDURE — 80061 LIPID PANEL: CPT | Performed by: INTERNAL MEDICINE

## 2021-07-15 PROCEDURE — 80053 COMPREHEN METABOLIC PANEL: CPT | Performed by: INTERNAL MEDICINE

## 2021-07-15 PROCEDURE — G0103 PSA SCREENING: HCPCS | Performed by: INTERNAL MEDICINE

## 2021-07-15 PROCEDURE — 36415 COLL VENOUS BLD VENIPUNCTURE: CPT | Performed by: INTERNAL MEDICINE

## 2021-07-15 PROCEDURE — 99214 OFFICE O/P EST MOD 30 MIN: CPT | Mod: 25 | Performed by: INTERNAL MEDICINE

## 2021-07-15 PROCEDURE — 82043 UR ALBUMIN QUANTITATIVE: CPT | Performed by: INTERNAL MEDICINE

## 2021-07-15 PROCEDURE — 85025 COMPLETE CBC W/AUTO DIFF WBC: CPT | Performed by: INTERNAL MEDICINE

## 2021-07-15 RX ORDER — PRAVASTATIN SODIUM 40 MG
40 TABLET ORAL DAILY
Qty: 90 TABLET | Refills: 3 | Status: CANCELLED | OUTPATIENT
Start: 2021-07-15

## 2021-07-15 RX ORDER — ALFUZOSIN HYDROCHLORIDE 10 MG/1
10 TABLET, EXTENDED RELEASE ORAL DAILY
Qty: 90 TABLET | Refills: 3 | Status: SHIPPED | OUTPATIENT
Start: 2021-07-15 | End: 2022-09-14

## 2021-07-15 RX ORDER — FLUTICASONE PROPIONATE AND SALMETEROL XINAFOATE 115; 21 UG/1; UG/1
2 AEROSOL, METERED RESPIRATORY (INHALATION) 2 TIMES DAILY
Qty: 8 G | Refills: 1 | Status: SHIPPED | OUTPATIENT
Start: 2021-07-15 | End: 2022-11-28

## 2021-07-15 RX ORDER — ATORVASTATIN CALCIUM 40 MG/1
40 TABLET, FILM COATED ORAL DAILY
Qty: 90 TABLET | Refills: 3 | Status: SHIPPED | OUTPATIENT
Start: 2021-07-15 | End: 2022-09-14

## 2021-07-15 ASSESSMENT — MIFFLIN-ST. JEOR: SCORE: 1873.95

## 2021-07-15 NOTE — TELEPHONE ENCOUNTER
Pharmacy states fluticasone-salmeterol is not covered by patient's insurance    Pharmacy lists the following as preferred alternatives:  symbicort  advair diskus  breo ellipta   advair hfa

## 2021-07-15 NOTE — LETTER
[place] [address] [address] [facility]  [address]  [place]  [address]  Tel: [phone]    July 16, 2021    Mau TUCKER Rico  [address] [place] [id number]        Dear Mr. Gómez,    It was a pleasure seeing you.  I wanted to get back to you with your test results.  I have enclosed a copy for your records.     Your diabetes test or a1c is nice and low which is great and your cholesterol is super.  Your blood salts are fine.  The kidney test or creatinine is just a bit higher this time but not worrisome.  Your liver tests and proteins are fine.  Your cbc is stable.     The psa is on the upper end of normal.  This is not unusual and is often due to one's prostate getting bigger as we age.  To be safe though I want to repeat this in 6 months.     Overall the labs are fine.  I do want to repeat some of them in 6 months so please be sure to follow up with me then.     If you have any questions please call me.       Sincerely,    Wu Jorge MD/JUAN          Enclosure: Lab Results  Results for orders placed or performed in visit on 07/15/21   Comprehensive metabolic panel     Status: Abnormal   Result Value Ref Range    Sodium 140 133 - 144 mmol/L    Potassium 4.2 3.4 - 5.3 mmol/L    Chloride 109 94 - 109 mmol/L    Carbon Dioxide (CO2) 26 20 - 32 mmol/L    Anion Gap 5 3 - 14 mmol/L    Urea Nitrogen 21 7 - 30 mg/dL    Creatinine 1.48 (H) 0.66 - 1.25 mg/dL    Calcium 8.9 8.5 - 10.1 mg/dL    Glucose 111 (H) 70 - 99 mg/dL    Alkaline Phosphatase 92 40 - 150 U/L    AST 28 0 - 45 U/L    ALT 50 0 - 70 U/L    Protein Total 6.6 (L) 6.8 - 8.8 g/dL    Albumin 4.1 3.4 - 5.0 g/dL    Bilirubin Total 1.6 (H) 0.2 - 1.3 mg/dL    GFR Estimate 48 (L) >60 mL/min/1.73m2   Hemoglobin A1c     Status: Abnormal   Result Value Ref Range    Hemoglobin A1C 6.1 (H) 0.0 - 5.6 %   Lipid Profile     Status: Abnormal   Result Value Ref Range    Cholesterol 105 <200 mg/dL    Triglycerides 79 <150 mg/dL    Direct Measure  HDL 39 (L) >=40 mg/dL    LDL Cholesterol Calculated 50 <=100 mg/dL    Non HDL Cholesterol 66 <130 mg/dL    Patient Fasting > 8hrs? Yes    PSA, screen     Status: None   Result Value Ref Range    Prostate Specific Antigen Screen 4.11 ug/L   CBC with platelets and differential     Status: Abnormal   Result Value Ref Range    WBC Count 7.3 4.0 - 11.0 10e3/uL    RBC Count 3.49 (L) 4.40 - 5.90 10e6/uL    Hemoglobin 12.4 (L) 13.3 - 17.7 g/dL    Hematocrit 36.4 (L) 40.0 - 53.0 %     (H) 78 - 100 fL    MCH 35.5 (H) 26.5 - 33.0 pg    MCHC 34.1 31.5 - 36.5 g/dL    RDW 13.8 10.0 - 15.0 %    Platelet Count 163 150 - 450 10e3/uL    % Neutrophils 59 %    % Lymphocytes 28 %    % Monocytes 10 %    % Eosinophils 3 %    % Basophils 0 %    Absolute Neutrophils 4.3 1.6 - 8.3 10e3/uL    Absolute Lymphocytes 2.0 0.8 - 5.3 10e3/uL    Absolute Monocytes 0.8 0.0 - 1.3 10e3/uL    Absolute Eosinophils 0.2 0.0 - 0.7 10e3/uL    Absolute Basophils 0.0 0.0 - 0.2 10e3/uL   CBC with Platelets & Differential     Status: Abnormal    Narrative    The following orders were created for panel order CBC with Platelets & Differential.  Procedure                               Abnormality         Status                     ---------                               -----------         ------                     CBC with platelets and d...[[id number]]  Abnormal            Final result                 Please view results for these tests on the individual orders.

## 2021-07-15 NOTE — RESULT ENCOUNTER NOTE
It was a pleasure seeing you.  I wanted to get back to you with your test results.  I have enclosed a copy for your records.    Your diabetes test or a1c is nice and low which is great and your cholesterol is super.  Your blood salts are fine.  The kidney test or creatinine is just a bit higher this time but not worrisome.  Your liver tests and proteins are fine.  Your cbc is stable.    The psa is on the upper end of normal.  This is not unusual and is often due to one's prostate getting bigger as we age.  To be safe though I want to repeat this in 6 months.    Overall the labs are fine.  I do want to repeat some of them in 6 months so please be sure to follow up with me then.    If you have any questions please call me.

## 2021-07-15 NOTE — NURSING NOTE
Prior to immunization administration, verified patients identity using patient s name and date of birth. Please see Immunization Activity for additional information.     Screening Questionnaire for Adult Immunization    Are you sick today?   Yes, cough   Do you have allergies to medications, food, a vaccine component or latex?   Yes   Have you ever had a serious reaction after receiving a vaccination?   No   Do you have a long-term health problem with heart, lung, kidney, or metabolic disease (e.g., diabetes), asthma, a blood disorder, no spleen, complement component deficiency, a cochlear implant, or a spinal fluid leak?  Are you on long-term aspirin therapy?   Yes   Do you have cancer, leukemia, HIV/AIDS, or any other immune system problem?   No   Do you have a parent, brother, or sister with an immune system problem?   No   In the past 3 months, have you taken medications that affect  your immune system, such as prednisone, other steroids, or anticancer drugs; drugs for the treatment of rheumatoid arthritis, Crohn s disease, or psoriasis; or have you had radiation treatments?   Yes   Have you had a seizure, or a brain or other nervous system problem?   No   During the past year, have you received a transfusion of blood or blood    products, or been given immune (gamma) globulin or antiviral drug?   No   For women: Are you pregnant or is there a chance you could become       pregnant during the next month?   No   Have you received any vaccinations in the past 4 weeks?   No     Immunization questionnaire was positive for at least one answer.  Notified no one.        Per orders of Dr. Jorge, injection of pneumovax 23  given by Ly Gerard CMA. Patient instructed to remain in clinic for 15 minutes afterwards, and to report any adverse reaction to me immediately.       Screening performed by Ly Gerard CMA on 7/15/2021 at 10:22 AM.

## 2021-07-15 NOTE — PATIENT INSTRUCTIONS
Take the advair and if the cough is not gone soon let me know.    I would recommend getting the new shingles shot called shingrix, but I would do it at your pharmacy as they can check with the insurance company to see if it is paid for.    Wu Jorge M.D.

## 2021-07-16 LAB
CREAT UR-MCNC: 179 MG/DL
MICROALBUMIN UR-MCNC: 76 MG/DL
MICROALBUMIN/CREAT UR: 42.46 MG/G CR (ref 0–17)

## 2021-08-24 NOTE — TELEPHONE ENCOUNTER
alfuzosin ER (UROXATRAL) 10 MG 24 hr tablet 90 tablet 3 7/15/2021  No   Sig - Route: Take 1 tablet (10 mg) by mouth daily - Oral   Sent to pharmacy as: Alfuzosin HCl ER 10 MG Oral Tablet Extended Release 24 Hour (UROXATRAL)   Class: E-Prescribe   Order: [id number]   E-Prescribing Status: Receipt confirmed by pharmacy (7/15/2021  9:22 AM CDT)     Pharmacy    [id number] [place] [address] AT [facility]     Pharmacy seeking refill of alfuzosin.  Last Rx'd on 7- good for one year .    Too soon to renew.    Fax sent back to pharmacy - check records for Rx; not due to renew.    RT Oziel (R)

## 2021-09-05 ENCOUNTER — HEALTH MAINTENANCE LETTER (OUTPATIENT)
Age: 66
End: 2021-09-05

## 2021-09-30 ENCOUNTER — TRANSCRIBE ORDERS (OUTPATIENT)
Dept: OTHER | Age: 66
End: 2021-09-30

## 2021-09-30 DIAGNOSIS — C92.10 CML (CHRONIC MYELOCYTIC LEUKEMIA) (H): Primary | ICD-10-CM

## 2021-10-01 ENCOUNTER — PRE VISIT (OUTPATIENT)
Dept: OTHER | Age: 66
End: 2021-10-01

## 2021-10-01 NOTE — TELEPHONE ENCOUNTER
Action 10/01/2021   Action Taken Request sent to [facility], will call patient later this morning and ask to sign a WILMAR  ck

## 2021-10-04 ENCOUNTER — PATIENT OUTREACH (OUTPATIENT)
Dept: ONCOLOGY | Facility: CLINIC | Age: 66
End: 2021-10-04

## 2021-10-04 NOTE — PROGRESS NOTES
Writer placed call to Graham to discuss timeline for establishing with [facility] in following Dr Reed from [facility]. 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 Not resulted in CareEverywhere yet but will be available under the [facility] banner was resulted. Graham spends winter elsewhere and is wanting to have follow-up visit anytime November 5-15 as he will be out of town for 2 weeks as of October 19th. Advised that the role of Navigation RN is to review incoming referrals and assist with placing patient as deemed appropriate after review. Graham voiced understanding and no other questions or concerns addressed. He did ask if Aziza, RN that worked with Dr Reed at [facility] would come too, advised that he would get a new RN Care Coordinator once he is seen in clinic and can expect to hear from them within 48 hours of first visit if he does not meet them in clinic. Call soft transferred to Tish in NPS for completion of scheduling.

## 2021-10-06 NOTE — TELEPHONE ENCOUNTER
Action 10/06   Action Taken Called and lvm today to remind patient to please sign WILMAR at [facility] for records.  Reminded patient his appointment is Monday.    frederic

## 2021-10-07 ENCOUNTER — TRANSFERRED RECORDS (OUTPATIENT)
Dept: HEALTH INFORMATION MANAGEMENT | Facility: CLINIC | Age: 66
End: 2021-10-07

## 2021-10-07 NOTE — TELEPHONE ENCOUNTER
RECORDS STATUS - ALL OTHER DIAGNOSIS      RECORDS RECEIVED FROM: Internal, [facility]   DATE RECEIVED:    NOTES STATUS DETAILS   OFFICE NOTE from referring provider [facility] Dr. Aramis Reed   OFFICE NOTE from medical oncologist [facility] 05/12/21 Dr. Aramis Reed    Request updated medical records 10/07/21   DISCHARGE SUMMARY from hospital     DISCHARGE REPORT from the ER     OPERATIVE REPORT     MEDICATION LIST [facility]    CLINICAL TRIAL TREATMENTS TO DATE     LABS     PATHOLOGY REPORTS     ANYTHING RELATED TO DIAGNOSIS Epic Most recent labs 07/15/21   GENONOMIC TESTING     TYPE: Epic 11/14/13- [facility]   IMAGING (NEED IMAGES & REPORT)     CT SCANS     MRI     MAMMO     ULTRASOUND     PET

## 2021-10-11 ENCOUNTER — PRE VISIT (OUTPATIENT)
Dept: ONCOLOGY | Facility: CLINIC | Age: 66
End: 2021-10-11

## 2021-10-11 ENCOUNTER — ONCOLOGY VISIT (OUTPATIENT)
Dept: ONCOLOGY | Facility: CLINIC | Age: 66
End: 2021-10-11
Attending: INTERNAL MEDICINE
Payer: MEDICARE

## 2021-10-11 VITALS
DIASTOLIC BLOOD PRESSURE: 80 MMHG | SYSTOLIC BLOOD PRESSURE: 114 MMHG | HEART RATE: 103 BPM | TEMPERATURE: 97.9 F | HEIGHT: 72 IN | WEIGHT: 233.8 LBS | RESPIRATION RATE: 16 BRPM | OXYGEN SATURATION: 99 % | BODY MASS INDEX: 31.67 KG/M2

## 2021-10-11 DIAGNOSIS — C92.10 CHRONIC MYELOID LEUKEMIA, BCR/ABL-POSITIVE, NOT HAVING ACHIEVED REMISSION (H): Primary | ICD-10-CM

## 2021-10-11 PROCEDURE — 99215 OFFICE O/P EST HI 40 MIN: CPT | Performed by: INTERNAL MEDICINE

## 2021-10-11 PROCEDURE — G0463 HOSPITAL OUTPT CLINIC VISIT: HCPCS

## 2021-10-11 ASSESSMENT — MIFFLIN-ST. JEOR: SCORE: 1868.64

## 2021-10-11 ASSESSMENT — PAIN SCALES - GENERAL: PAINLEVEL: NO PAIN (0)

## 2021-10-11 NOTE — NURSING NOTE
"Oncology Rooming Note    October 11, 2021 12:47 PM   Mau Gómez is a 66 year old male who presents for:    Chief Complaint   Patient presents with     Oncology Clinic Visit     [facility] RETURN - CML     Initial Vitals: /80 (BP Location: Right arm, Patient Position: Chair, Cuff Size: Adult Large)   Pulse 103   Temp 97.9  F (36.6  C)   Resp 16   Ht 1.829 m (6' 0.01\")   Wt 106.1 kg (233 lb 12.8 oz)   SpO2 99%   BMI 31.70 kg/m   Estimated body mass index is 31.7 kg/m  as calculated from the following:    Height as of this encounter: 1.829 m (6' 0.01\").    Weight as of this encounter: 106.1 kg (233 lb 12.8 oz). Body surface area is 2.32 meters squared.  No Pain (0) Comment: Data Unavailable   No LMP for male patient.  Allergies reviewed: Yes  Medications reviewed: Yes    Medications: Medication refills not needed today.  Pharmacy name entered into [employer]:    [facility][id number] - [place] [address] [address] AT [facility] [address]  [facility] [address] AT PORTAL TO REG[address]ED [place] [address]  [id number] - [facility] [address] AT [facility] [address]    Clinical concerns: No new concerns. Brianna was notified.      Nilton Mckenzie LPN            "

## 2021-10-11 NOTE — PROGRESS NOTES
Oncology/Hematology Visit Note  Oct 11, 2021    Reason for Visit: follow up of     ASSESSMENT:  1. Chronic myelogenous leukemia, currently on 600 mg of imatinib, with most recent PCR testing showing no BCR-able 1 transcripts.  We do find this is a deep molecular response.  This occurred on 600 mg of imatinib since his transcripts mayra while just on 400 mg.  He did have a TKI resistance panel done and this showed no obvious mutations that would lead us to think imatinib would not be effective.  Moreover he is tolerating the 600 mg of imatinib well.  I can be happier for this very nice man.  2. I think at this point we can start doing every 6 month assessments.  He lives part of the year in [place] so this would make life easier on him.  3. Recent intentional weight loss that can only be of benefit in terms of his overall quality of life.      PLAN:  1. Continue imatinib 600 mg p.o. daily  2. Return to clinic with me in 6 months with a BCR-ABL quantitative transcription assessment just prior  3. Congratulations on the weight loss    Aramis Reed MD, MSc  [place] [place] [address]  [phone]      History of Present Illness:     DIAGNOSIS:  1. Chronic myelogenous leukemia, diagnosed in 2014    THERAPY TO DATE:  1. The patient has been on imatinib since his diagnosis.  At one point his transcripts mayra and a TKI mutational panel was done which showed no mutations that would have led to a change in his TKI.  We then went up to 600 mg and he has had a remarkable response since that time.      Interval History:    Got fired from his job about 6 months ago after working there for 38 years.  He still really mad about it.    Has a 20 pound intentional weight loss because his wife is on a diet.  He feels better.    He has no issues with the imatinib.  There is no nausea.  There is no rash.    He has had 3 Covid vaccines.    He denies cough or fever.    He denies any new lumps or  bumps.    He denies early satiety.    He is a little mad that he has to pay for parking.    Review of Systems:  Patient denies any of the following except if noted above: fevers, chills, difficulty with energy, vision or hearing changes, chest pain, dyspnea, abdominal pain, nausea, vomiting, diarrhea, constipation, urinary concerns, headaches, numbness, tingling, issues with sleep or mood. He/She also denies lumps, bumps, rashes or skin lesions, bleeding or bruising issues.    Current Outpatient Medications   Medication Sig Dispense Refill     ACE/ARB NOT PRESCRIBED, INTENTIONAL, continuous prn. ACE & ARB not prescribed due to Other: BP controlled       albuterol (PROVENTIL) (2.5 MG/3ML) 0.083% neb solution Take 1 vial (2.5 mg) by nebulization every 4 hours as needed for shortness of breath / dyspnea or wheezing 180 mL 0     alfuzosin ER (UROXATRAL) 10 MG 24 hr tablet Take 1 tablet (10 mg) by mouth daily 90 tablet 3     ascorbic acid 500 MG TABS        ASPIRIN NOT PRESCRIBED, INTENTIONAL, continuous prn. Antiplatelet medication not prescribed intentionally due to Allergy 0 each 0     atorvastatin (LIPITOR) 40 MG tablet Take 1 tablet (40 mg) by mouth daily 90 tablet 3     azithromycin (ZITHROMAX) 250 MG tablet Take 250 mg by mouth every other day. (Patient not taking: Reported on 7/15/2021)       Cholecalciferol (VITAMIN D3 PO) Take 2,000 Units by mouth daily       fexofenadine (ALLEGRA) 180 MG tablet Take 180 mg by mouth daily.       fluticasone (FLONASE) 50 MCG/ACT nasal spray Spray 1 spray into both nostrils daily       fluticasone-salmeterol (ADVAIR) 250-50 MCG/DOSE inhaler Inhale 1 puff into the lungs every 12 hours 1 each 1     fluticasone-salmeterol (ADVAIR-HFA) 115-21 MCG/ACT inhaler Inhale 2 puffs into the lungs 2 times daily 8 g 1     Glucosamine-Chondroitin (GLUCOSAMINE CHONDR COMPLEX PO) Take 1,500 mg by mouth       imatinib (GLEEVEC) 400 MG tablet Take 600 mg by mouth daily       Magnesium 500 MG CAPS  Take 1 tablet by mouth daily       Multiple Vitamin (DAILY MULTIVITAMIN PO) Take  by mouth daily.       order for DME Equipment being ordered: Nebulizer with tubing 1 each 0     Saw Palmetto 160 MG TABS Take  by mouth daily.       UNABLE TO FIND MEDICATION NAME: gleevac 600 mg daily         Physical Examination:  General: The patient is a pleasant male in no acute distress.  There were no vitals taken for this visit.  Wt Readings from Last 10 Encounters:   07/15/21 106.6 kg (235 lb)   05/29/19 108 kg (238 lb)   01/14/19 109.8 kg (242 lb)   09/18/18 108.4 kg (239 lb)   09/10/18 108.4 kg (239 lb)   09/06/18 108.4 kg (239 lb)   10/05/17 109.8 kg (242 lb)   09/15/16 108 kg (238 lb)   03/09/15 109 kg (240 lb 6.4 oz)   11/19/13 103.1 kg (227 lb 6.4 oz)     Head: No concerning lesions  OP: No lesions  Neck: Supple, FROM  CV: S1 S2 RRR  Lungs: No dullness to percussion, CTA bilaterally  Abdomen: Bowel sounds present, soft, nontender with no palpable hepatosplenomegaly or masses.   Extremities: No lower extremity edema noted bilaterally.   Lymphatic: No cervical or supraclavicular adenopathyNeuro: Cranial nerves II through XII are grossly intact.  Skin: No rashes, petechiae, or bruising noted on exposed skin.  Neuro: CN 2-12 grossly intact.    Laboratory Data:  Most Recent 3 CBC's:  Recent Labs   Lab Test 07/15/21  0942 09/15/16  0923 11/14/13  0740   WBC 7.3 6.6 91.7*   HGB 12.4* 13.4 12.0*   * 99 97    157 161    Most Recent 3 BMP's:  Recent Labs   Lab Test 07/15/21  0942 08/05/20  0906 04/03/19  0000 01/06/19  0000 10/05/17  1001    140  --   --  140   POTASSIUM 4.2 3.8 4.6   < > 4.0   CHLORIDE 109 109  --   --  107   CO2 26 23  --   --  25   BUN 21 19  --   --  13   CR 1.48* 1.37* 1.42*   < > 1.34*   ANIONGAP 5 8  --   --  8   UMM 8.9 8.6  --   --  8.7   * 144* 129*   < > 144*    < > = values in this interval not displayed.    Most Recent 2 LFT's:  Recent Labs   Lab Test 07/15/21  0956  08/05/20 0906   AST 28 26   ALT 50 34   ALKPHOS 92 84   BILITOTAL 1.6* 1.0    Most Recent TSH and T4:  Recent Labs   Lab Test 08/05/20 0906   TSH 2.72     BCR ABL1 PCR QUANT:    NEGATIVE for the BCR-ABL1 e1a2 (p190), e13a2 (b2a2, p210) and e14a2      (b3a2, p210) fusion transcripts. These results do not rule out the      presence of rare BCR-ABL1 transcripts not detected by this assay.     I reviewed the above labs today.            40 minutes spent on the date of the encounter doing chart review, review of outside records, review of test results, interpretation of tests, patient visit and documentation

## 2021-10-11 NOTE — LETTER
10/11/2021         RE: Mau Gómez  [address] [address]        Dear Colleague,    Thank you for referring your patient, Mau Gómez, to the [facility]. Please see a copy of my visit note below.    Oncology/Hematology Visit Note  Oct 11, 2021    Reason for Visit: follow up of     ASSESSMENT:  1. Chronic myelogenous leukemia, currently on 600 mg of imatinib, with most recent PCR testing showing no BCR-able 1 transcripts.  We do find this is a deep molecular response.  This occurred on 600 mg of imatinib since his transcripts mayra while just on 400 mg.  He did have a TKI resistance panel done and this showed no obvious mutations that would lead us to think imatinib would not be effective.  Moreover he is tolerating the 600 mg of imatinib well.  I can be happier for this very nice man.  2. I think at this point we can start doing every 6 month assessments.  He lives part of the year in [place] so this would make life easier on him.  3. Recent intentional weight loss that can only be of benefit in terms of his overall quality of life.      PLAN:  1. Continue imatinib 600 mg p.o. daily  2. Return to clinic with me in 6 months with a BCR-ABL quantitative transcription assessment just prior  3. Congratulations on the weight loss    Aramis Reed MD, MSc  [facility]  [address] [place] [id number] [phone]      History of Present Illness:     DIAGNOSIS:  1. Chronic myelogenous leukemia, diagnosed in 2014    THERAPY TO DATE:  1. The patient has been on imatinib since his diagnosis.  At one point his transcripts mayra and a TKI mutational panel was done which showed no mutations that would have led to a change in his TKI.  We then went up to 600 mg and he has had a remarkable response since that time.      Interval History:    Got fired from his job about 6 months ago after working there for 38 years.  He still really mad about  it.    Has a 20 pound intentional weight loss because his wife is on a diet.  He feels better.    He has no issues with the imatinib.  There is no nausea.  There is no rash.    He has had 3 Covid vaccines.    He denies cough or fever.    He denies any new lumps or bumps.    He denies early satiety.    He is a little mad that he has to pay for parking.    Review of Systems:  Patient denies any of the following except if noted above: fevers, chills, difficulty with energy, vision or hearing changes, chest pain, dyspnea, abdominal pain, nausea, vomiting, diarrhea, constipation, urinary concerns, headaches, numbness, tingling, issues with sleep or mood. He/She also denies lumps, bumps, rashes or skin lesions, bleeding or bruising issues.    Current Outpatient Medications   Medication Sig Dispense Refill     ACE/ARB NOT PRESCRIBED, INTENTIONAL, continuous prn. ACE & ARB not prescribed due to Other: BP controlled       albuterol (PROVENTIL) (2.5 MG/3ML) 0.083% neb solution Take 1 vial (2.5 mg) by nebulization every 4 hours as needed for shortness of breath / dyspnea or wheezing 180 mL 0     alfuzosin ER (UROXATRAL) 10 MG 24 hr tablet Take 1 tablet (10 mg) by mouth daily 90 tablet 3     ascorbic acid 500 MG TABS        ASPIRIN NOT PRESCRIBED, INTENTIONAL, continuous prn. Antiplatelet medication not prescribed intentionally due to Allergy 0 each 0     atorvastatin (LIPITOR) 40 MG tablet Take 1 tablet (40 mg) by mouth daily 90 tablet 3     azithromycin (ZITHROMAX) 250 MG tablet Take 250 mg by mouth every other day. (Patient not taking: Reported on 7/15/2021)       Cholecalciferol (VITAMIN D3 PO) Take 2,000 Units by mouth daily       fexofenadine (ALLEGRA) 180 MG tablet Take 180 mg by mouth daily.       fluticasone (FLONASE) 50 MCG/ACT nasal spray Spray 1 spray into both nostrils daily       fluticasone-salmeterol (ADVAIR) 250-50 MCG/DOSE inhaler Inhale 1 puff into the lungs every 12 hours 1 each 1     fluticasone-salmeterol  (ADVAIR-HFA) 115-21 MCG/ACT inhaler Inhale 2 puffs into the lungs 2 times daily 8 g 1     Glucosamine-Chondroitin (GLUCOSAMINE CHONDR COMPLEX PO) Take 1,500 mg by mouth       imatinib (GLEEVEC) 400 MG tablet Take 600 mg by mouth daily       Magnesium 500 MG CAPS Take 1 tablet by mouth daily       Multiple Vitamin (DAILY MULTIVITAMIN PO) Take  by mouth daily.       order for DME Equipment being ordered: Nebulizer with tubing 1 each 0     Saw Palmetto 160 MG TABS Take  by mouth daily.       UNABLE TO FIND MEDICATION NAME: gleevac 600 mg daily         Physical Examination:  General: The patient is a pleasant male in no acute distress.  There were no vitals taken for this visit.  Wt Readings from Last 10 Encounters:   07/15/21 106.6 kg (235 lb)   05/29/19 108 kg (238 lb)   01/14/19 109.8 kg (242 lb)   09/18/18 108.4 kg (239 lb)   09/10/18 108.4 kg (239 lb)   09/06/18 108.4 kg (239 lb)   10/05/17 109.8 kg (242 lb)   09/15/16 108 kg (238 lb)   03/09/15 109 kg (240 lb 6.4 oz)   11/19/13 103.1 kg (227 lb 6.4 oz)     Head: No concerning lesions  OP: No lesions  Neck: Supple, FROM  CV: S1 S2 RRR  Lungs: No dullness to percussion, CTA bilaterally  Abdomen: Bowel sounds present, soft, nontender with no palpable hepatosplenomegaly or masses.   Extremities: No lower extremity edema noted bilaterally.   Lymphatic: No cervical or supraclavicular adenopathyNeuro: Cranial nerves II through XII are grossly intact.  Skin: No rashes, petechiae, or bruising noted on exposed skin.  Neuro: CN 2-12 grossly intact.    Laboratory Data:  Most Recent 3 CBC's:  Recent Labs   Lab Test 07/15/21  0942 09/15/16  0923 11/14/13  0740   WBC 7.3 6.6 91.7*   HGB 12.4* 13.4 12.0*   * 99 97    157 161    Most Recent 3 BMP's:  Recent Labs   Lab Test 07/15/21  0942 08/05/20  0906 04/03/19  0000 01/06/19  0000 10/05/17  1001    140  --   --  140   POTASSIUM 4.2 3.8 4.6   < > 4.0   CHLORIDE 109 109  --   --  107   CO2 26 23  --   --  25    BUN 21 19  --   --  13   CR 1.48* 1.37* 1.42*   < > 1.34*   ANIONGAP 5 8  --   --  8   UMM 8.9 8.6  --   --  8.7   * 144* 129*   < > 144*    < > = values in this interval not displayed.    Most Recent 2 LFT's:  Recent Labs   Lab Test 07/15/21  0942 08/05/20 0906   AST 28 26   ALT 50 34   ALKPHOS 92 84   BILITOTAL 1.6* 1.0    Most Recent TSH and T4:  Recent Labs   Lab Test 08/05/20  0906   TSH 2.72     BCR ABL1 PCR QUANT:    NEGATIVE for the BCR-ABL1 e1a2 (p190), e13a2 (b2a2, p210) and e14a2      (b3a2, p210) fusion transcripts. These results do not rule out the      presence of rare BCR-ABL1 transcripts not detected by this assay.     I reviewed the above labs today.            40 minutes spent on the date of the encounter doing chart review, review of outside records, review of test results, interpretation of tests, patient visit and documentation       Again, thank you for allowing me to participate in the care of your patient.        Sincerely,        Aramis Reed MD

## 2021-11-02 ENCOUNTER — TRANSFERRED RECORDS (OUTPATIENT)
Dept: HEALTH INFORMATION MANAGEMENT | Facility: CLINIC | Age: 66
End: 2021-11-02
Payer: MEDICARE

## 2021-11-10 ENCOUNTER — TRANSFERRED RECORDS (OUTPATIENT)
Dept: HEALTH INFORMATION MANAGEMENT | Facility: CLINIC | Age: 66
End: 2021-11-10
Payer: MEDICARE

## 2021-11-18 ENCOUNTER — TRANSFERRED RECORDS (OUTPATIENT)
Dept: HEALTH INFORMATION MANAGEMENT | Facility: CLINIC | Age: 66
End: 2021-11-18
Payer: MEDICARE

## 2021-12-06 ENCOUNTER — TELEPHONE (OUTPATIENT)
Dept: ONCOLOGY | Facility: CLINIC | Age: 66
End: 2021-12-06
Payer: MEDICARE

## 2021-12-06 ENCOUNTER — DOCUMENTATION ONLY (OUTPATIENT)
Dept: ONCOLOGY | Facility: CLINIC | Age: 66
End: 2021-12-06
Payer: MEDICARE

## 2021-12-06 DIAGNOSIS — C92.10 CHRONIC MYELOID LEUKEMIA, BCR/ABL-POSITIVE, NOT HAVING ACHIEVED REMISSION (H): Primary | ICD-10-CM

## 2021-12-06 RX ORDER — IMATINIB MESYLATE 100 MG/1
200 TABLET, FILM COATED ORAL DAILY
Qty: 180 TABLET | Refills: 4 | Status: SHIPPED | OUTPATIENT
Start: 2021-12-06

## 2021-12-06 RX ORDER — IMATINIB MESYLATE 400 MG/1
400 TABLET, FILM COATED ORAL DAILY
Qty: 90 TABLET | Refills: 4 | Status: SHIPPED | OUTPATIENT
Start: 2021-12-06

## 2021-12-06 NOTE — TELEPHONE ENCOUNTER
Prior Authorization Approval    Authorization Effective Date: 9/7/2021  Authorization Expiration Date: 12/6/2022  Medication: Imatinib - APPROVED  Approved Dose/Quantity: 45/30 DS  Reference #: [id number]   Insurance Company: [employer] Part D - Phone [phone] Fax [phone]  Expected CoPay: $2035.54     CoPay Card Available:      Foundation Assistance Needed:    Which Pharmacy is filling the prescription (Not needed for infusion/clinic administered):    Pharmacy Notified: No  Patient Notified: No

## 2021-12-06 NOTE — TELEPHONE ENCOUNTER
PA Initiation    Medication: Imatinib   Insurance Company: [employer] Part D - Phone [phone] Fax [phone]  Pharmacy Filling the Rx:    Filling Pharmacy Phone:    Filling Pharmacy Fax:    Start Date: 12/6/2021

## 2021-12-07 ENCOUNTER — MYC MEDICAL ADVICE (OUTPATIENT)
Dept: ONCOLOGY | Facility: CLINIC | Age: 66
End: 2021-12-07
Payer: MEDICARE

## 2021-12-29 ENCOUNTER — TELEPHONE (OUTPATIENT)
Dept: ONCOLOGY | Facility: CLINIC | Age: 66
End: 2021-12-29
Payer: MEDICARE

## 2021-12-29 NOTE — TELEPHONE ENCOUNTER
I have reviewed and agree to the information submitted for this Free Drug Application.     Romi Rai, PharmD, [facility]  Oral Chemotherapy Monitoring Program  [facility]  [phone]  December 29, 2021

## 2021-12-29 NOTE — TELEPHONE ENCOUNTER
Free Drug Application Initiated  Medication Gleevac  Sponsor [facility]  Phone # [phone]  Fax # [phone]  Additional Information  Called [facility] 12/29: will not process til after 01/01/2022    Please verify the rx on the application for free medication for this patient.  Thank  you!

## 2022-02-04 NOTE — TELEPHONE ENCOUNTER
In benefits investigation stage, they have everything they need for reenrollment and next step will be insurance verification.

## 2022-02-20 ENCOUNTER — HEALTH MAINTENANCE LETTER (OUTPATIENT)
Age: 67
End: 2022-02-20

## 2022-04-07 ENCOUNTER — TRANSFERRED RECORDS (OUTPATIENT)
Dept: HEALTH INFORMATION MANAGEMENT | Facility: CLINIC | Age: 67
End: 2022-04-07
Payer: MEDICARE

## 2022-04-11 ENCOUNTER — LAB (OUTPATIENT)
Dept: LAB | Facility: CLINIC | Age: 67
End: 2022-04-11
Attending: INTERNAL MEDICINE
Payer: MEDICARE

## 2022-04-11 DIAGNOSIS — C92.10 CHRONIC MYELOID LEUKEMIA, BCR/ABL-POSITIVE, NOT HAVING ACHIEVED REMISSION (H): ICD-10-CM

## 2022-04-11 LAB
ALBUMIN SERPL-MCNC: 3.7 G/DL (ref 3.4–5)
ALP SERPL-CCNC: 82 U/L (ref 40–150)
ALT SERPL W P-5'-P-CCNC: 52 U/L (ref 0–70)
ANION GAP SERPL CALCULATED.3IONS-SCNC: 5 MMOL/L (ref 3–14)
AST SERPL W P-5'-P-CCNC: 50 U/L (ref 0–45)
BASOPHILS # BLD AUTO: 0 10E3/UL (ref 0–0.2)
BASOPHILS NFR BLD AUTO: 0 %
BILIRUB SERPL-MCNC: 1.2 MG/DL (ref 0.2–1.3)
BUN SERPL-MCNC: 18 MG/DL (ref 7–30)
CALCIUM SERPL-MCNC: 8.9 MG/DL (ref 8.5–10.1)
CHLORIDE BLD-SCNC: 112 MMOL/L (ref 94–109)
CO2 SERPL-SCNC: 28 MMOL/L (ref 20–32)
CREAT SERPL-MCNC: 1.53 MG/DL (ref 0.66–1.25)
EOSINOPHIL # BLD AUTO: 0.2 10E3/UL (ref 0–0.7)
EOSINOPHIL NFR BLD AUTO: 3 %
ERYTHROCYTE [DISTWIDTH] IN BLOOD BY AUTOMATED COUNT: 13.8 % (ref 10–15)
GFR SERPL CREATININE-BSD FRML MDRD: 49 ML/MIN/1.73M2
GLUCOSE BLD-MCNC: 124 MG/DL (ref 70–99)
HCT VFR BLD AUTO: 34.9 % (ref 40–53)
HGB BLD-MCNC: 11.5 G/DL (ref 13.3–17.7)
IMM GRANULOCYTES # BLD: 0 10E3/UL
IMM GRANULOCYTES NFR BLD: 0 %
LYMPHOCYTES # BLD AUTO: 2.5 10E3/UL (ref 0.8–5.3)
LYMPHOCYTES NFR BLD AUTO: 35 %
MAGNESIUM SERPL-MCNC: 1.9 MG/DL (ref 1.6–2.3)
MCH RBC QN AUTO: 34.5 PG (ref 26.5–33)
MCHC RBC AUTO-ENTMCNC: 33 G/DL (ref 31.5–36.5)
MCV RBC AUTO: 105 FL (ref 78–100)
MONOCYTES # BLD AUTO: 0.7 10E3/UL (ref 0–1.3)
MONOCYTES NFR BLD AUTO: 10 %
NEUTROPHILS # BLD AUTO: 3.7 10E3/UL (ref 1.6–8.3)
NEUTROPHILS NFR BLD AUTO: 52 %
NRBC # BLD AUTO: 0 10E3/UL
NRBC BLD AUTO-RTO: 0 /100
PHOSPHATE SERPL-MCNC: 2.4 MG/DL (ref 2.5–4.5)
PLATELET # BLD AUTO: 122 10E3/UL (ref 150–450)
POTASSIUM BLD-SCNC: 4.2 MMOL/L (ref 3.4–5.3)
PROT SERPL-MCNC: 6.3 G/DL (ref 6.8–8.8)
RBC # BLD AUTO: 3.33 10E6/UL (ref 4.4–5.9)
SODIUM SERPL-SCNC: 145 MMOL/L (ref 133–144)
WBC # BLD AUTO: 7.1 10E3/UL (ref 4–11)

## 2022-04-11 PROCEDURE — 36415 COLL VENOUS BLD VENIPUNCTURE: CPT | Performed by: PATHOLOGY

## 2022-04-11 PROCEDURE — 81170 ABL1 GENE: CPT | Mod: 90 | Performed by: PATHOLOGY

## 2022-04-11 PROCEDURE — 85025 COMPLETE CBC W/AUTO DIFF WBC: CPT | Performed by: PATHOLOGY

## 2022-04-11 PROCEDURE — 99000 SPECIMEN HANDLING OFFICE-LAB: CPT | Performed by: PATHOLOGY

## 2022-04-11 PROCEDURE — 84100 ASSAY OF PHOSPHORUS: CPT | Performed by: PATHOLOGY

## 2022-04-11 PROCEDURE — 83735 ASSAY OF MAGNESIUM: CPT | Performed by: PATHOLOGY

## 2022-04-11 PROCEDURE — 80053 COMPREHEN METABOLIC PANEL: CPT | Performed by: PATHOLOGY

## 2022-04-20 LAB
BCR/ABL1 KINASE DOMAIN MUT ANL BLD/T: NORMAL
SPECIMEN SOURCE: NORMAL

## 2022-05-16 ENCOUNTER — ONCOLOGY VISIT (OUTPATIENT)
Dept: ONCOLOGY | Facility: CLINIC | Age: 67
End: 2022-05-16
Attending: INTERNAL MEDICINE
Payer: MEDICARE

## 2022-05-16 VITALS
SYSTOLIC BLOOD PRESSURE: 137 MMHG | WEIGHT: 229 LBS | HEART RATE: 82 BPM | DIASTOLIC BLOOD PRESSURE: 78 MMHG | RESPIRATION RATE: 18 BRPM | BODY MASS INDEX: 31.05 KG/M2 | OXYGEN SATURATION: 98 % | TEMPERATURE: 97.2 F

## 2022-05-16 DIAGNOSIS — C92.10 CHRONIC MYELOID LEUKEMIA, BCR/ABL-POSITIVE, NOT HAVING ACHIEVED REMISSION (H): Primary | ICD-10-CM

## 2022-05-16 PROCEDURE — 99214 OFFICE O/P EST MOD 30 MIN: CPT | Performed by: INTERNAL MEDICINE

## 2022-05-16 PROCEDURE — G0463 HOSPITAL OUTPT CLINIC VISIT: HCPCS

## 2022-05-16 ASSESSMENT — PAIN SCALES - GENERAL: PAINLEVEL: NO PAIN (0)

## 2022-05-16 NOTE — NURSING NOTE
"Oncology Rooming Note    May 16, 2022 11:47 AM   Mau Gómez is a 67 year old male who presents for:    Chief Complaint   Patient presents with     Oncology Clinic Visit     CML     Initial Vitals: /78   Pulse 82   Temp 97.2  F (36.2  C) (Oral)   Resp 18   Wt 103.9 kg (229 lb)   SpO2 98%   BMI 31.05 kg/m   Estimated body mass index is 31.05 kg/m  as calculated from the following:    Height as of 10/11/21: 1.829 m (6' 0.01\").    Weight as of this encounter: 103.9 kg (229 lb). Body surface area is 2.3 meters squared.  No Pain (0) Comment: Data Unavailable   No LMP for male patient.  Allergies reviewed: Yes  Medications reviewed: Yes    Medications: Medication refills not needed today.  Pharmacy name entered into EPIC:    [id number] - [place] [address] AT [facility]  [facility] - [place], [place] - [address] AT [address] TO [address]ED [address]  [id number] - [facility] [address] AT [facility] [address]  [facility] - [place], [place] - [address]            "
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
## 2022-05-21 ENCOUNTER — DOCUMENTATION ONLY (OUTPATIENT)
Dept: ONCOLOGY | Facility: CLINIC | Age: 67
End: 2022-05-21
Payer: MEDICARE

## 2022-07-12 ENCOUNTER — TRANSFERRED RECORDS (OUTPATIENT)
Dept: HEALTH INFORMATION MANAGEMENT | Facility: CLINIC | Age: 67
End: 2022-07-12

## 2022-08-16 ENCOUNTER — TRANSFERRED RECORDS (OUTPATIENT)
Dept: HEALTH INFORMATION MANAGEMENT | Facility: CLINIC | Age: 67
End: 2022-08-16

## 2022-09-07 ENCOUNTER — TRANSFERRED RECORDS (OUTPATIENT)
Dept: HEALTH INFORMATION MANAGEMENT | Facility: CLINIC | Age: 67
End: 2022-09-07

## 2022-09-12 ENCOUNTER — TELEPHONE (OUTPATIENT)
Dept: FAMILY MEDICINE | Facility: CLINIC | Age: 67
End: 2022-09-12

## 2022-09-12 DIAGNOSIS — R35.0 URINARY FREQUENCY: ICD-10-CM

## 2022-09-12 DIAGNOSIS — E78.5 HYPERLIPIDEMIA LDL GOAL <100: ICD-10-CM

## 2022-09-12 NOTE — TELEPHONE ENCOUNTER
Reason for Call:  Appointment, Requested Provider:  Dr. Jorge    PCP: Wu Jorge    Reason for visit: either med check or annual wellness (whichever appt gets him in sooner, per patient)    Duration of symptoms: n/a    Have you been treated for this in the past? Yes    Additional comments: Patient requesting an appointment as soon as possible so he can continue to get refills on his medication. Dr. Jorge is booking to January. Patient explained he is gone from 12/1/22 to May 2023. He also will be out of town October 20th to November 10th. He asked if Dr. Jorge can work him in for an appointment.    Can we leave a detailed message on this number? YES    Phone number patient can be reached at: Cell number on file:    Telephone Information:   Mobile [phone]       Best Time: Anytime    Call taken on 9/12/2022 at 9:24 AM by Tawanna Alcazar

## 2022-09-12 NOTE — TELEPHONE ENCOUNTER
Reason for Call:  Medication or medication refill:    Do you use a [facility]?  Name of the pharmacy and phone number for the current request:  [place]: [address], [place], [place]     Name of the medication requested: atorvastatin (LIPITOR)    Other request: Patient is completely out of medication and needs a refill as soon as possible.    Can we leave a detailed message on this number? YES    Phone number patient can be reached at: Cell number on file:    Telephone Information:   Mobile [phone]       Best Time: Anytime    Call taken on 9/12/2022 at 9:21 AM by Tawanna Alcazar

## 2022-09-14 ENCOUNTER — MYC MEDICAL ADVICE (OUTPATIENT)
Dept: FAMILY MEDICINE | Facility: CLINIC | Age: 67
End: 2022-09-14

## 2022-09-14 RX ORDER — ALFUZOSIN HYDROCHLORIDE 10 MG/1
10 TABLET, EXTENDED RELEASE ORAL DAILY
Qty: 90 TABLET | Refills: 0 | Status: SHIPPED | OUTPATIENT
Start: 2022-09-14 | End: 2022-12-13

## 2022-09-14 RX ORDER — ATORVASTATIN CALCIUM 40 MG/1
40 TABLET, FILM COATED ORAL DAILY
Qty: 90 TABLET | Refills: 0 | Status: SHIPPED | OUTPATIENT
Start: 2022-09-14 | End: 2022-12-13

## 2022-09-14 NOTE — TELEPHONE ENCOUNTER
Pt is out of medications pended and requesting refill before future appt 11/11/2022.    Please advice on approval of autumn refill.     Also can medication check appt be changed to physical?     Routing refill request to provider for review/approval because:  Labs not current:  LDL  Patient needs to be seen because it has been more than 1 year since last office visit.    LDL Cholesterol Calculated   Date Value Ref Range Status   07/15/2021 50 <=100 mg/dL Final     Comment:     Age 0-19 years:  Desirable: 0-110 mg/dL   Borderline high: 110-129 mg/dL   High: >= 130 mg/dL    Age 20 years and older:  Desirable: <100mg/dL  Above desirable: 100-129 mg/dL   Borderline high: 130-159 mg/dL   High: 160-189 mg/dL   Very high: >= 190 mg/dL   08/05/2020 128 (H) <100 mg/dL Final     Comment:     Above desirable:  100-129 mg/dl  Borderline High:  130-159 mg/dL  High:             160-189 mg/dL  Very high:       >189 mg/dl           Future Appointments 9/14/2022 - 3/13/2023              Date Visit Type Length Department Provider     11/11/2022  9:30 AM OFFICE VISIT 30 min CS FAMILY PRAC/Wu Bishop MD    Location Instructions:     [facility] is in [address] of the [facility] at [address] This is just south of [facility] and the [facility] exit off of [address]. Free parking is available; access the lot by turning east from [facility] onto [address]. Through the main entrance, the clinic is directly to the left.              11/16/2022 10:00 AM LAB PERIPHERAL 15 min UC LAB INTAKE  [facility] LAB DRAW              11/21/2022 11:30 AM RETURN 30 min  ONCOLOGY ADULT Aramis Reed MD    Location Instructions:     Located in the Clinics and [facility] Center at [address] [place] [address]. For parking options, enter the [facility] /arrival plaza from [facility] and attendants can assist you based on your needs.  parking  Pulmonary CV progress Note: 4/18/2022        [facility]                                                     Admission Date: 4/12/2022     The patient's chart is reviewed and the patient is discussed with the staff. Background: 75 y.o. y/o male  has a past medical history of Elevated blood pressure (7/5/2016), Heart murmur (7/5/2016), Hyperlipemia (7/5/2016), Small testicle (7/5/2016), Tobacco dependency (7/5/2016), and Varicocele (7/5/2016). S/P CABG x 5 4 Days Post-Op  Pt underwent CABG x5 on 4/14 by Dr. Alessandra Ojeda. His preop spirometry revealed restriction. He has a 20 pack year history of smoking. Subjective:     Pt is sitting up in chair on 1L O2. Pt reportedly was on RA but desatted to 87%. Pt is pulling 1500cc on IS. He is coughing but still not able to cough up sputum. He reports that he is not going to smoke when he goes home. He is urinating well and has had a BM. He wants to go home tomorrow. Objective:   Blood pressure 117/66, pulse 66, temperature 98.2 °F (36.8 °C), resp. rate 18, height 6' (1.829 m), weight 187 lb 12.8 oz (85.2 kg), SpO2 94 %. Intake/Output Summary (Last 24 hours) at 4/18/2022 1058  Last data filed at 4/17/2022 1728  Gross per 24 hour   Intake 390 ml   Output 450 ml   Net -60 ml     Physical Exam:          Constitutional:  Awake, calm, on 1L O2  EENMT:  Sclera clear, pupils equal  Respiratory: RRR  Cardiovascular:  RRR with no M,G,R;  Gastrointestinal:  soft; + bowel sounds present  Musculoskeletal:  warm with no cyanosis, no lower extremity edema.    SKIN:  no jaundice or ecchymosis   Neurologic:  moving all extremities, fluent speech  Psychiatric:  calm    CXR:       Recent Labs     04/17/22  0310   WBC 15.3*   HGB 11.8*   HCT 35.7*        Recent Labs     04/18/22  0620 04/17/22  0310 04/16/22  0507   NA  --  138  --    K  --  4.2  --    CL  --  105  --    CO2  --  28  --    GLU  --  99  --    BUN  --  20  --    CREA  --  1.00  --    MG 2.5* 2.4 2.4   CA  --  8.3  --      No results for input(s): LCAD, LAC in the last 72 hours. Results from Hospital Encounter encounter on 04/12/22    ECHO ADULT COMPLETE    Interpretation Summary    Left Ventricle: Left ventricle size is normal. Normal wall thickness. Normal left ventricular systolic function with a visually estimated EF of 55 - 60%. Normal diastolic function. Mild hypokinesis of the following segments: mid inferolateral.    Mitral Valve: Mild transvalvular regurgitation.   Tricuspid Valve: Mild transvalvular regurgitation.   Left Atrium: Left atrium is mildly dilated. Assessment and Plan :  (Medical Decision Making)   Impression: 75 y.o. y/o male s/p CV surgery for S/P CABG x 5; 4 Days Post-Op    Principal Problem:    S/P CABG x 5 (4/14/2022)    Per primary    Active Problems:    Hyperlipemia (7/5/2016)    Chronic       Tobacco dependency (7/5/2016)    Smoking cessation stressed      History of stroke (7/12/2018)    Prior history       NSTEMI (non-ST elevation myocardial infarction) ([facility] [address] (4/14/2022)    S/p CABG x 5      Atelectasis (4/14/2022)    Continue IS, check CXR in the AM      Hypoxia (4/14/2022)    Wean O2 as tolerated, restriction seen on spirometry preoperatively      More than 50% of the time documented was spent in face-to-face contact with the patient and in the care of the patient on the floor/unit where the patient is located. SKINNY Ramos     I have spoken with and examined the patient. I agree with the above assessment and plan as documented. Gen: pleasant on 1lpm  Lungs:  CTAB  Heart:  RRR with no Murmur/Rubs/Gallops  Abd:NTND  Ext: no edema    Agree with CXR in  Am. Prior CT in March suggestive of chronic pulmonary fibrosis with emphysema and this may explain hypoxemia.     Waleska Ventura MD is available for those with limited mobility M -F&nbsp; from 7 a.m. to 5 p.m. Due to short staffing, we are unable to offer  to all patients/visitors. Visit [email or web address] for more details.  Self-parking:&nbsp;  West Lot: Located across from the main entrance, this is a convenient option for patients. Enter on [facility]. Parking attendants available most hours to assist.&nbsp;     [facility] Ramp: Enter at the [facility] [address] entrance (one block north of the [facility] main entrance). Do not enter the ramp from [facility] - this entrance is not staffed and is further from the [facility] main entrance.

## 2022-09-16 ENCOUNTER — TRANSFERRED RECORDS (OUTPATIENT)
Dept: HEALTH INFORMATION MANAGEMENT | Facility: CLINIC | Age: 67
End: 2022-09-16

## 2022-10-23 ENCOUNTER — HEALTH MAINTENANCE LETTER (OUTPATIENT)
Age: 67
End: 2022-10-23

## 2022-11-01 ENCOUNTER — TRANSFERRED RECORDS (OUTPATIENT)
Dept: HEALTH INFORMATION MANAGEMENT | Facility: CLINIC | Age: 67
End: 2022-11-01

## 2022-11-08 DIAGNOSIS — C92.10 CHRONIC MYELOID LEUKEMIA, BCR/ABL-POSITIVE, NOT HAVING ACHIEVED REMISSION (H): Primary | ICD-10-CM

## 2022-11-09 ENCOUNTER — LAB (OUTPATIENT)
Dept: LAB | Facility: CLINIC | Age: 67
End: 2022-11-09
Attending: INTERNAL MEDICINE
Payer: MEDICARE

## 2022-11-09 DIAGNOSIS — C92.10 CHRONIC MYELOID LEUKEMIA, BCR/ABL-POSITIVE, NOT HAVING ACHIEVED REMISSION (H): ICD-10-CM

## 2022-11-09 LAB
ALBUMIN SERPL BCG-MCNC: 4.1 G/DL (ref 3.5–5.2)
ALP SERPL-CCNC: 87 U/L (ref 40–129)
ALT SERPL W P-5'-P-CCNC: 29 U/L (ref 10–50)
ANION GAP SERPL CALCULATED.3IONS-SCNC: 12 MMOL/L (ref 7–15)
AST SERPL W P-5'-P-CCNC: 36 U/L (ref 10–50)
BASOPHILS # BLD AUTO: 0 10E3/UL (ref 0–0.2)
BASOPHILS NFR BLD AUTO: 0 %
BILIRUB SERPL-MCNC: 1.1 MG/DL
BUN SERPL-MCNC: 16.7 MG/DL (ref 8–23)
CALCIUM SERPL-MCNC: 8.6 MG/DL (ref 8.8–10.2)
CHLORIDE SERPL-SCNC: 107 MMOL/L (ref 98–107)
CREAT SERPL-MCNC: 1.62 MG/DL (ref 0.67–1.17)
DEPRECATED HCO3 PLAS-SCNC: 21 MMOL/L (ref 22–29)
EOSINOPHIL # BLD AUTO: 0.1 10E3/UL (ref 0–0.7)
EOSINOPHIL NFR BLD AUTO: 2 %
ERYTHROCYTE [DISTWIDTH] IN BLOOD BY AUTOMATED COUNT: 13.4 % (ref 10–15)
GFR SERPL CREATININE-BSD FRML MDRD: 46 ML/MIN/1.73M2
GLUCOSE SERPL-MCNC: 178 MG/DL (ref 70–99)
HCT VFR BLD AUTO: 34.8 % (ref 40–53)
HGB BLD-MCNC: 11.5 G/DL (ref 13.3–17.7)
IMM GRANULOCYTES # BLD: 0 10E3/UL
IMM GRANULOCYTES NFR BLD: 0 %
LAB DIRECTOR COMMENTS: NORMAL
LAB DIRECTOR DISCLAIMER: NORMAL
LAB DIRECTOR INTERPRETATION: NORMAL
LAB DIRECTOR METHODOLOGY: NORMAL
LAB DIRECTOR RESULTS: NORMAL
LYMPHOCYTES # BLD AUTO: 2.3 10E3/UL (ref 0.8–5.3)
LYMPHOCYTES NFR BLD AUTO: 35 %
MAGNESIUM SERPL-MCNC: 1.8 MG/DL (ref 1.7–2.3)
MCH RBC QN AUTO: 34.4 PG (ref 26.5–33)
MCHC RBC AUTO-ENTMCNC: 33 G/DL (ref 31.5–36.5)
MCV RBC AUTO: 104 FL (ref 78–100)
MONOCYTES # BLD AUTO: 0.5 10E3/UL (ref 0–1.3)
MONOCYTES NFR BLD AUTO: 7 %
NEUTROPHILS # BLD AUTO: 3.8 10E3/UL (ref 1.6–8.3)
NEUTROPHILS NFR BLD AUTO: 56 %
NRBC # BLD AUTO: 0 10E3/UL
NRBC BLD AUTO-RTO: 0 /100
PHOSPHATE SERPL-MCNC: 1.9 MG/DL (ref 2.5–4.5)
PLATELET # BLD AUTO: 117 10E3/UL (ref 150–450)
POTASSIUM SERPL-SCNC: 3.7 MMOL/L (ref 3.4–5.3)
PROT SERPL-MCNC: 5.9 G/DL (ref 6.4–8.3)
RBC # BLD AUTO: 3.34 10E6/UL (ref 4.4–5.9)
SODIUM SERPL-SCNC: 140 MMOL/L (ref 136–145)
SPECIMEN DESCRIPTION: NORMAL
WBC # BLD AUTO: 6.7 10E3/UL (ref 4–11)

## 2022-11-09 PROCEDURE — 84100 ASSAY OF PHOSPHORUS: CPT

## 2022-11-09 PROCEDURE — 85025 COMPLETE CBC W/AUTO DIFF WBC: CPT

## 2022-11-09 PROCEDURE — 83735 ASSAY OF MAGNESIUM: CPT

## 2022-11-09 PROCEDURE — 80053 COMPREHEN METABOLIC PANEL: CPT

## 2022-11-09 PROCEDURE — 81206 BCR/ABL1 GENE MAJOR BP: CPT

## 2022-11-09 PROCEDURE — 36415 COLL VENOUS BLD VENIPUNCTURE: CPT

## 2022-11-09 NOTE — NURSING NOTE
Chief Complaint   Patient presents with     Lab Only     Labs drawn with  by rn.       Labs drawn with  by rn.  Pt tolerated well.      Jojo Alston RN

## 2022-11-11 ENCOUNTER — TRANSFERRED RECORDS (OUTPATIENT)
Dept: HEALTH INFORMATION MANAGEMENT | Facility: CLINIC | Age: 67
End: 2022-11-11

## 2022-11-11 PROCEDURE — G0452 MOLECULAR PATHOLOGY INTERPR: HCPCS | Mod: 26 | Performed by: STUDENT IN AN ORGANIZED HEALTH CARE EDUCATION/TRAINING PROGRAM

## 2022-11-14 DIAGNOSIS — C92.10 CHRONIC MYELOID LEUKEMIA, BCR/ABL-POSITIVE, NOT HAVING ACHIEVED REMISSION (H): Primary | ICD-10-CM

## 2022-11-14 RX ORDER — IMATINIB MESYLATE 100 MG/1
200 TABLET, FILM COATED ORAL DAILY
Qty: 180 TABLET | Refills: 4 | Status: SHIPPED | OUTPATIENT
Start: 2022-11-14 | End: 2022-11-21

## 2022-11-14 RX ORDER — IMATINIB MESYLATE 400 MG/1
400 TABLET, FILM COATED ORAL DAILY
Qty: 90 TABLET | Refills: 4 | Status: SHIPPED | OUTPATIENT
Start: 2022-11-14 | End: 2022-11-21

## 2022-11-15 ENCOUNTER — TRANSFERRED RECORDS (OUTPATIENT)
Dept: HEALTH INFORMATION MANAGEMENT | Facility: CLINIC | Age: 67
End: 2022-11-15
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
## 2022-11-21 ENCOUNTER — ONCOLOGY VISIT (OUTPATIENT)
Dept: ONCOLOGY | Facility: CLINIC | Age: 67
End: 2022-11-21
Attending: INTERNAL MEDICINE
Payer: MEDICARE

## 2022-11-21 VITALS
DIASTOLIC BLOOD PRESSURE: 80 MMHG | WEIGHT: 236.5 LBS | SYSTOLIC BLOOD PRESSURE: 144 MMHG | BODY MASS INDEX: 32.07 KG/M2 | RESPIRATION RATE: 16 BRPM | HEART RATE: 91 BPM | TEMPERATURE: 97.9 F | OXYGEN SATURATION: 98 %

## 2022-11-21 DIAGNOSIS — C92.10 CHRONIC MYELOID LEUKEMIA, BCR/ABL-POSITIVE, NOT HAVING ACHIEVED REMISSION (H): ICD-10-CM

## 2022-11-21 PROCEDURE — G0463 HOSPITAL OUTPT CLINIC VISIT: HCPCS

## 2022-11-21 PROCEDURE — 99214 OFFICE O/P EST MOD 30 MIN: CPT | Performed by: INTERNAL MEDICINE

## 2022-11-21 RX ORDER — IMATINIB MESYLATE 100 MG/1
200 TABLET, FILM COATED ORAL DAILY
Qty: 180 TABLET | Refills: 4 | Status: SHIPPED | OUTPATIENT
Start: 2022-11-21

## 2022-11-21 RX ORDER — IMATINIB MESYLATE 400 MG/1
400 TABLET, FILM COATED ORAL DAILY
Qty: 90 TABLET | Refills: 4 | Status: SHIPPED | OUTPATIENT
Start: 2022-11-21

## 2022-11-21 ASSESSMENT — PAIN SCALES - GENERAL: PAINLEVEL: NO PAIN (0)
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
## 2022-11-21 NOTE — NURSING NOTE
"Oncology Rooming Note    November 21, 2022 11:47 AM   Mau Gómez is a 67 year old male who presents for:    Chief Complaint   Patient presents with     Oncology Clinic Visit     Chronic myeloid leukemia, BCR/ABL-positive, not having achieved remission (H)     Initial Vitals: BP (!) 144/80 (BP Location: Right arm, Patient Position: Sitting, Cuff Size: Adult Large)   Pulse 91   Temp 97.9  F (36.6  C) (Oral)   Resp 16   Wt 107.3 kg (236 lb 8 oz)   SpO2 98%   BMI 32.07 kg/m   Estimated body mass index is 32.07 kg/m  as calculated from the following:    Height as of 10/11/21: 1.829 m (6' 0.01\").    Weight as of this encounter: 107.3 kg (236 lb 8 oz). Body surface area is 2.33 meters squared.  No Pain (0) Comment: Data Unavailable   No LMP for male patient.  Allergies reviewed: Yes  Medications reviewed: Yes    Medications: Medication refills not needed today.  Pharmacy name entered into [facility]:    [id number] - [place] [id number] AT [facility] & [facility] [address]  [facility] [place] [facility] - [address] [facility] AT PORTAL TO REGISTERED [facility] SITES  [id number] - [facility] [address] AT [facility] [address]  [facility] - [place] - [address] [facility]    Clinical concerns:  Provider Notified       Chin Barraza            "

## 2022-11-22 ENCOUNTER — TRANSFERRED RECORDS (OUTPATIENT)
Dept: HEALTH INFORMATION MANAGEMENT | Facility: CLINIC | Age: 67
End: 2022-11-22

## 2022-11-28 ENCOUNTER — VIRTUAL VISIT (OUTPATIENT)
Dept: FAMILY MEDICINE | Facility: CLINIC | Age: 67
End: 2022-11-28
Payer: MEDICARE

## 2022-11-28 DIAGNOSIS — R06.2 WHEEZING: ICD-10-CM

## 2022-11-28 DIAGNOSIS — E78.5 HYPERLIPIDEMIA LDL GOAL <100: ICD-10-CM

## 2022-11-28 DIAGNOSIS — K63.5 POLYP OF COLON, UNSPECIFIED PART OF COLON, UNSPECIFIED TYPE: ICD-10-CM

## 2022-11-28 DIAGNOSIS — I48.91 ATRIAL FIBRILLATION, UNSPECIFIED TYPE (H): ICD-10-CM

## 2022-11-28 DIAGNOSIS — N18.30 STAGE 3 CHRONIC KIDNEY DISEASE, UNSPECIFIED WHETHER STAGE 3A OR 3B CKD (H): ICD-10-CM

## 2022-11-28 DIAGNOSIS — J45.20 INTERMITTENT ASTHMA, UNCOMPLICATED: ICD-10-CM

## 2022-11-28 DIAGNOSIS — E11.22 TYPE 2 DIABETES MELLITUS WITH CHRONIC KIDNEY DISEASE, WITHOUT LONG-TERM CURRENT USE OF INSULIN, UNSPECIFIED CKD STAGE (H): Primary | ICD-10-CM

## 2022-11-28 DIAGNOSIS — C92.10 CHRONIC MYELOID LEUKEMIA, BCR/ABL-POSITIVE, NOT HAVING ACHIEVED REMISSION (H): ICD-10-CM

## 2022-11-28 DIAGNOSIS — R05.9 COUGH, UNSPECIFIED TYPE: ICD-10-CM

## 2022-11-28 DIAGNOSIS — I10 BENIGN ESSENTIAL HYPERTENSION: ICD-10-CM

## 2022-11-28 PROCEDURE — 99443 PR PHYSICIAN TELEPHONE EVALUATION 21-30 MIN: CPT | Mod: 95 | Performed by: INTERNAL MEDICINE

## 2022-11-28 RX ORDER — ALBUTEROL SULFATE 0.83 MG/ML
2.5 SOLUTION RESPIRATORY (INHALATION) EVERY 4 HOURS PRN
Qty: 180 ML | Refills: 0 | Status: SHIPPED | OUTPATIENT
Start: 2022-11-28 | End: 2024-06-25

## 2022-11-28 RX ORDER — FLUTICASONE PROPIONATE AND SALMETEROL XINAFOATE 115; 21 UG/1; UG/1
2 AEROSOL, METERED RESPIRATORY (INHALATION) 2 TIMES DAILY
Qty: 8 G | Refills: 1 | Status: SHIPPED | OUTPATIENT
Start: 2022-11-28 | End: 2024-06-25

## 2022-11-28 NOTE — PROGRESS NOTES
Graham is a 67 year old who is being evaluated via a billable telephone visit.      What phone number would you like to be contacted at?   How would you like to obtain your AVS? Elvia    I called patient.  He has had 3 different injections for his back and the last worked fairly well for his low back pain.    He has cml with reg onc follow up and doing super there. Labs done there.    He has prior afib with ablation and no recurrence.    Has has type 2 diabetes without complications and is not on meds for this.  He does not check his sugars.    He has prior colon polyps with follow up done 9/20 and normal.    He is leaving Thursday for 6 months to [facility].  He is now retired for the most part.    He has had chronic cough for years, then Dr. Schwartz added zithromax 3x a week and it went away, on it for 5 or 6 years.  He now notes cough has returned over last last 4 to 6 weeks.  He got a cold a week ago, not prior to that, sinus drainage. He is not having fever.  He is wheezing.  He is out of albuterol neb so not using that.  He has not been using the advair.  No chest pain or shortness of breath.    For 6 to 8 months he has had swollen neck glands if he does not sleep enough, if he does they go away. No fever or ns.    ASSESSMENT:  1. Diabetes, not on meds, not checking sugars  2. Cml, stable, follow up onc  3. Afib, no recurrence  4. Asthma, stable  5. Cough, acute on chronic, suspect may be asthma, doubt infection, tumor, could be sinuses  6. Ckd, has had follow up labs  7. Elevated cholesterol, follow up lipids  8. Colon polyp, up to date on follow up    PLAN:  Td and prevnar 20 in [place]  Follow up this spring in person and labs then.  Call if problems.  Try resuming the Advair which she has not been taking and if his cough is not improving let me know.  With respect to the enlarged lymph nodes he did have evaluation at oncology and they did not note any enlarged lymph nodes and that was very recent.   Since her going up and down and less concerning but if they worsen he will let me know.    Wu Jorge M.D.  21 minutes on the day of the encounter doing chart review, history and exam, documentation and further activities as noted above.

## 2022-12-01 ENCOUNTER — TELEPHONE (OUTPATIENT)
Dept: ONCOLOGY | Facility: CLINIC | Age: 67
End: 2022-12-01

## 2022-12-01 NOTE — TELEPHONE ENCOUNTER
PA Initiation    Medication: Imatinib PA renewal initiated   Insurance Company: [employer] - Phone [phone] Fax [phone]  Pharmacy Filling the Rx:    Filling Pharmacy Phone:    Filling Pharmacy Fax:    Start Date: 12/1/2022

## 2022-12-01 NOTE — TELEPHONE ENCOUNTER
Prior Authorization Approval    Authorization Effective Date: 1/1/2022  Authorization Expiration Date: 12/1/2023  Medication: Imatinib PA renewal approved   Approved Dose/Quantity: 30/30 ds  Reference #: Key: [id number]   Insurance Company: [employer] - Phone [phone] Fax [phone]  Expected CoPay:       CoPay Card Available:      Foundation Assistance Needed: free drug currently  Which Pharmacy is filling the prescription (Not needed for infusion/clinic administered):    Pharmacy Notified: No  Patient Notified: No

## 2022-12-09 ENCOUNTER — TELEPHONE (OUTPATIENT)
Dept: ONCOLOGY | Facility: CLINIC | Age: 67
End: 2022-12-09

## 2022-12-09 NOTE — TELEPHONE ENCOUNTER
Free Drug Application Initiated  Medication: Gleevac  Sponsor: [employer]  Phone #: [phone]  Fax #: [phone]  Additional Information: faxed 11/21, called 12/09- they have everything, also advised Graham to order a refill before the end of the year.

## 2022-12-12 ENCOUNTER — MYC MEDICAL ADVICE (OUTPATIENT)
Dept: FAMILY MEDICINE | Facility: CLINIC | Age: 67
End: 2022-12-12

## 2022-12-12 DIAGNOSIS — R35.0 URINARY FREQUENCY: ICD-10-CM

## 2022-12-12 DIAGNOSIS — E78.5 HYPERLIPIDEMIA LDL GOAL <100: ICD-10-CM

## 2022-12-13 RX ORDER — ATORVASTATIN CALCIUM 40 MG/1
40 TABLET, FILM COATED ORAL DAILY
Qty: 90 TABLET | Refills: 0 | Status: SHIPPED | OUTPATIENT
Start: 2022-12-13 | End: 2023-03-15

## 2022-12-13 RX ORDER — ALFUZOSIN HYDROCHLORIDE 10 MG/1
10 TABLET, EXTENDED RELEASE ORAL DAILY
Qty: 90 TABLET | Refills: 0 | Status: SHIPPED | OUTPATIENT
Start: 2022-12-13 | End: 2023-04-03

## 2022-12-16 DIAGNOSIS — E78.5 HYPERLIPIDEMIA LDL GOAL <100: ICD-10-CM

## 2022-12-16 DIAGNOSIS — R35.0 URINARY FREQUENCY: ICD-10-CM

## 2022-12-16 RX ORDER — ALFUZOSIN HYDROCHLORIDE 10 MG/1
TABLET, EXTENDED RELEASE ORAL
Qty: 90 TABLET | Refills: 0 | OUTPATIENT
Start: 2022-12-16

## 2022-12-16 RX ORDER — ATORVASTATIN CALCIUM 40 MG/1
TABLET, FILM COATED ORAL
Qty: 90 TABLET | Refills: 0 | OUTPATIENT
Start: 2022-12-16

## 2022-12-16 NOTE — TELEPHONE ENCOUNTER
This refill request is a duplicate request, previously received or sent.  Sent denial notification to pharmacy.  Jill Smalls RN  [facility] Triage Team

## 2023-01-04 NOTE — TELEPHONE ENCOUNTER
Free Drug Application Approved  Effective Dates: 01/04/2023-12/31/2023  Patient notified: yes  Additional Information:

## 2023-01-31 ENCOUNTER — VIRTUAL VISIT (OUTPATIENT)
Dept: FAMILY MEDICINE | Facility: CLINIC | Age: 68
End: 2023-01-31
Payer: MEDICARE

## 2023-01-31 ENCOUNTER — NURSE TRIAGE (OUTPATIENT)
Dept: FAMILY MEDICINE | Facility: CLINIC | Age: 68
End: 2023-01-31

## 2023-01-31 DIAGNOSIS — U07.1 COVID-19 VIRUS INFECTION: Primary | ICD-10-CM

## 2023-01-31 PROCEDURE — 99442 PR PHYSICIAN TELEPHONE EVALUATION 11-20 MIN: CPT | Mod: CS | Performed by: PHYSICIAN ASSISTANT

## 2023-01-31 ASSESSMENT — ASTHMA QUESTIONNAIRES
QUESTION_5 LAST FOUR WEEKS HOW WOULD YOU RATE YOUR ASTHMA CONTROL: WELL CONTROLLED
ACT_TOTALSCORE: 24
QUESTION_3 LAST FOUR WEEKS HOW OFTEN DID YOUR ASTHMA SYMPTOMS (WHEEZING, COUGHING, SHORTNESS OF BREATH, CHEST TIGHTNESS OR PAIN) WAKE YOU UP AT NIGHT OR EARLIER THAN USUAL IN THE MORNING: NOT AT ALL
QUESTION_2 LAST FOUR WEEKS HOW OFTEN HAVE YOU HAD SHORTNESS OF BREATH: NOT AT ALL
QUESTION_1 LAST FOUR WEEKS HOW MUCH OF THE TIME DID YOUR ASTHMA KEEP YOU FROM GETTING AS MUCH DONE AT WORK, SCHOOL OR AT HOME: NONE OF THE TIME
QUESTION_4 LAST FOUR WEEKS HOW OFTEN HAVE YOU USED YOUR RESCUE INHALER OR NEBULIZER MEDICATION (SUCH AS ALBUTEROL): NOT AT ALL
ACT_TOTALSCORE: 24

## 2023-01-31 NOTE — TELEPHONE ENCOUNTER
"CC: Patient calling reporting testing positive for covid-19 with at home test.     COVID-19 DIAGNOSIS: at home test   COVID-19 EXPOSURE: unknown  ONSET: Jan 29th   WORST SYMPTOM: \"they are all pretty mild\"  COUGH: mild cough   FEVER: denies   RESPIRATORY STATUS: denies shortness of breath, wheezing, unable to speak  BETTER-SAME-WORSE: about the same   HIGH RISK DISEASE: yes, asthma and chronic myleloid leukemia  VACCINE: yes  BOOSTER: yes 2 boosters   OTHER SYMPTOMS: reports sore throat, headache. Denies muscle aches, loss of taste or smell, chills or fatigue   O2 SATURATION MONITOR: does not have, no breathing concerns     RN COVID TREATMENT VISIT  01/31/23    Mau Gómez  67 year old  Current weight? 228    Has the patient been seen by a primary care provider at an [facility] or [facility] within the past two years? Yes.   Have you been in close proximity to/do you have a known exposure to a person with a confirmed case of influenza? No.     Date of positive COVID test (PCR or at home)?  1/29/23    Current COVID symptoms: cough, headache and sore throat    Date COVID symptoms began: 1/28/23    Do you have any of the following conditions that place you at risk of being very sick from COVID-19? 65 years or older, cancer and chronic lung disease    Is patient eligible to continue? Yes, established patient, 12 years or older weighing at least 88.2 lbs, who has COVID symptoms that started in the past 5 days and is at risk for being very sick from COVID-19.       Have you received monoclonal antibodies or oral antiviral medications since testing positive to COVID-19? No    Are you currently hospitalized for COVID-19? No    Do you have a history of hepatitis? No    Are you currently pregnant or nursing? No    Do you have a clinically significant hypersensitivity to nirmatrelvir, ritonavir, or molnupiravir? No    Do you have any history of severe renal impairment (eGFR < 30mL/min)? No    Do " you have any history of hepatic impairment or abnormalities (e.g. hepatic panel, ALT, AST, ALK Phos, bilirubin)? YES    Have you had a coronary stent placed in the previous 6 months? No    Is patient eligible to continue? No, patient does not meet all eligibility requirements for the RN COVID treatment (as denoted by yes response(s) above). Patient informed they will need a virtual provider visit to assess treatment options.     Writer scheduled patient for covid treatment virtual visit:    1/31/2023 5:00 PM (Arrive by 4:50 PM) Rimma Patel PA-C [facility]     Advised patient to call back with new or worsening symptoms in the interim, patient expressed verbal understanding and is agreeable.    Joanne Silvestre RN  [facility]    Reason for Disposition    [1] HIGH RISK for severe COVID complications (e.g., weak immune system, age > 64 years, obesity with BMI of 30 or higher, pregnant, chronic lung disease or other chronic medical condition) AND [2] COVID symptoms (e.g., cough, fever)  (Exceptions: Already seen by PCP and no new or worsening symptoms.)    Additional Information    Negative: SEVERE difficulty breathing (e.g., struggling for each breath, speaks in single words)    Negative: Difficult to awaken or acting confused (e.g., disoriented, slurred speech)    Negative: Bluish (or gray) lips or face now    Negative: Shock suspected (e.g., cold/pale/clammy skin, too weak to stand, low BP, rapid pulse)    Negative: Sounds like a life-threatening emergency to the triager    Negative: [1] Diagnosed or suspected COVID-19 AND [2] symptoms lasting 3 or more weeks    Negative: [1] COVID-19 exposure AND [2] no symptoms    Negative: COVID-19 vaccine reaction suspected (e.g., fever, headache, muscle aches) occurring 1 to 3 days after getting vaccine    Negative: COVID-19 vaccine, questions about    Negative: [1] Lives with someone known to have influenza (flu test  positive) AND [2] flu-like symptoms (e.g., cough, runny nose, sore throat, SOB; with or without fever)    Negative: [1] Adult with possible COVID-19 symptoms AND [2] triager concerned about severity of symptoms or other causes    Negative: COVID-19 and breastfeeding, questions about    Negative: SEVERE or constant chest pain or pressure  (Exception: Mild central chest pain, present only when coughing.)    Negative: MODERATE difficulty breathing (e.g., speaks in phrases, SOB even at rest, pulse 100-120)    Negative: Headache and stiff neck (can't touch chin to chest)    Negative: Oxygen level (e.g., pulse oximetry) 90 percent or lower    Negative: Chest pain or pressure  (Exception: MILD central chest pain, present only when coughing)    Negative: Patient sounds very sick or weak to the triager    Negative: MILD difficulty breathing (e.g., minimal/no SOB at rest, SOB with walking, pulse <100)    Negative: Fever > 103 F (39.4 C)    Negative: [1] Fever > 101 F (38.3 C) AND [2] over 60 years of age    Negative: [1] Fever > 100.0 F (37.8 C) AND [2] bedridden (e.g., nursing home patient, CVA, chronic illness, recovering from surgery)    Protocols used: CORONAVIRUS (COVID-19) DIAGNOSED OR [id number]

## 2023-01-31 NOTE — PATIENT INSTRUCTIONS
Stop atorvastatin (lipitor), advair and uroxatral today, while on paxlovid and for three additional days after completing.    The FDA has authorized the emergency use of certain medications for patients who are at high risk for progression to severe illness or death from COVID 19. These medications are investigational, and therefore, information is limited as they are still being studied.        COVID-19 Outpatient Treatments    Important: You can NOT be prescribed Molnupiravir or PAXLOVID if you will start taking the medicine more than 5 days after your symptoms have started.      PAXLOVID: https://www.fda.gov/media/713181/download      Please isolate according to CDC guidelines:  https://www.cdc.gov/coronavirus/2019-ncov/your-health/quarantine-isolation.html      PAXLOVID (nimatrelvir/ritonavir)  How it works  Two medicines (nirmatrelvir and ritonavir) are taken together. They stop the virus from growing. Less amount of virus is easier for your body to fight.  Benefits  Lowers risk of hospitalization and death from COVID-19.  How to take  Medicine comes in a daily container with both medicine tablets. Take by mouth twice daily (once in the morning, once at night) for 5 days.  The number of tablets to take varies by patient.  Don't chew or break capsules. Swallow hole.  When to take  Take as soon as possible after positive COVID-19 test result, and within 5 days of your first symptoms.  Who can take it  Patients must be 18 years or older, weigh at least 88 pounds and have tested positive for COVID-19.  Possible side effects  Can cause altered sense of taste, diarhea (loose, watery stools), high blood pressure, muscle aches.  Medication interactions  Several medicines may interact with PAXLOVID and may cause serious side effects.  Tell your care team about all the medicines you take, including prescription and over-the-counter medicines, vitamins and herbal supplements.  Your provider will review your medicines to  make sure that you can safely take PAXLOVID.  Cautions  PAXLOVID is not recommended for patients with severe kidney or liver disease. If you have mild kidney disease, the dose may need to be changed.  If you are pregnant or breastfeeding, talk to your care team about your options.  If you are sexually active, use effective birth control while taking PAXLOVID.          For informational purposes only. Not to replace the advice of your health care provider. Copyright   2022 [facility]. All rights reserved. Clinically reviewed by Rebeca Chaudhry. [employer] [id number] - 01/22.

## 2023-01-31 NOTE — PROGRESS NOTES
Graham is a 67 year old who is being evaluated via a billable telephone visit.      What phone number would you like to be contacted at? [phone]   How would you like to obtain your AVS? Elvia    Distant Location (provider location):  On-site    Assessment and Plan:     (U07.1) COVID-19 virus infection  (primary encounter diagnosis)  Comment: mild symptoms, fully vaccinated, interested in paxlovid  Plan: nirmatrelvir and ritonavir (PAXLOVID) therapy         pack    We discussed that he does qualify for COVID treatment and I would recommend it, however, he does not feel he can stop his Uroxatrol for the 8 days that would be required    We had a discussion regarding medication options risks and benefits and common side effects of paxlovid including potential for liver toxicity and the need to abstain from any alcohol while on it, he does not drink    He will think about it for now, I did send a prescription for Paxlovid to his pharmacy, renally dosed, if he does take it he knows to stop his atorvastatin, Uroxatrol and Advair  We did discuss reasons to be seen promptly including shortness of breath, chest pain, leg swelling        Rimma Bello PA-C        Subjective   Graham is a 67 year old, presenting for the following health issues:  Covid Concern      HPI     Graham is seeing me for covid today  His symptoms started 3 days ago  He tested using a home test Monday am and was positive  He is having intermittent headache, sore throat, chills  He denies chest pain, sob, leg swelling   He is fully vaccinated     COVID-19 Symptom Review  How many days ago did these symptoms start? 1/29/23    Are any of the following symptoms significant for you?    New or worsening difficulty breathing? No    Worsening cough? Yes, it's a dry cough.     Fever or chills? Yes, I felt feverish or had chills.    Headache: YES    Sore throat: No    Chest pain: No    Diarrhea: No    Body aches? YES    What treatments has patient tried?  None   Does patient live in a nursing home, group home, or shelter? No  Does patient have a way to get food/medications during quarantined? Yes, I have a friend or family member who can help me.    Review of Systems   See above      Objective    Vitals - Patient Reported  Pain Score: No Pain (0)      Vitals:  No vitals were obtained today due to virtual visit.    Physical Exam   No exam, phone visit         Phone call duration: 16 minutes

## 2023-03-15 DIAGNOSIS — E78.5 HYPERLIPIDEMIA LDL GOAL <100: ICD-10-CM

## 2023-03-15 RX ORDER — ATORVASTATIN CALCIUM 40 MG/1
TABLET, FILM COATED ORAL
Qty: 90 TABLET | Refills: 0 | Status: SHIPPED | OUTPATIENT
Start: 2023-03-15 | End: 2023-04-03

## 2023-04-03 DIAGNOSIS — R35.0 URINARY FREQUENCY: ICD-10-CM

## 2023-04-03 DIAGNOSIS — E78.5 HYPERLIPIDEMIA LDL GOAL <100: ICD-10-CM

## 2023-04-03 RX ORDER — ATORVASTATIN CALCIUM 40 MG/1
40 TABLET, FILM COATED ORAL DAILY
Qty: 90 TABLET | Refills: 1 | Status: SHIPPED | OUTPATIENT
Start: 2023-04-03 | End: 2023-06-16

## 2023-04-03 RX ORDER — ALFUZOSIN HYDROCHLORIDE 10 MG/1
10 TABLET, EXTENDED RELEASE ORAL DAILY
Qty: 90 TABLET | Refills: 0 | Status: SHIPPED | OUTPATIENT
Start: 2023-04-03 | End: 2023-06-16

## 2023-04-03 RX ORDER — ALFUZOSIN HYDROCHLORIDE 10 MG/1
10 TABLET, EXTENDED RELEASE ORAL DAILY
Qty: 90 TABLET | Refills: 0 | Status: CANCELLED | OUTPATIENT
Start: 2023-04-03

## 2023-04-03 NOTE — TELEPHONE ENCOUNTER
Medication Question or Refill        What medication are you calling about (include dose and sig)?: alfuzosin ER (UROXATRAL) 10 MG 24 hr tablet       Preferred Pharmacy:      [id number] [address] AT [facility] OF  & [address]  [address] [facility] [address]  Phone: [phone] Fax: [phone]      Controlled Substance Agreement on file:   CSA -- Patient Level:    CSA: None found at the patient level.       Who prescribed the medication?: Gotlieb    Do you need a refill? Yes    When did you use the medication last? unknown    Patient offered an appointment? Yes: pt scheduled on 06/16/2023    Do you have any questions or concerns?  No      Could we send this information to you in [facility] or would you prefer to receive a phone call?:   Patient would prefer a phone call   Okay to leave a detailed message?: Yes at Cell number on file:    Telephone Information:   Mobile [phone]     Janis XAVIER    [facility]

## 2023-05-09 ENCOUNTER — TRANSFERRED RECORDS (OUTPATIENT)
Dept: HEALTH INFORMATION MANAGEMENT | Facility: CLINIC | Age: 68
End: 2023-05-09
Payer: MEDICARE

## 2023-05-17 DIAGNOSIS — C92.10 CHRONIC MYELOID LEUKEMIA, BCR/ABL-POSITIVE, NOT HAVING ACHIEVED REMISSION (H): Primary | ICD-10-CM

## 2023-05-18 ENCOUNTER — LAB (OUTPATIENT)
Dept: LAB | Facility: CLINIC | Age: 68
End: 2023-05-18
Payer: MEDICARE

## 2023-05-18 ENCOUNTER — TRANSFERRED RECORDS (OUTPATIENT)
Dept: HEALTH INFORMATION MANAGEMENT | Facility: CLINIC | Age: 68
End: 2023-05-18

## 2023-05-18 DIAGNOSIS — C92.10 CHRONIC MYELOID LEUKEMIA, BCR/ABL-POSITIVE, NOT HAVING ACHIEVED REMISSION (H): ICD-10-CM

## 2023-05-18 LAB
BASOPHILS # BLD AUTO: 0 10E3/UL (ref 0–0.2)
BASOPHILS NFR BLD AUTO: 0 %
EOSINOPHIL # BLD AUTO: 0.3 10E3/UL (ref 0–0.7)
EOSINOPHIL NFR BLD AUTO: 3 %
ERYTHROCYTE [DISTWIDTH] IN BLOOD BY AUTOMATED COUNT: 13.5 % (ref 10–15)
HCT VFR BLD AUTO: 38.4 % (ref 40–53)
HGB BLD-MCNC: 12.5 G/DL (ref 13.3–17.7)
IMM GRANULOCYTES # BLD: 0 10E3/UL
IMM GRANULOCYTES NFR BLD: 0 %
LAB DIRECTOR COMMENTS: NORMAL
LAB DIRECTOR COMMENTS: NORMAL
LAB DIRECTOR DISCLAIMER: NORMAL
LAB DIRECTOR DISCLAIMER: NORMAL
LAB DIRECTOR INTERPRETATION: NORMAL
LAB DIRECTOR INTERPRETATION: NORMAL
LAB DIRECTOR METHODOLOGY: NORMAL
LAB DIRECTOR METHODOLOGY: NORMAL
LAB DIRECTOR RESULTS: NORMAL
LAB DIRECTOR RESULTS: NORMAL
LYMPHOCYTES # BLD AUTO: 2.6 10E3/UL (ref 0.8–5.3)
LYMPHOCYTES NFR BLD AUTO: 30 %
MCH RBC QN AUTO: 33.7 PG (ref 26.5–33)
MCHC RBC AUTO-ENTMCNC: 32.6 G/DL (ref 31.5–36.5)
MCV RBC AUTO: 104 FL (ref 78–100)
MONOCYTES # BLD AUTO: 0.7 10E3/UL (ref 0–1.3)
MONOCYTES NFR BLD AUTO: 9 %
NEUTROPHILS # BLD AUTO: 4.9 10E3/UL (ref 1.6–8.3)
NEUTROPHILS NFR BLD AUTO: 58 %
NRBC # BLD AUTO: 0 10E3/UL
NRBC BLD AUTO-RTO: 0 /100
PLATELET # BLD AUTO: 137 10E3/UL (ref 150–450)
RBC # BLD AUTO: 3.71 10E6/UL (ref 4.4–5.9)
SPECIMEN DESCRIPTION: NORMAL
SPECIMEN DESCRIPTION: NORMAL
WBC # BLD AUTO: 8.5 10E3/UL (ref 4–11)

## 2023-05-18 PROCEDURE — 81206 BCR/ABL1 GENE MAJOR BP: CPT

## 2023-05-18 PROCEDURE — G0452 MOLECULAR PATHOLOGY INTERPR: HCPCS | Mod: XS | Performed by: PATHOLOGY

## 2023-05-18 PROCEDURE — 85025 COMPLETE CBC W/AUTO DIFF WBC: CPT

## 2023-05-18 PROCEDURE — 81207 BCR/ABL1 GENE MINOR BP: CPT

## 2023-05-18 PROCEDURE — 36415 COLL VENOUS BLD VENIPUNCTURE: CPT

## 2023-05-18 PROCEDURE — G0452 MOLECULAR PATHOLOGY INTERPR: HCPCS | Mod: 59 | Performed by: PATHOLOGY
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
## 2023-05-25 ENCOUNTER — ONCOLOGY VISIT (OUTPATIENT)
Dept: ONCOLOGY | Facility: CLINIC | Age: 68
End: 2023-05-25
Payer: MEDICARE

## 2023-05-25 VITALS
DIASTOLIC BLOOD PRESSURE: 89 MMHG | OXYGEN SATURATION: 98 % | WEIGHT: 233.9 LBS | HEART RATE: 81 BPM | SYSTOLIC BLOOD PRESSURE: 143 MMHG | BODY MASS INDEX: 31.72 KG/M2 | RESPIRATION RATE: 16 BRPM

## 2023-05-25 DIAGNOSIS — C92.10 CHRONIC MYELOID LEUKEMIA, BCR/ABL-POSITIVE, NOT HAVING ACHIEVED REMISSION (H): Primary | ICD-10-CM

## 2023-05-25 PROCEDURE — G0463 HOSPITAL OUTPT CLINIC VISIT: HCPCS | Performed by: INTERNAL MEDICINE

## 2023-05-25 PROCEDURE — 99214 OFFICE O/P EST MOD 30 MIN: CPT | Performed by: INTERNAL MEDICINE

## 2023-05-25 ASSESSMENT — PAIN SCALES - GENERAL: PAINLEVEL: NO PAIN (0)

## 2023-05-25 NOTE — NURSING NOTE
"Oncology Rooming Note    May 25, 2023 10:12 AM   Mau Gómez is a 68 year old male who presents for:    Chief Complaint   Patient presents with     Oncology Clinic Visit     Chronic Myeloid Leukemia     Initial Vitals: BP (!) 143/89 (BP Location: Right arm, Patient Position: Sitting, Cuff Size: Adult Large)   Pulse 81   Resp 16   Wt 106.1 kg (233 lb 14.4 oz)   SpO2 98%   BMI 31.72 kg/m   Estimated body mass index is 31.72 kg/m  as calculated from the following:    Height as of 10/11/21: 1.829 m (6' 0.01\").    Weight as of this encounter: 106.1 kg (233 lb 14.4 oz). Body surface area is 2.32 meters squared.  No Pain (0) Comment: Data Unavailable   No LMP for male patient.  Allergies reviewed: Yes  Medications reviewed: Yes    Medications: Medication refills not needed today.  Pharmacy name entered into EPIC:    [id number] - [place] [address] AT [facility] & Count includes the [facility] [address]  [facility] [place] - [address] AT PORTAL TO REGISTERED [facility] [address]  [id number] - [place], [place] - [address] AT [facility] [address]  [facility] [place] [address]  [id number] - [place], [place] - [address] AT [facility] OF  & [address]    Clinical concerns: Pt presents today for f/u with Dr Reed.    Adeline Barnett, LALA  5/25/2023              "

## 2023-06-01 ENCOUNTER — TRANSFERRED RECORDS (OUTPATIENT)
Dept: MULTI SPECIALTY CLINIC | Facility: CLINIC | Age: 68
End: 2023-06-01

## 2023-06-01 ENCOUNTER — HEALTH MAINTENANCE LETTER (OUTPATIENT)
Age: 68
End: 2023-06-01

## 2023-06-01 LAB — RETINOPATHY: NORMAL

## 2023-06-12 ENCOUNTER — TRANSFERRED RECORDS (OUTPATIENT)
Dept: HEALTH INFORMATION MANAGEMENT | Facility: CLINIC | Age: 68
End: 2023-06-12
Payer: MEDICARE

## 2023-06-15 ASSESSMENT — ENCOUNTER SYMPTOMS
NERVOUS/ANXIOUS: 0
SHORTNESS OF BREATH: 0
HEMATURIA: 0
WEAKNESS: 0
FEVER: 0
EYE PAIN: 0
JOINT SWELLING: 0
CONSTIPATION: 0
DIARRHEA: 0
FREQUENCY: 0
HEMATOCHEZIA: 0
HEARTBURN: 0
PALPITATIONS: 0
PARESTHESIAS: 0
HEADACHES: 0
CHILLS: 0
ARTHRALGIAS: 1
COUGH: 0
DIZZINESS: 0
NAUSEA: 0
MYALGIAS: 1
ABDOMINAL PAIN: 0
SORE THROAT: 1
DYSURIA: 0

## 2023-06-15 ASSESSMENT — ACTIVITIES OF DAILY LIVING (ADL): CURRENT_FUNCTION: NO ASSISTANCE NEEDED

## 2023-06-16 ENCOUNTER — OFFICE VISIT (OUTPATIENT)
Dept: FAMILY MEDICINE | Facility: CLINIC | Age: 68
End: 2023-06-16
Payer: MEDICARE

## 2023-06-16 VITALS
TEMPERATURE: 97.7 F | SYSTOLIC BLOOD PRESSURE: 136 MMHG | BODY MASS INDEX: 31.42 KG/M2 | RESPIRATION RATE: 16 BRPM | HEIGHT: 72 IN | OXYGEN SATURATION: 99 % | DIASTOLIC BLOOD PRESSURE: 82 MMHG | WEIGHT: 232 LBS | HEART RATE: 74 BPM

## 2023-06-16 DIAGNOSIS — J45.20 INTERMITTENT ASTHMA, UNCOMPLICATED: ICD-10-CM

## 2023-06-16 DIAGNOSIS — R05.3 CHRONIC COUGH: ICD-10-CM

## 2023-06-16 DIAGNOSIS — E78.5 HYPERLIPIDEMIA LDL GOAL <100: ICD-10-CM

## 2023-06-16 DIAGNOSIS — C92.10 CHRONIC MYELOID LEUKEMIA, BCR/ABL-POSITIVE, NOT HAVING ACHIEVED REMISSION (H): ICD-10-CM

## 2023-06-16 DIAGNOSIS — C92.10 CHRONIC MYELOID LEUKEMIA (H): ICD-10-CM

## 2023-06-16 DIAGNOSIS — E11.22 TYPE 2 DIABETES MELLITUS WITH CHRONIC KIDNEY DISEASE, WITHOUT LONG-TERM CURRENT USE OF INSULIN, UNSPECIFIED CKD STAGE (H): ICD-10-CM

## 2023-06-16 DIAGNOSIS — R35.0 URINARY FREQUENCY: ICD-10-CM

## 2023-06-16 DIAGNOSIS — Z00.00 ENCOUNTER FOR MEDICARE ANNUAL WELLNESS EXAM: ICD-10-CM

## 2023-06-16 DIAGNOSIS — Z23 HIGH PRIORITY FOR 2019-NCOV VACCINE: ICD-10-CM

## 2023-06-16 DIAGNOSIS — Z23 NEED FOR VACCINATION: Primary | ICD-10-CM

## 2023-06-16 DIAGNOSIS — N18.30 STAGE 3 CHRONIC KIDNEY DISEASE, UNSPECIFIED WHETHER STAGE 3A OR 3B CKD (H): ICD-10-CM

## 2023-06-16 DIAGNOSIS — I10 BENIGN ESSENTIAL HYPERTENSION: ICD-10-CM

## 2023-06-16 DIAGNOSIS — I48.91 ATRIAL FIBRILLATION, UNSPECIFIED TYPE (H): ICD-10-CM

## 2023-06-16 DIAGNOSIS — D69.6 THROMBOCYTOPENIA (H): ICD-10-CM

## 2023-06-16 DIAGNOSIS — K63.5 POLYP OF COLON, UNSPECIFIED PART OF COLON, UNSPECIFIED TYPE: ICD-10-CM

## 2023-06-16 DIAGNOSIS — Z12.5 ENCOUNTER FOR SCREENING FOR MALIGNANT NEOPLASM OF PROSTATE: ICD-10-CM

## 2023-06-16 PROBLEM — R97.20 ELEVATED PROSTATE SPECIFIC ANTIGEN (PSA): Status: ACTIVE | Noted: 2021-01-01

## 2023-06-16 LAB
ALBUMIN SERPL BCG-MCNC: 4.3 G/DL (ref 3.5–5.2)
ALP SERPL-CCNC: 95 U/L (ref 40–129)
ALT SERPL W P-5'-P-CCNC: 38 U/L (ref 0–70)
ANION GAP SERPL CALCULATED.3IONS-SCNC: 11 MMOL/L (ref 7–15)
AST SERPL W P-5'-P-CCNC: 36 U/L (ref 0–45)
BILIRUB SERPL-MCNC: 1 MG/DL
BUN SERPL-MCNC: 24.5 MG/DL (ref 8–23)
CALCIUM SERPL-MCNC: 9.2 MG/DL (ref 8.8–10.2)
CHLORIDE SERPL-SCNC: 106 MMOL/L (ref 98–107)
CHOLEST SERPL-MCNC: 99 MG/DL
CREAT SERPL-MCNC: 1.7 MG/DL (ref 0.67–1.17)
CREAT UR-MCNC: 241 MG/DL
DEPRECATED HCO3 PLAS-SCNC: 25 MMOL/L (ref 22–29)
GFR SERPL CREATININE-BSD FRML MDRD: 43 ML/MIN/1.73M2
GLUCOSE SERPL-MCNC: 150 MG/DL (ref 70–99)
HBA1C MFR BLD: 6.1 % (ref 0–5.6)
HDLC SERPL-MCNC: 43 MG/DL
LDLC SERPL CALC-MCNC: 43 MG/DL
MICROALBUMIN UR-MCNC: 89.2 MG/L
MICROALBUMIN/CREAT UR: 37.01 MG/G CR (ref 0–17)
NONHDLC SERPL-MCNC: 56 MG/DL
POTASSIUM SERPL-SCNC: 3.8 MMOL/L (ref 3.4–5.3)
PROT SERPL-MCNC: 6.3 G/DL (ref 6.4–8.3)
PSA SERPL DL<=0.01 NG/ML-MCNC: 5 NG/ML (ref 0–6.5)
SODIUM SERPL-SCNC: 142 MMOL/L (ref 136–145)
TRIGL SERPL-MCNC: 65 MG/DL

## 2023-06-16 PROCEDURE — 36415 COLL VENOUS BLD VENIPUNCTURE: CPT | Performed by: INTERNAL MEDICINE

## 2023-06-16 PROCEDURE — G0439 PPPS, SUBSEQ VISIT: HCPCS | Performed by: INTERNAL MEDICINE

## 2023-06-16 PROCEDURE — 91313 COVID-19 BIVALENT 18+ (MODERNA): CPT | Performed by: INTERNAL MEDICINE

## 2023-06-16 PROCEDURE — 83036 HEMOGLOBIN GLYCOSYLATED A1C: CPT | Performed by: INTERNAL MEDICINE

## 2023-06-16 PROCEDURE — 80053 COMPREHEN METABOLIC PANEL: CPT | Performed by: INTERNAL MEDICINE

## 2023-06-16 PROCEDURE — 90472 IMMUNIZATION ADMIN EACH ADD: CPT | Performed by: INTERNAL MEDICINE

## 2023-06-16 PROCEDURE — 80061 LIPID PANEL: CPT | Performed by: INTERNAL MEDICINE

## 2023-06-16 PROCEDURE — G0009 ADMIN PNEUMOCOCCAL VACCINE: HCPCS | Mod: 59 | Performed by: INTERNAL MEDICINE

## 2023-06-16 PROCEDURE — 0134A COVID-19 BIVALENT 18+ (MODERNA): CPT | Performed by: INTERNAL MEDICINE

## 2023-06-16 PROCEDURE — 82043 UR ALBUMIN QUANTITATIVE: CPT | Performed by: INTERNAL MEDICINE

## 2023-06-16 PROCEDURE — 82570 ASSAY OF URINE CREATININE: CPT | Performed by: INTERNAL MEDICINE

## 2023-06-16 PROCEDURE — G0103 PSA SCREENING: HCPCS | Performed by: INTERNAL MEDICINE

## 2023-06-16 PROCEDURE — 90677 PCV20 VACCINE IM: CPT | Performed by: INTERNAL MEDICINE

## 2023-06-16 PROCEDURE — 90714 TD VACC NO PRESV 7 YRS+ IM: CPT | Performed by: INTERNAL MEDICINE

## 2023-06-16 RX ORDER — ATORVASTATIN CALCIUM 40 MG/1
40 TABLET, FILM COATED ORAL DAILY
Qty: 90 TABLET | Refills: 3 | Status: SHIPPED | OUTPATIENT
Start: 2023-06-16 | End: 2023-12-15

## 2023-06-16 RX ORDER — ALFUZOSIN HYDROCHLORIDE 10 MG/1
10 TABLET, EXTENDED RELEASE ORAL DAILY
Qty: 90 TABLET | Refills: 3 | Status: SHIPPED | OUTPATIENT
Start: 2023-06-16 | End: 2024-07-20

## 2023-06-16 ASSESSMENT — ACTIVITIES OF DAILY LIVING (ADL): CURRENT_FUNCTION: NO ASSISTANCE NEEDED

## 2023-06-16 ASSESSMENT — PAIN SCALES - GENERAL: PAINLEVEL: NO PAIN (0)

## 2023-06-16 NOTE — RESULT ENCOUNTER NOTE
Graham,    It was nice to see you today for your physical.  You should be able to view your test results.    Your chemistry panel overall is stable.  Your blood salts are normal.  Your creatinine or kidney test is roughly the same as a year ago at 1.7.  Your liver and protein tests are fine.  In any one with any degree of kidney issues we do like to put them on a medication called an ACE inhibitor which is a blood pressure pill that helps protect the kidneys.  It is also good for your blood pressure obviously.  It is a great medication in that it has been around for many years, it is very safe and effective and is very low cost.  A small percentage of people can get a cough with that but otherwise most people tolerate it quite well.  I would like to start you on this, again to help protect the kidneys and to lower your blood pressure.  Please let me know by sending me a note back if this would be okay to send in.    Your total cholesterol is superb at 99.  Your HDL or good cholesterol and LDL or bad cholesterol are both very good.    Your diabetes test or hemoglobin A1c is stable at 6.1.  Your urine is fine as well.    Your PSA or prostate cancer blood test is on the upper end of normal at 5.0.  It is risen slightly from 2 years ago but only minimally so I am not concerned.  We should check it again next year.    As you can see overall the tests look fine but there is room for improvement.  Please send me a note back as noted above.    Wu

## 2023-06-16 NOTE — PATIENT INSTRUCTIONS
I would recommend getting the new shingles shot called shingrix, but I would do it at your pharmacy as they can check with the insurance company to see if it is paid for.    Patient Education   Personalized Prevention Plan  You are due for the preventive services outlined below.  Your care team is available to assist you in scheduling these services.  If you have already completed any of these items, please share that information with your care team to update in your medical record.  Health Maintenance Due   Topic Date Due    ANNUAL REVIEW OF HM ORDERS  Never done    Eye Exam  Never done    Zoster (Shingles) Vaccine (1 of 2) Never done    Asthma Action Plan - yearly  10/23/2014    LUNG CANCER SCREENING  03/24/2016    AORTIC ANEURYSM SCREENING (SYSTEM ASSIGNED)  04/24/2018    A1C Lab  01/15/2022    Cholesterol Lab  07/15/2022    Kidney Microalbumin Urine Test  07/15/2022    Diabetic Foot Exam  07/15/2022    Pneumococcal Vaccine (3 - PCV) 07/15/2022    Diptheria Tetanus Pertussis (DTAP/TDAP/TD) Vaccine (2 - Td or Tdap) 07/20/2022       Exercise for a Healthier Heart  You may wonder how you can improve the health of your heart. If you re thinking about exercise, you re on the right track. You don t need to become an athlete. But you do need a certain amount of brisk exercise to help strengthen your heart. If you have been diagnosed with a heart condition, your healthcare provider may advise exercise to help your condition. To help make exercise a habit, choose safe, fun activities.      Exercise with a friend. When activity is fun, you're more likely to stick with it.     Before you start  Check with your healthcare provider before starting an exercise program. This is especially important if you haven't been active for a while. It's also important if you have a long-term (chronic) health problem such as heart disease, diabetes, or obesity. Also check with your provider if you're at high risk for having these problems.    Why exercise?  Exercising regularly offers many healthy rewards. It can help you do all of these:   Improve your blood cholesterol level to help prevent further heart trouble.  Lower your blood pressure to help prevent a stroke or heart attack.  Control diabetes or reduce your risk of getting this disease.  Improve your heart and lung function.  Reach and stay at a healthy weight.  Make your muscles stronger so you can stay active.  Prevent falls and fractures by slowing the loss of bone mass (osteoporosis).  Manage stress better.  Improve your sense of self and your body image.  Exercise tips    Ease into your routine. Set small goals. Then build on them. Talk with your healthcare provider first before starting an exercise routine if you're not sure what your activity level should be.  Exercise on most days. Aim for a total of at least 150 minutes (2 hours and 30 minutes) or more of moderate-intensity aerobic activity each week. You could also do 75 minutes (1 hour and 15 minutes) or more of vigorous-intensity aerobic activity each week. Or try for a combination of both. Moderate activity means that you breathe heavier and your heart rate increases, but you can still talk. Think about doing at least 30 minutes of moderate exercise, 5 times a week. It's OK to work up to the 30-minute period over time. Examples of moderate-intensity activity are brisk walking, gardening, and water aerobics.  Step up your daily activity level.  Along with your exercise program, try being more active the whole day. Walk instead of drive. Or park further away so that you take more steps each day. Do more household tasks or yard work. You may not be able to meet the advised amount of physical activity. But doing some moderate- or vigorous-intensity aerobic activity can help reduce your risk for heart disease. Your healthcare provider can help you figure out what is best for you.  Choose 1 or more activities you enjoy.  Walking is one  of the easiest things you can do. You can also try swimming, riding a bike, dancing, or taking an exercise class.    Call 911  Call 911 right away if any of these occur:   Chest pain that doesn't go away quickly with rest  New burning, tightness, pressure, or heaviness in your chest, neck, shoulders, back, or arms  Abnormal or severe shortness of breath  A very fast or irregular heartbeat (palpitations)  Fainting  When to call your healthcare provider  Call your healthcare provider if you have any of these:   Dizziness or lightheadedness  Mild shortness of breath or chest pain  Increased or new joint or muscle pain    [employer] last reviewed this educational content on 7/1/2022 2000-2022 [employer]. All rights reserved. This information is not intended as a substitute for professional medical care. Always follow your healthcare professional's instructions.          Signs of Hearing Loss  Hearing loss is a problem shared by many people. In fact, it's one of the most common health problems, particularly as people age. Most people aged 65 and older have some hearing loss. By age 80, almost everyone does. Hearing loss often occurs slowly over the years. So, you may not realize your hearing has gotten worse.   When sudden hearing loss occurs, it's important to contact your healthcare provider right away. Your provider will do a medical exam and a hearing exam as soon as possible. This is to help find the cause and type of your sudden hearing loss. Based on your diagnosis, your healthcare provider will discuss possible treatments.      Hearing much better with one ear can be a sign of hearing loss.     Have your hearing checked  Call your healthcare provider if you:   Have to strain to hear normal conversation  Have to watch other people s faces very carefully to follow what they re saying  Need to ask people to repeat what they ve said  Often misunderstand what people are saying  Turn the volume of the  television or radio up so high that others complain  Feel that people are mumbling when they re talking to you  Find that the effort to hear leaves you feeling tired and irritated  Notice, when using the phone, that you hear better with one ear than the other  Get last reviewed this educational content on 6/1/2022 2000-2022 [employer]. All rights reserved. This information is not intended as a substitute for professional medical care. Always follow your healthcare professional's instructions.
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
## 2023-08-17 ENCOUNTER — OFFICE VISIT (OUTPATIENT)
Dept: FAMILY MEDICINE | Facility: CLINIC | Age: 68
End: 2023-08-17
Payer: MEDICARE

## 2023-08-17 ENCOUNTER — ANCILLARY PROCEDURE (OUTPATIENT)
Dept: GENERAL RADIOLOGY | Facility: CLINIC | Age: 68
End: 2023-08-17
Attending: PHYSICIAN ASSISTANT
Payer: MEDICARE

## 2023-08-17 VITALS
SYSTOLIC BLOOD PRESSURE: 113 MMHG | TEMPERATURE: 96.9 F | WEIGHT: 229 LBS | BODY MASS INDEX: 31.02 KG/M2 | OXYGEN SATURATION: 98 % | DIASTOLIC BLOOD PRESSURE: 72 MMHG | HEIGHT: 72 IN | HEART RATE: 63 BPM | RESPIRATION RATE: 20 BRPM

## 2023-08-17 DIAGNOSIS — M54.16 LUMBAR RADICULOPATHY: ICD-10-CM

## 2023-08-17 DIAGNOSIS — M54.16 LUMBAR RADICULOPATHY: Primary | ICD-10-CM

## 2023-08-17 PROCEDURE — 99214 OFFICE O/P EST MOD 30 MIN: CPT | Performed by: PHYSICIAN ASSISTANT

## 2023-08-17 PROCEDURE — 72100 X-RAY EXAM L-S SPINE 2/3 VWS: CPT | Mod: TC | Performed by: INTERNAL MEDICINE

## 2023-08-17 RX ORDER — PREDNISONE 20 MG/1
20 TABLET ORAL 2 TIMES DAILY
Qty: 10 TABLET | Refills: 0 | Status: SHIPPED | OUTPATIENT
Start: 2023-08-17 | End: 2024-06-25

## 2023-08-17 RX ORDER — TRAMADOL HYDROCHLORIDE 50 MG/1
50 TABLET ORAL EVERY 6 HOURS PRN
Qty: 10 TABLET | Refills: 0 | Status: SHIPPED | OUTPATIENT
Start: 2023-08-17 | End: 2023-08-20

## 2023-08-17 ASSESSMENT — ASTHMA QUESTIONNAIRES: ACT_TOTALSCORE: 25

## 2023-08-17 ASSESSMENT — PAIN SCALES - GENERAL: PAINLEVEL: SEVERE PAIN (7)

## 2023-08-17 NOTE — Clinical Note
Please abstract the following data from this visit with this patient into the appropriate field in Epic:  Tests that can be patient reported without a hard copy:  Eye exam with ophthalmology on this date:  06/01/2023 at [facility]
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
## 2023-08-22 ENCOUNTER — MYC MEDICAL ADVICE (OUTPATIENT)
Dept: FAMILY MEDICINE | Facility: CLINIC | Age: 68
End: 2023-08-22
Payer: MEDICARE

## 2023-08-23 ENCOUNTER — TRANSFERRED RECORDS (OUTPATIENT)
Dept: HEALTH INFORMATION MANAGEMENT | Facility: CLINIC | Age: 68
End: 2023-08-23
Payer: MEDICARE

## 2023-08-29 ENCOUNTER — TRANSFERRED RECORDS (OUTPATIENT)
Dept: HEALTH INFORMATION MANAGEMENT | Facility: CLINIC | Age: 68
End: 2023-08-29
Payer: MEDICARE

## 2023-08-30 ENCOUNTER — TRANSFERRED RECORDS (OUTPATIENT)
Dept: HEALTH INFORMATION MANAGEMENT | Facility: CLINIC | Age: 68
End: 2023-08-30
Payer: MEDICARE

## 2023-10-04 ENCOUNTER — LAB (OUTPATIENT)
Dept: LAB | Facility: CLINIC | Age: 68
End: 2023-10-04
Payer: MEDICARE

## 2023-10-04 DIAGNOSIS — C92.10 CHRONIC MYELOID LEUKEMIA, BCR/ABL-POSITIVE, NOT HAVING ACHIEVED REMISSION (H): ICD-10-CM

## 2023-10-04 LAB
ALBUMIN SERPL BCG-MCNC: 4 G/DL (ref 3.5–5.2)
ALP SERPL-CCNC: 89 U/L (ref 40–129)
ALT SERPL W P-5'-P-CCNC: 27 U/L (ref 0–70)
ANION GAP SERPL CALCULATED.3IONS-SCNC: 12 MMOL/L (ref 7–15)
AST SERPL W P-5'-P-CCNC: 33 U/L (ref 0–45)
BASO+EOS+MONOS # BLD AUTO: ABNORMAL 10*3/UL
BASO+EOS+MONOS NFR BLD AUTO: ABNORMAL %
BASOPHILS # BLD AUTO: 0 10E3/UL (ref 0–0.2)
BASOPHILS NFR BLD AUTO: 0 %
BILIRUB SERPL-MCNC: 0.9 MG/DL
BUN SERPL-MCNC: 14.3 MG/DL (ref 8–23)
CALCIUM SERPL-MCNC: 8.5 MG/DL (ref 8.8–10.2)
CHLORIDE SERPL-SCNC: 108 MMOL/L (ref 98–107)
CREAT SERPL-MCNC: 1.59 MG/DL (ref 0.67–1.17)
DEPRECATED HCO3 PLAS-SCNC: 21 MMOL/L (ref 22–29)
EGFRCR SERPLBLD CKD-EPI 2021: 46 ML/MIN/1.73M2
EOSINOPHIL # BLD AUTO: 0.3 10E3/UL (ref 0–0.7)
EOSINOPHIL NFR BLD AUTO: 4 %
ERYTHROCYTE [DISTWIDTH] IN BLOOD BY AUTOMATED COUNT: 13.4 % (ref 10–15)
GLUCOSE SERPL-MCNC: 193 MG/DL (ref 70–99)
HCT VFR BLD AUTO: 35 % (ref 40–53)
HGB BLD-MCNC: 11.5 G/DL (ref 13.3–17.7)
IMM GRANULOCYTES # BLD: 0 10E3/UL
IMM GRANULOCYTES NFR BLD: 0 %
LAB DIRECTOR COMMENTS: NORMAL
LAB DIRECTOR COMMENTS: NORMAL
LAB DIRECTOR DISCLAIMER: NORMAL
LAB DIRECTOR DISCLAIMER: NORMAL
LAB DIRECTOR INTERPRETATION: NORMAL
LAB DIRECTOR INTERPRETATION: NORMAL
LAB DIRECTOR METHODOLOGY: NORMAL
LAB DIRECTOR METHODOLOGY: NORMAL
LAB DIRECTOR RESULTS: NORMAL
LAB DIRECTOR RESULTS: NORMAL
LYMPHOCYTES # BLD AUTO: 2.6 10E3/UL (ref 0.8–5.3)
LYMPHOCYTES NFR BLD AUTO: 33 %
MCH RBC QN AUTO: 33.9 PG (ref 26.5–33)
MCHC RBC AUTO-ENTMCNC: 32.9 G/DL (ref 31.5–36.5)
MCV RBC AUTO: 103 FL (ref 78–100)
MONOCYTES # BLD AUTO: 0.6 10E3/UL (ref 0–1.3)
MONOCYTES NFR BLD AUTO: 7 %
NEUTROPHILS # BLD AUTO: 4.3 10E3/UL (ref 1.6–8.3)
NEUTROPHILS NFR BLD AUTO: 56 %
NRBC # BLD AUTO: 0 10E3/UL
NRBC BLD AUTO-RTO: 0 /100
PLATELET # BLD AUTO: 140 10E3/UL (ref 150–450)
POTASSIUM SERPL-SCNC: 3.5 MMOL/L (ref 3.4–5.3)
PROT SERPL-MCNC: 5.9 G/DL (ref 6.4–8.3)
RBC # BLD AUTO: 3.39 10E6/UL (ref 4.4–5.9)
SODIUM SERPL-SCNC: 141 MMOL/L (ref 135–145)
SPECIMEN DESCRIPTION: NORMAL
SPECIMEN DESCRIPTION: NORMAL
WBC # BLD AUTO: 7.7 10E3/UL (ref 4–11)

## 2023-10-04 PROCEDURE — 36415 COLL VENOUS BLD VENIPUNCTURE: CPT

## 2023-10-04 PROCEDURE — G0452 MOLECULAR PATHOLOGY INTERPR: HCPCS | Mod: XU | Performed by: STUDENT IN AN ORGANIZED HEALTH CARE EDUCATION/TRAINING PROGRAM

## 2023-10-04 PROCEDURE — 81206 BCR/ABL1 GENE MAJOR BP: CPT

## 2023-10-04 PROCEDURE — 85025 COMPLETE CBC W/AUTO DIFF WBC: CPT

## 2023-10-04 PROCEDURE — 81207 BCR/ABL1 GENE MINOR BP: CPT

## 2023-10-04 PROCEDURE — G0452 MOLECULAR PATHOLOGY INTERPR: HCPCS | Mod: XP | Performed by: PATHOLOGY

## 2023-10-04 PROCEDURE — 80053 COMPREHEN METABOLIC PANEL: CPT
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
## 2023-10-12 ENCOUNTER — ONCOLOGY VISIT (OUTPATIENT)
Dept: ONCOLOGY | Facility: CLINIC | Age: 68
End: 2023-10-12
Attending: INTERNAL MEDICINE
Payer: MEDICARE

## 2023-10-12 VITALS
DIASTOLIC BLOOD PRESSURE: 83 MMHG | OXYGEN SATURATION: 99 % | HEART RATE: 84 BPM | WEIGHT: 232.4 LBS | SYSTOLIC BLOOD PRESSURE: 132 MMHG | BODY MASS INDEX: 31.52 KG/M2 | TEMPERATURE: 97.7 F

## 2023-10-12 DIAGNOSIS — C92.10 CHRONIC MYELOID LEUKEMIA, BCR/ABL-POSITIVE, NOT HAVING ACHIEVED REMISSION (H): Primary | ICD-10-CM

## 2023-10-12 PROCEDURE — G0463 HOSPITAL OUTPT CLINIC VISIT: HCPCS | Performed by: INTERNAL MEDICINE

## 2023-10-12 PROCEDURE — 99214 OFFICE O/P EST MOD 30 MIN: CPT | Performed by: INTERNAL MEDICINE

## 2023-10-12 ASSESSMENT — PAIN SCALES - GENERAL: PAINLEVEL: NO PAIN (0)
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
## 2023-10-12 NOTE — NURSING NOTE
Oncology Rooming Note    October 12, 2023 12:31 PM   Mau Gómez is a 68 year old male who presents for:    Chief Complaint   Patient presents with    Oncology Clinic Visit     Chronic myeloid leukemia      Initial Vitals: BP (!) 150/88 (BP Location: Right arm, Patient Position: Sitting, Cuff Size: Adult Large)   Pulse 84   Wt 105.4 kg (232 lb 6.4 oz)   SpO2 99%   BMI 31.52 kg/m   Estimated body mass index is 31.52 kg/m  as calculated from the following:    Height as of 8/17/23: 1.829 m (6').    Weight as of this encounter: 105.4 kg (232 lb 6.4 oz). Body surface area is 2.31 meters squared.  No Pain (0) Comment: Data Unavailable   No LMP for male patient.  Allergies reviewed: Yes  Medications reviewed: Yes    Medications: Medication refills not needed today.  Pharmacy name entered into EPIC:    [id number] - [place] [address] AT [facility] [address]  [facility] [place] - [address] AT PORTAL TO REGISTERED [address]  [email or web address][id number] - [facility] [address] AT [facility] [address]  [facility] D[facility] - [place] - [address]  [email or web address][id number] - [place], [place] - [address] AT [facility] OF  [address]    Clinical concerns: none      Nancy Temple

## 2023-11-05 DIAGNOSIS — C92.10 CHRONIC MYELOID LEUKEMIA, BCR/ABL-POSITIVE, NOT HAVING ACHIEVED REMISSION (H): Primary | ICD-10-CM

## 2023-11-10 DIAGNOSIS — C92.10 CHRONIC MYELOID LEUKEMIA, BCR/ABL-POSITIVE, NOT HAVING ACHIEVED REMISSION (H): Primary | ICD-10-CM

## 2023-11-10 RX ORDER — IMATINIB MESYLATE 100 MG/1
200 TABLET, FILM COATED ORAL DAILY
Qty: 180 TABLET | Refills: 4 | Status: SHIPPED | OUTPATIENT
Start: 2023-11-10

## 2023-11-10 RX ORDER — IMATINIB MESYLATE 400 MG/1
400 TABLET, FILM COATED ORAL DAILY
Qty: 90 TABLET | Refills: 4 | Status: SHIPPED | OUTPATIENT
Start: 2023-11-10

## 2023-11-29 ENCOUNTER — TELEPHONE (OUTPATIENT)
Dept: ONCOLOGY | Facility: CLINIC | Age: 68
End: 2023-11-29
Payer: MEDICARE

## 2023-11-29 NOTE — TELEPHONE ENCOUNTER
PA Initiation    Medication: IMATINIB MESYLATE 400 MG PO TABS  Insurance Company: [employer] - Phone [phone] Fax [phone]  Pharmacy Filling the Rx:    Filling Pharmacy Phone:    Filling Pharmacy Fax:    Start Date: 11/29/2023

## 2023-11-29 NOTE — TELEPHONE ENCOUNTER
Prior Authorization Approval    Medication: IMATINIB MESYLATE 400 MG PO TABS  Authorization Effective Date: 1/1/2023  Authorization Expiration Date: 11/28/2024  Approved Dose/Quantity: 30/30 ds  Reference #: Key: [id number]   Insurance Company: [employer] - Phone [phone] Fax [phone]  Expected CoPay: $    CoPay Card Available:      Financial Assistance Needed: yes, gets free drug through [facility]   Which Pharmacy is filling the prescription:    Pharmacy Notified: no  Patient Notified: yes

## 2023-11-29 NOTE — TELEPHONE ENCOUNTER
FREE DRUG APPLICATION INITIATED    Medication: IMATINIB MESYLATE 400 MG PO TABS  Free Drug Program Name:  [facility], [facility]  Date Submitted: 11/29/2023  8:04 AM  Phone #: [phone]  Fax #: [phone]  Additional Information: emailed to Brianna 11/29

## 2023-12-15 DIAGNOSIS — E78.5 HYPERLIPIDEMIA LDL GOAL <100: ICD-10-CM

## 2023-12-15 RX ORDER — ATORVASTATIN CALCIUM 40 MG/1
40 TABLET, FILM COATED ORAL DAILY
Qty: 90 TABLET | Refills: 0 | Status: SHIPPED | OUTPATIENT
Start: 2023-12-15 | End: 2024-03-12

## 2023-12-15 NOTE — TELEPHONE ENCOUNTER
Pharmacy in [place] (for winter)    Pended for 6 months  (#90 R1)    Appointments in Next Year      May 23, 2024  9:00 AM  LAB with LAB FIRST FLOOR [facility] ([facility]) [phone]     May 30, 2024 12:00 PM  (Arrive by 11:45 AM)  Return Patient with Aramis Reed MD  [facility] ([facility] ) [phone]              Asia Vee RT (R)

## 2023-12-22 NOTE — TELEPHONE ENCOUNTER
TAMERA Hopkins : POi is blurry - need to refax    Shelia Gomez CPhT  [facility]  Oncology Pharmacy Liaison II  [email or web address]  [phone] (phone  [phone] (fax

## 2023-12-29 NOTE — TELEPHONE ENCOUNTER
FREE DRUG APPLICATION APPROVED    Medication: IMATINIB MESYLATE 400 MG PO TABS  Program Name:  [facility]  Effective Date: 1/1/2024  Expiration Date: 12/31/2024  Pharmacy Filling the Rx:    Patient Notified: Yes  Additional Information: N/A

## 2024-01-14 ENCOUNTER — HEALTH MAINTENANCE LETTER (OUTPATIENT)
Age: 69
End: 2024-01-14

## 2024-03-12 DIAGNOSIS — E78.5 HYPERLIPIDEMIA LDL GOAL <100: ICD-10-CM

## 2024-03-12 RX ORDER — ATORVASTATIN CALCIUM 40 MG/1
40 TABLET, FILM COATED ORAL DAILY
Qty: 90 TABLET | Refills: 0 | Status: SHIPPED | OUTPATIENT
Start: 2024-03-12 | End: 2024-06-10

## 2024-03-12 NOTE — TELEPHONE ENCOUNTER
Prescription approved per [facility] Refill Protocol.  Deborah Taylor RN  [facility]

## 2024-05-23 ENCOUNTER — PATIENT OUTREACH (OUTPATIENT)
Dept: CARE COORDINATION | Facility: CLINIC | Age: 69
End: 2024-05-23

## 2024-06-06 ENCOUNTER — PATIENT OUTREACH (OUTPATIENT)
Dept: CARE COORDINATION | Facility: CLINIC | Age: 69
End: 2024-06-06
Payer: MEDICARE

## 2024-06-07 ENCOUNTER — TRANSFERRED RECORDS (OUTPATIENT)
Dept: MULTI SPECIALTY CLINIC | Facility: CLINIC | Age: 69
End: 2024-06-07
Payer: MEDICARE

## 2024-06-07 LAB — RETINOPATHY: NORMAL

## 2024-06-10 ENCOUNTER — LAB (OUTPATIENT)
Dept: LAB | Facility: CLINIC | Age: 69
End: 2024-06-10
Payer: MEDICARE

## 2024-06-10 DIAGNOSIS — C92.10 CHRONIC MYELOID LEUKEMIA, BCR/ABL-POSITIVE, NOT HAVING ACHIEVED REMISSION (H): ICD-10-CM

## 2024-06-10 DIAGNOSIS — E78.5 HYPERLIPIDEMIA LDL GOAL <100: ICD-10-CM

## 2024-06-10 LAB
ALBUMIN SERPL BCG-MCNC: 4.3 G/DL (ref 3.5–5.2)
ALP SERPL-CCNC: 98 U/L (ref 40–150)
ALT SERPL W P-5'-P-CCNC: 32 U/L (ref 0–70)
ANION GAP SERPL CALCULATED.3IONS-SCNC: 9 MMOL/L (ref 7–15)
AST SERPL W P-5'-P-CCNC: 34 U/L (ref 0–45)
BASOPHILS # BLD AUTO: 0 10E3/UL (ref 0–0.2)
BASOPHILS NFR BLD AUTO: 0 %
BILIRUB SERPL-MCNC: 0.8 MG/DL
BUN SERPL-MCNC: 15.6 MG/DL (ref 8–23)
CALCIUM SERPL-MCNC: 9 MG/DL (ref 8.8–10.2)
CHLORIDE SERPL-SCNC: 108 MMOL/L (ref 98–107)
CREAT SERPL-MCNC: 1.66 MG/DL (ref 0.67–1.17)
DEPRECATED HCO3 PLAS-SCNC: 26 MMOL/L (ref 22–29)
EGFRCR SERPLBLD CKD-EPI 2021: 44 ML/MIN/1.73M2
EOSINOPHIL # BLD AUTO: 0.4 10E3/UL (ref 0–0.7)
EOSINOPHIL NFR BLD AUTO: 5 %
ERYTHROCYTE [DISTWIDTH] IN BLOOD BY AUTOMATED COUNT: 13.5 % (ref 10–15)
GLUCOSE SERPL-MCNC: 215 MG/DL (ref 70–99)
HCT VFR BLD AUTO: 36.3 % (ref 40–53)
HGB BLD-MCNC: 12 G/DL (ref 13.3–17.7)
IMM GRANULOCYTES # BLD: 0 10E3/UL
IMM GRANULOCYTES NFR BLD: 0 %
LAB DIRECTOR COMMENTS: NORMAL
LAB DIRECTOR DISCLAIMER: NORMAL
LAB DIRECTOR INTERPRETATION: NORMAL
LAB DIRECTOR METHODOLOGY: NORMAL
LAB DIRECTOR RESULTS: NORMAL
LYMPHOCYTES # BLD AUTO: 2.6 10E3/UL (ref 0.8–5.3)
LYMPHOCYTES NFR BLD AUTO: 37 %
MAGNESIUM SERPL-MCNC: 1.7 MG/DL (ref 1.7–2.3)
MCH RBC QN AUTO: 34.5 PG (ref 26.5–33)
MCHC RBC AUTO-ENTMCNC: 33.1 G/DL (ref 31.5–36.5)
MCV RBC AUTO: 104 FL (ref 78–100)
MONOCYTES # BLD AUTO: 0.5 10E3/UL (ref 0–1.3)
MONOCYTES NFR BLD AUTO: 7 %
NEUTROPHILS # BLD AUTO: 3.6 10E3/UL (ref 1.6–8.3)
NEUTROPHILS NFR BLD AUTO: 51 %
NRBC # BLD AUTO: 0 10E3/UL
NRBC BLD AUTO-RTO: 0 /100
PHOSPHATE SERPL-MCNC: 2.4 MG/DL (ref 2.5–4.5)
PLATELET # BLD AUTO: 134 10E3/UL (ref 150–450)
POTASSIUM SERPL-SCNC: 4 MMOL/L (ref 3.4–5.3)
PROT SERPL-MCNC: 6.4 G/DL (ref 6.4–8.3)
RBC # BLD AUTO: 3.48 10E6/UL (ref 4.4–5.9)
SODIUM SERPL-SCNC: 143 MMOL/L (ref 135–145)
SPECIMEN DESCRIPTION: NORMAL
WBC # BLD AUTO: 7.1 10E3/UL (ref 4–11)

## 2024-06-10 PROCEDURE — 84100 ASSAY OF PHOSPHORUS: CPT

## 2024-06-10 PROCEDURE — 85025 COMPLETE CBC W/AUTO DIFF WBC: CPT

## 2024-06-10 PROCEDURE — 81206 BCR/ABL1 GENE MAJOR BP: CPT

## 2024-06-10 PROCEDURE — 36415 COLL VENOUS BLD VENIPUNCTURE: CPT

## 2024-06-10 PROCEDURE — 83735 ASSAY OF MAGNESIUM: CPT

## 2024-06-10 PROCEDURE — G0452 MOLECULAR PATHOLOGY INTERPR: HCPCS | Mod: XU | Performed by: PATHOLOGY

## 2024-06-10 PROCEDURE — 80053 COMPREHEN METABOLIC PANEL: CPT

## 2024-06-11 RX ORDER — ATORVASTATIN CALCIUM 40 MG/1
40 TABLET, FILM COATED ORAL DAILY
Qty: 90 TABLET | Refills: 0 | Status: SHIPPED | OUTPATIENT
Start: 2024-06-11 | End: 2024-09-09
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
## 2024-06-13 ENCOUNTER — ONCOLOGY VISIT (OUTPATIENT)
Dept: ONCOLOGY | Facility: CLINIC | Age: 69
End: 2024-06-13
Attending: INTERNAL MEDICINE
Payer: MEDICARE

## 2024-06-13 ENCOUNTER — ANCILLARY PROCEDURE (OUTPATIENT)
Dept: GENERAL RADIOLOGY | Facility: CLINIC | Age: 69
End: 2024-06-13
Attending: INTERNAL MEDICINE
Payer: MEDICARE

## 2024-06-13 VITALS
OXYGEN SATURATION: 99 % | WEIGHT: 241.8 LBS | BODY MASS INDEX: 32.79 KG/M2 | RESPIRATION RATE: 12 BRPM | HEART RATE: 84 BPM | TEMPERATURE: 98 F | SYSTOLIC BLOOD PRESSURE: 151 MMHG | DIASTOLIC BLOOD PRESSURE: 88 MMHG

## 2024-06-13 DIAGNOSIS — C92.10 CML (CHRONIC MYELOCYTIC LEUKEMIA) (H): ICD-10-CM

## 2024-06-13 DIAGNOSIS — R07.1 CHEST PAIN ON BREATHING: ICD-10-CM

## 2024-06-13 DIAGNOSIS — R07.1 CHEST PAIN ON BREATHING: Primary | ICD-10-CM

## 2024-06-13 PROCEDURE — 99214 OFFICE O/P EST MOD 30 MIN: CPT | Performed by: INTERNAL MEDICINE

## 2024-06-13 PROCEDURE — 71046 X-RAY EXAM CHEST 2 VIEWS: CPT | Performed by: RADIOLOGY

## 2024-06-13 PROCEDURE — G0463 HOSPITAL OUTPT CLINIC VISIT: HCPCS | Performed by: INTERNAL MEDICINE

## 2024-06-13 RX ORDER — TRAMADOL HYDROCHLORIDE 50 MG/1
50 TABLET ORAL EVERY 6 HOURS PRN
COMMUNITY
Start: 2023-08-29 | End: 2024-06-25

## 2024-06-13 ASSESSMENT — PAIN SCALES - GENERAL: PAINLEVEL: SEVERE PAIN (7)
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
## 2024-06-13 NOTE — NURSING NOTE
Oncology Rooming Note    June 13, 2024 3:21 PM   Mau Gómez is a 69 year old male who presents for:    Chief Complaint   Patient presents with    Oncology Clinic Visit     Prostate cancer      Initial Vitals: BP (!) 162/96 (BP Location: Right arm, Patient Position: Sitting, Cuff Size: Adult Large)   Pulse 84   Temp 98  F (36.7  C) (Oral)   Resp 12   Wt 109.7 kg (241 lb 12.8 oz)   SpO2 99%   BMI 32.79 kg/m   Estimated body mass index is 32.79 kg/m  as calculated from the following:    Height as of 8/17/23: 1.829 m (6').    Weight as of this encounter: 109.7 kg (241 lb 12.8 oz). Body surface area is 2.36 meters squared.  Severe Pain (7) Comment: Data Unavailable   No LMP for male patient.  Allergies reviewed: Yes  Medications reviewed: Yes    Medications: Medication refills not needed today.  Pharmacy name entered into [employer].:    [id number] - [place] [address] AT [facility] & [facility] [address]  [facility] [place] - [address] AT PORTAL TO REGISTERED [facility] [address]  [facility][id number] - [facility] [address] AT [facility] [address]  [facility] - [place] - [address]  [facility][id number] - [place], [place] - [address] AT [facility] OF  & [address]    Frailty Screening:   Is the patient here for a new oncology consult visit in cancer care? 2. No      Clinical concerns:  none    Nancy Temple

## 2024-06-14 ENCOUNTER — MYC MEDICAL ADVICE (OUTPATIENT)
Dept: FAMILY MEDICINE | Facility: CLINIC | Age: 69
End: 2024-06-14
Payer: MEDICARE

## 2024-06-20 SDOH — HEALTH STABILITY: PHYSICAL HEALTH: ON AVERAGE, HOW MANY MINUTES DO YOU ENGAGE IN EXERCISE AT THIS LEVEL?: 20 MIN

## 2024-06-20 SDOH — HEALTH STABILITY: PHYSICAL HEALTH: ON AVERAGE, HOW MANY DAYS PER WEEK DO YOU ENGAGE IN MODERATE TO STRENUOUS EXERCISE (LIKE A BRISK WALK)?: 4 DAYS

## 2024-06-20 ASSESSMENT — ASTHMA QUESTIONNAIRES
ACT_TOTALSCORE: 24
QUESTION_1 LAST FOUR WEEKS HOW MUCH OF THE TIME DID YOUR ASTHMA KEEP YOU FROM GETTING AS MUCH DONE AT WORK, SCHOOL OR AT HOME: NONE OF THE TIME
QUESTION_5 LAST FOUR WEEKS HOW WOULD YOU RATE YOUR ASTHMA CONTROL: WELL CONTROLLED
QUESTION_4 LAST FOUR WEEKS HOW OFTEN HAVE YOU USED YOUR RESCUE INHALER OR NEBULIZER MEDICATION (SUCH AS ALBUTEROL): NOT AT ALL
QUESTION_3 LAST FOUR WEEKS HOW OFTEN DID YOUR ASTHMA SYMPTOMS (WHEEZING, COUGHING, SHORTNESS OF BREATH, CHEST TIGHTNESS OR PAIN) WAKE YOU UP AT NIGHT OR EARLIER THAN USUAL IN THE MORNING: NOT AT ALL
QUESTION_2 LAST FOUR WEEKS HOW OFTEN HAVE YOU HAD SHORTNESS OF BREATH: NOT AT ALL
ACT_TOTALSCORE: 24

## 2024-06-20 ASSESSMENT — SOCIAL DETERMINANTS OF HEALTH (SDOH): HOW OFTEN DO YOU GET TOGETHER WITH FRIENDS OR RELATIVES?: THREE TIMES A WEEK

## 2024-06-25 ENCOUNTER — OFFICE VISIT (OUTPATIENT)
Dept: FAMILY MEDICINE | Facility: CLINIC | Age: 69
End: 2024-06-25
Payer: MEDICARE

## 2024-06-25 VITALS
OXYGEN SATURATION: 98 % | HEART RATE: 82 BPM | DIASTOLIC BLOOD PRESSURE: 86 MMHG | WEIGHT: 240 LBS | RESPIRATION RATE: 16 BRPM | TEMPERATURE: 97.5 F | BODY MASS INDEX: 32.51 KG/M2 | HEIGHT: 72 IN | SYSTOLIC BLOOD PRESSURE: 126 MMHG

## 2024-06-25 DIAGNOSIS — Z12.5 ENCOUNTER FOR SCREENING FOR MALIGNANT NEOPLASM OF PROSTATE: ICD-10-CM

## 2024-06-25 DIAGNOSIS — E78.5 HYPERLIPIDEMIA LDL GOAL <100: ICD-10-CM

## 2024-06-25 DIAGNOSIS — D69.6 THROMBOCYTOPENIA (H): ICD-10-CM

## 2024-06-25 DIAGNOSIS — R05.3 CHRONIC COUGH: ICD-10-CM

## 2024-06-25 DIAGNOSIS — I48.91 ATRIAL FIBRILLATION, UNSPECIFIED TYPE (H): ICD-10-CM

## 2024-06-25 DIAGNOSIS — N18.31 STAGE 3A CHRONIC KIDNEY DISEASE (H): ICD-10-CM

## 2024-06-25 DIAGNOSIS — Z87.891 FORMER SMOKER: ICD-10-CM

## 2024-06-25 DIAGNOSIS — I10 BENIGN ESSENTIAL HYPERTENSION: ICD-10-CM

## 2024-06-25 DIAGNOSIS — C92.10 CHRONIC MYELOID LEUKEMIA, BCR/ABL-POSITIVE, NOT HAVING ACHIEVED REMISSION (H): ICD-10-CM

## 2024-06-25 DIAGNOSIS — R07.89 CHEST WALL PAIN: ICD-10-CM

## 2024-06-25 DIAGNOSIS — E66.9 NON MORBID OBESITY: ICD-10-CM

## 2024-06-25 DIAGNOSIS — K63.5 POLYP OF COLON, UNSPECIFIED PART OF COLON, UNSPECIFIED TYPE: ICD-10-CM

## 2024-06-25 DIAGNOSIS — R93.89 ABNORMAL CHEST X-RAY: ICD-10-CM

## 2024-06-25 DIAGNOSIS — Z00.00 ENCOUNTER FOR MEDICARE ANNUAL WELLNESS EXAM: Primary | ICD-10-CM

## 2024-06-25 DIAGNOSIS — J45.20 INTERMITTENT ASTHMA, UNCOMPLICATED: ICD-10-CM

## 2024-06-25 DIAGNOSIS — E11.22 TYPE 2 DIABETES MELLITUS WITH CHRONIC KIDNEY DISEASE, WITHOUT LONG-TERM CURRENT USE OF INSULIN, UNSPECIFIED CKD STAGE (H): ICD-10-CM

## 2024-06-25 LAB
CHOLEST SERPL-MCNC: 100 MG/DL
CREAT UR-MCNC: 222 MG/DL
FASTING STATUS PATIENT QL REPORTED: YES
HBA1C MFR BLD: 6.4 % (ref 0–5.6)
HDLC SERPL-MCNC: 36 MG/DL
LDLC SERPL CALC-MCNC: 49 MG/DL
MICROALBUMIN UR-MCNC: 87.3 MG/L
MICROALBUMIN/CREAT UR: 39.32 MG/G CR (ref 0–17)
NONHDLC SERPL-MCNC: 64 MG/DL
PSA SERPL DL<=0.01 NG/ML-MCNC: 4.42 NG/ML (ref 0–6.5)
TRIGL SERPL-MCNC: 76 MG/DL

## 2024-06-25 PROCEDURE — G0439 PPPS, SUBSEQ VISIT: HCPCS | Performed by: INTERNAL MEDICINE

## 2024-06-25 PROCEDURE — 80061 LIPID PANEL: CPT | Performed by: INTERNAL MEDICINE

## 2024-06-25 PROCEDURE — 82570 ASSAY OF URINE CREATININE: CPT | Performed by: INTERNAL MEDICINE

## 2024-06-25 PROCEDURE — 36415 COLL VENOUS BLD VENIPUNCTURE: CPT | Performed by: INTERNAL MEDICINE

## 2024-06-25 PROCEDURE — 99214 OFFICE O/P EST MOD 30 MIN: CPT | Mod: 25 | Performed by: INTERNAL MEDICINE

## 2024-06-25 PROCEDURE — 83036 HEMOGLOBIN GLYCOSYLATED A1C: CPT | Performed by: INTERNAL MEDICINE

## 2024-06-25 PROCEDURE — 82043 UR ALBUMIN QUANTITATIVE: CPT | Performed by: INTERNAL MEDICINE

## 2024-06-25 PROCEDURE — G0103 PSA SCREENING: HCPCS | Performed by: INTERNAL MEDICINE

## 2024-06-25 RX ORDER — LOSARTAN POTASSIUM 50 MG/1
50 TABLET ORAL DAILY
Qty: 90 TABLET | Refills: 3 | Status: SHIPPED | OUTPATIENT
Start: 2024-06-25 | End: 2024-09-13

## 2024-06-25 ASSESSMENT — PAIN SCALES - GENERAL: PAINLEVEL: NO PAIN (0)

## 2024-06-25 NOTE — PATIENT INSTRUCTIONS
"You should get the shingrix shot now    You should get another covid shot    You should get a rsv shot in the fall    Get the abdominal ultrasound to look for an aneurysm.    Get the chest ct    See me in 4 months    Start the losartan 50mg once daily for the blood pressure  Patient Education   Preventive Care Advice   This is general advice we often give to help people stay healthy. Your care team may have specific advice just for you. Please talk to your care team about your own preventive care needs.  Lifestyle  Exercise at least 150 minutes each week (30 minutes a day, 5 days a week).  Do muscle strengthening activities 2 days a week. These help control your weight and prevent disease.  No smoking.  Wear sunscreen to prevent skin cancer.  Have your home tested for radon every 2 to 5 years. Radon is a colorless, odorless gas that can harm your lungs. To learn more, go to www.health.[facility].mn.us and search for \"Radon in Homes.\"  Keep guns unloaded and locked up in a safe place like a safe or gun vault, or, use a gun lock and hide the keys. Always lock away bullets separately. To learn more, visit [employer].mn.gov and search for \"safe gun storage.\"  Nutrition  Eat 5 or more servings of fruits and vegetables each day.  Try wheat bread, brown rice and whole grain pasta (instead of white bread, rice, and pasta).  Get enough calcium and vitamin D. Check the label on foods and aim for 100% of the RDA (recommended daily allowance).  Regular exams  Have a dental exam and cleaning every 6 months.  See your health care team every year to talk about:  Any changes in your health.  Any medicines your care team has prescribed.  Preventive care, family planning, and ways to prevent chronic diseases.  Shots (vaccines)   HPV shots (up to age 26), if you've never had them before.  Hepatitis B shots (up to age 59), if you've never had them before.  COVID-19 shot: Get this shot when it's due.  Flu shot: Get a flu shot every year.  Tetanus " shot: Get a tetanus shot every 10 years.  Pneumococcal, hepatitis A, and RSV shots: Ask your care team if you need these based on your risk.  Shingles shot (for age 50 and up).  General health tests  Diabetes screening:  Starting at age 35, Get screened for diabetes at least every 3 years.  If you are younger than age 35, ask your care team if you should be screened for diabetes.  Cholesterol test: At age 39, start having a cholesterol test every 5 years, or more often if advised.  Bone density scan (DEXA): At age 50, ask your care team if you should have this scan for osteoporosis (brittle bones).  Hepatitis C: Get tested at least once in your life.  Abdominal aortic aneurysm screening: Talk to your doctor about having this screening if you:  Have ever smoked; and  Are biologically male; and  Are between the ages of 65 and 75.  STIs (sexually transmitted infections)  Before age 24: Ask your care team if you should be screened for STIs.  After age 24: Get screened for STIs if you're at risk. You are at risk for STIs (including HIV) if:  You are sexually active with more than one person.  You don't use condoms every time.  You or a partner was diagnosed with a sexually transmitted infection.  If you are at risk for HIV, ask about PrEP medicine to prevent HIV.  Get tested for HIV at least once in your life, whether you are at risk for HIV or not.  Cancer screening tests  Cervical cancer screening: If you have a cervix, begin getting regular cervical cancer screening tests at age 21. Most people who have regular screenings with normal results can stop after age 65. Talk about this with your provider.  Breast cancer scan (mammogram): If you've ever had breasts, begin having regular mammograms starting at age 40. This is a scan to check for breast cancer.  Colon cancer screening: It is important to start screening for colon cancer at age 45.  Have a colonoscopy test every 10 years (or more often if you're at risk) Or,  ask your provider about stool tests like a FIT test every year or Cologuard test every 3 years.  To learn more about your testing options, visit: www.[employer]/625331.pdf.  For help making a decision, visit: serena/ul89705.  Prostate cancer screening test: If you have a prostate and are age 55 to 69, ask your provider if you would benefit from a yearly prostate cancer screening test.  Lung cancer screening: If you are a current or former smoker age 50 to 80, ask your care team if ongoing lung cancer screenings are right for you.  For informational purposes only. Not to replace the advice of your health care provider. Copyright   2023 [employer]. All rights reserved. Clinically reviewed by the  [employer] [place] [facility]. bepretty [id number] - REV 04/24.  Hearing Loss: Care Instructions  Overview     Hearing loss is a sudden or slow decrease in how well you hear. It can range from slight to profound. Permanent hearing loss can occur with aging. It also can happen when you are exposed long-term to loud noise. Examples include listening to loud music, riding motorcycles, or being around other loud machines.  Hearing loss can affect your work and home life. It can make you feel lonely or depressed. You may feel that you have lost your independence. But hearing aids and other devices can help you hear better and feel connected to others.  Follow-up care is a key part of your treatment and safety. Be sure to make and go to all appointments, and call your doctor if you are having problems. It's also a good idea to know your test results and keep a list of the medicines you take.  How can you care for yourself at home?  Avoid loud noises whenever possible. This helps keep your hearing from getting worse.  Always wear hearing protection around loud noises.  Wear a hearing aid as directed.  A professional can help you pick a hearing aid that will work best for you.  You can also get hearing aids over  "the counter for mild to moderate hearing loss.  Have hearing tests as your doctor suggests. They can show whether your hearing has changed. Your hearing aid may need to be adjusted.  Use other devices as needed. These may include:  Telephone amplifiers and hearing aids that can connect to a television, stereo, radio, or microphone.  Devices that use lights or vibrations. These alert you to the doorbell, a ringing telephone, or a baby monitor.  Television closed-captioning. This shows the words at the bottom of the screen. Most new TVs can do this.  TTY (text telephone). This lets you type messages back and forth on the telephone instead of talking or listening. These devices are also called TDD. When messages are typed on the keyboard, they are sent over the phone line to a receiving TTY. The message is shown on a monitor.  Use text messaging, social media, and email if it is hard for you to communicate by telephone.  Try to learn a listening technique called speechreading. It is not lipreading. You pay attention to people's gestures, expressions, posture, and tone of voice. These clues can help you understand what a person is saying. Face the person you are talking to, and have them face you. Make sure the lighting is good. You need to see the other person's face clearly.  Think about counseling if you need help to adjust to your hearing loss.  When should you call for help?  Watch closely for changes in your health, and be sure to contact your doctor if:    You think your hearing is getting worse.     You have new symptoms, such as dizziness or nausea.   Where can you learn more?  Go to https://www.[employer].net/patiented  Enter R798 in the search box to learn more about \"Hearing Loss: Care Instructions.\"  Current as of: September 27, 2023               Content Version: 14.0    8075-7310 [employer].   Care instructions adapted under license by your healthcare professional. If you have questions " about a medical condition or this instruction, always ask your healthcare professional. Healthwise, Incorporated disclaims any warranty or liability for your use of this information.
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
## 2024-06-27 NOTE — RESULT ENCOUNTER NOTE
Graham,    It was very nice seeing you.  You should be able to view your test results.    Your diabetes test or hemoglobin A1c is just a little bit higher at 6.4.  We may need to consider medications in the future.  If you are able to get your weight down it should help this.    Your PSA or prostate cancer test is lower at 4.42 which is great.  Your cholesterol is super.    You have a little bit of protein in the urine.  I am hopeful that the losartan will help.  There are other medications we can use for this as well but it is not a concern.    Please be sure to see me in October.  If you have questions let me know.    uW

## 2024-07-02 ENCOUNTER — ANCILLARY PROCEDURE (OUTPATIENT)
Dept: ULTRASOUND IMAGING | Facility: CLINIC | Age: 69
End: 2024-07-02
Attending: INTERNAL MEDICINE
Payer: MEDICARE

## 2024-07-02 DIAGNOSIS — Z87.891 FORMER SMOKER: ICD-10-CM

## 2024-07-02 PROBLEM — I77.89 ECTASIA OF ARTERY (H): Status: ACTIVE | Noted: 2024-06-01

## 2024-07-02 PROCEDURE — 76706 US ABDL AORTA SCREEN AAA: CPT

## 2024-07-02 NOTE — RESULT ENCOUNTER NOTE
Graham,    Your ultrasound shows a very slight enlargement of your abdominal aorta.  Normal is up to 2.5 cm in years is 3.1 so it is not significantly enlarged but it something we will want to follow.  Please never smoke, and we should do a follow-up ultrasound in 3 years but I do not believe there is any significant risk at this point.    Your iliac arteries are also just barely enlarged at 1.6 cm with the normal being up to 1.5 so we will keep an eye on this as well.    If you have questions let me know.    Wu

## 2024-07-20 DIAGNOSIS — R35.0 URINARY FREQUENCY: ICD-10-CM

## 2024-07-22 RX ORDER — ALFUZOSIN HYDROCHLORIDE 10 MG/1
10 TABLET, EXTENDED RELEASE ORAL DAILY
Qty: 90 TABLET | Refills: 3 | Status: SHIPPED | OUTPATIENT
Start: 2024-07-22 | End: 2024-09-13

## 2024-08-21 ENCOUNTER — ANCILLARY PROCEDURE (OUTPATIENT)
Dept: CT IMAGING | Facility: CLINIC | Age: 69
End: 2024-08-21
Attending: INTERNAL MEDICINE
Payer: MEDICARE

## 2024-08-21 DIAGNOSIS — R93.89 ABNORMAL CHEST X-RAY: ICD-10-CM

## 2024-08-21 LAB
CREAT BLD-MCNC: 1.8 MG/DL (ref 0.7–1.3)
EGFRCR SERPLBLD CKD-EPI 2021: 40 ML/MIN/1.73M2

## 2024-08-21 PROCEDURE — 250N000011 HC RX IP 250 OP 636: Performed by: INTERNAL MEDICINE

## 2024-08-21 PROCEDURE — 82565 ASSAY OF CREATININE: CPT

## 2024-08-21 PROCEDURE — 250N000009 HC RX 250: Performed by: INTERNAL MEDICINE

## 2024-08-21 PROCEDURE — 71260 CT THORAX DX C+: CPT | Mod: MG

## 2024-08-21 RX ORDER — IOPAMIDOL 755 MG/ML
81 INJECTION, SOLUTION INTRAVASCULAR ONCE
Status: COMPLETED | OUTPATIENT
Start: 2024-08-21 | End: 2024-08-21

## 2024-08-21 RX ADMIN — IOPAMIDOL 81 ML: 755 INJECTION, SOLUTION INTRAVENOUS at 08:37

## 2024-08-21 RX ADMIN — SODIUM CHLORIDE 72 ML: 9 INJECTION, SOLUTION INTRAVENOUS at 08:38

## 2024-08-21 NOTE — RESULT ENCOUNTER NOTE
Please advise patient to schedule follow-up with PCP, I am covering for his primary doctor, I reviewed CT scan of his chest That showed stable small pulmonary nodules, but there is a 5.1 cm hypoattenuating mass seen in the right hepatic/liver lobe was seen on prior CT in 2012, could reflect a hemangioma which is a vascular growth, radiologist is recommending further imaging, repeat follow-up MRI exam.  There could be concern with bleeding from such lesions.  I would advise patient schedule a follow-up visit with his primary doctor Dr Jorge to discuss further [could be virtual visit].  Dr Hinton

## 2024-08-23 NOTE — RESULT ENCOUNTER NOTE
Graham,    Your creatinine or kidney test is just slightly higher this time.  I am not concerned but to be safe I would asked that you repeat this in a week or 2.  You do not need to see me or fast for this but [facility]

## 2024-08-28 DIAGNOSIS — N18.31 STAGE 3A CHRONIC KIDNEY DISEASE (H): Primary | ICD-10-CM

## 2024-09-06 ENCOUNTER — LAB (OUTPATIENT)
Dept: LAB | Facility: CLINIC | Age: 69
End: 2024-09-06
Payer: MEDICARE

## 2024-09-06 DIAGNOSIS — N18.31 STAGE 3A CHRONIC KIDNEY DISEASE (H): ICD-10-CM

## 2024-09-06 LAB
ALBUMIN UR-MCNC: 30 MG/DL
APPEARANCE UR: CLEAR
BACTERIA #/AREA URNS HPF: ABNORMAL /HPF
BILIRUB UR QL STRIP: NEGATIVE
COLOR UR AUTO: YELLOW
CREAT SERPL-MCNC: 1.82 MG/DL (ref 0.67–1.17)
EGFRCR SERPLBLD CKD-EPI 2021: 39 ML/MIN/1.73M2
GLUCOSE UR STRIP-MCNC: 500 MG/DL
HGB UR QL STRIP: NEGATIVE
KETONES UR STRIP-MCNC: NEGATIVE MG/DL
LEUKOCYTE ESTERASE UR QL STRIP: NEGATIVE
MUCOUS THREADS #/AREA URNS LPF: PRESENT /LPF
NITRATE UR QL: NEGATIVE
PH UR STRIP: 5.5 [PH] (ref 5–7)
RBC #/AREA URNS AUTO: ABNORMAL /HPF
SP GR UR STRIP: 1.02 (ref 1–1.03)
UROBILINOGEN UR STRIP-ACNC: 0.2 E.U./DL
WBC #/AREA URNS AUTO: ABNORMAL /HPF

## 2024-09-06 PROCEDURE — 82565 ASSAY OF CREATININE: CPT

## 2024-09-06 PROCEDURE — 81001 URINALYSIS AUTO W/SCOPE: CPT

## 2024-09-06 PROCEDURE — 36415 COLL VENOUS BLD VENIPUNCTURE: CPT

## 2024-09-08 DIAGNOSIS — E78.5 HYPERLIPIDEMIA LDL GOAL <100: ICD-10-CM

## 2024-09-09 RX ORDER — ATORVASTATIN CALCIUM 40 MG/1
40 TABLET, FILM COATED ORAL DAILY
Qty: 90 TABLET | Refills: 0 | Status: SHIPPED | OUTPATIENT
Start: 2024-09-09 | End: 2024-09-13

## 2024-09-13 ENCOUNTER — VIRTUAL VISIT (OUTPATIENT)
Dept: FAMILY MEDICINE | Facility: CLINIC | Age: 69
End: 2024-09-13
Payer: MEDICARE

## 2024-09-13 DIAGNOSIS — E11.22 TYPE 2 DIABETES MELLITUS WITH CHRONIC KIDNEY DISEASE, WITHOUT LONG-TERM CURRENT USE OF INSULIN, UNSPECIFIED CKD STAGE (H): ICD-10-CM

## 2024-09-13 DIAGNOSIS — N18.31 STAGE 3A CHRONIC KIDNEY DISEASE (H): ICD-10-CM

## 2024-09-13 DIAGNOSIS — E78.5 HYPERLIPIDEMIA LDL GOAL <100: ICD-10-CM

## 2024-09-13 DIAGNOSIS — I10 BENIGN ESSENTIAL HYPERTENSION: ICD-10-CM

## 2024-09-13 DIAGNOSIS — R35.0 URINARY FREQUENCY: ICD-10-CM

## 2024-09-13 DIAGNOSIS — I77.89 ECTASIA OF ARTERY (H): Primary | ICD-10-CM

## 2024-09-13 DIAGNOSIS — R80.9 PROTEINURIA, UNSPECIFIED TYPE: ICD-10-CM

## 2024-09-13 PROCEDURE — 99442 PR PHYSICIAN TELEPHONE EVALUATION 11-20 MIN: CPT | Mod: 93 | Performed by: INTERNAL MEDICINE

## 2024-09-13 RX ORDER — ATORVASTATIN CALCIUM 40 MG/1
40 TABLET, FILM COATED ORAL DAILY
Qty: 90 TABLET | Refills: 0 | OUTPATIENT
Start: 2024-09-13

## 2024-09-13 RX ORDER — ATORVASTATIN CALCIUM 40 MG/1
40 TABLET, FILM COATED ORAL DAILY
Qty: 90 TABLET | Refills: 0 | Status: SHIPPED | OUTPATIENT
Start: 2024-09-13

## 2024-09-13 RX ORDER — LOSARTAN POTASSIUM 50 MG/1
50 TABLET ORAL DAILY
Qty: 90 TABLET | Refills: 3 | Status: SHIPPED | OUTPATIENT
Start: 2024-09-13

## 2024-09-13 RX ORDER — ALFUZOSIN HYDROCHLORIDE 10 MG/1
10 TABLET, EXTENDED RELEASE ORAL DAILY
Qty: 90 TABLET | Refills: 3 | Status: SHIPPED | OUTPATIENT
Start: 2024-09-13

## 2024-09-13 NOTE — PROGRESS NOTES
Graham is a 69 year old who is being evaluated via a billable telephone visit.    What phone number would you like to be contacted at? [phone]  How would you like to obtain your AVS? Donaldharfelisha  Originating Location (pt. Location): Home    Distant Location (provider location):  On-site    This is a telephone visit for this 69-year-old.  As noted and reviewed I last saw him on June 25.  His physical but had a lot of issues to go over.    The patient has diabetes for which he has not been on medication.  At the June 25 visit his A1c was noted to be 6.4.    The patient has CKD.  His urinary protein was 39.32 up slightly from 1 year ago.  His creatinine done recently was higher at 1.827 days ago.  His urine analysis showed glucose and protein and was otherwise negative.  At the June 25 visit I added losartan due to the proteinuria.    During the June 25 visit I noted an abnormal chest x-ray on June 13 which was done by oncology due to some chest pain.  Because of this a CT chest was done on August 21 which showed stable pulmonary nodules compared with 2015.  He also had a 5.1 cm hypoattenuating mass in the right hepatic lobe.  It is felt likely a hemangioma but we have been trying to get comparison for this.      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    During that visit we discussed the GLP-1 agent but he was hesitant.    He has a cough, in [place] and humid he does not cough.  He has had it for years.  He is not having chest pain or shortness of breath, no fever or ns.    He is doing quite well and feels well.  He has had no side effects from the losartan.    I discussed with him Jardiance as well as a GLP-1 agent.  Given the potential cost of the GLP-1 agent we will use Jardiance for his proteinuria and  diabetes.  Side effects were discussed.  I will send that in and he will do a follow-up creatinine approximately 2 to 4 weeks later.  He will work on exercise and diet and weight loss.  He will come in for a follow-up office visit in 6 months as he is going to [place] for the winter.    Wu Jorge M.D.  18 minutes on the day of the encounter doing chart review, history and exam, documentation and further activities as noted above.

## 2024-09-16 ENCOUNTER — MYC MEDICAL ADVICE (OUTPATIENT)
Dept: FAMILY MEDICINE | Facility: CLINIC | Age: 69
End: 2024-09-16
Payer: MEDICARE

## 2024-09-16 DIAGNOSIS — N18.31 STAGE 3A CHRONIC KIDNEY DISEASE (H): ICD-10-CM

## 2024-09-16 DIAGNOSIS — E11.22 TYPE 2 DIABETES MELLITUS WITH CHRONIC KIDNEY DISEASE, WITHOUT LONG-TERM CURRENT USE OF INSULIN, UNSPECIFIED CKD STAGE (H): ICD-10-CM
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
## 2024-10-10 ENCOUNTER — LAB (OUTPATIENT)
Dept: LAB | Facility: CLINIC | Age: 69
End: 2024-10-10
Attending: INTERNAL MEDICINE
Payer: MEDICARE

## 2024-10-10 ENCOUNTER — ONCOLOGY VISIT (OUTPATIENT)
Dept: ONCOLOGY | Facility: CLINIC | Age: 69
End: 2024-10-10
Attending: INTERNAL MEDICINE
Payer: MEDICARE

## 2024-10-10 VITALS
TEMPERATURE: 98 F | WEIGHT: 238.6 LBS | BODY MASS INDEX: 32.36 KG/M2 | HEART RATE: 85 BPM | DIASTOLIC BLOOD PRESSURE: 90 MMHG | OXYGEN SATURATION: 98 % | RESPIRATION RATE: 18 BRPM | SYSTOLIC BLOOD PRESSURE: 142 MMHG

## 2024-10-10 DIAGNOSIS — R07.1 CHEST PAIN ON BREATHING: ICD-10-CM

## 2024-10-10 DIAGNOSIS — C92.10 CHRONIC MYELOID LEUKEMIA, BCR/ABL-POSITIVE, NOT HAVING ACHIEVED REMISSION (H): Primary | ICD-10-CM

## 2024-10-10 DIAGNOSIS — C92.10 CML (CHRONIC MYELOCYTIC LEUKEMIA) (H): ICD-10-CM

## 2024-10-10 LAB
ALBUMIN SERPL BCG-MCNC: 4.1 G/DL (ref 3.5–5.2)
ALP SERPL-CCNC: 84 U/L (ref 40–150)
ALT SERPL W P-5'-P-CCNC: 38 U/L (ref 0–70)
ANION GAP SERPL CALCULATED.3IONS-SCNC: 7 MMOL/L (ref 7–15)
AST SERPL W P-5'-P-CCNC: 37 U/L (ref 0–45)
BASOPHILS # BLD AUTO: 0 10E3/UL (ref 0–0.2)
BASOPHILS NFR BLD AUTO: 0 %
BILIRUB SERPL-MCNC: 0.8 MG/DL
BUN SERPL-MCNC: 20.4 MG/DL (ref 8–23)
CALCIUM SERPL-MCNC: 8.7 MG/DL (ref 8.8–10.4)
CHLORIDE SERPL-SCNC: 111 MMOL/L (ref 98–107)
CREAT SERPL-MCNC: 1.71 MG/DL (ref 0.67–1.17)
EGFRCR SERPLBLD CKD-EPI 2021: 42 ML/MIN/1.73M2
EOSINOPHIL # BLD AUTO: 0.4 10E3/UL (ref 0–0.7)
EOSINOPHIL NFR BLD AUTO: 5 %
ERYTHROCYTE [DISTWIDTH] IN BLOOD BY AUTOMATED COUNT: 13.6 % (ref 10–15)
GLUCOSE SERPL-MCNC: 139 MG/DL (ref 70–99)
HCO3 SERPL-SCNC: 24 MMOL/L (ref 22–29)
HCT VFR BLD AUTO: 34.8 % (ref 40–53)
HGB BLD-MCNC: 11.2 G/DL (ref 13.3–17.7)
IMM GRANULOCYTES # BLD: 0 10E3/UL
IMM GRANULOCYTES NFR BLD: 0 %
LAB DIRECTOR COMMENTS: NORMAL
LAB DIRECTOR DISCLAIMER: NORMAL
LAB DIRECTOR INTERPRETATION: NORMAL
LAB DIRECTOR METHODOLOGY: NORMAL
LAB DIRECTOR RESULTS: NORMAL
LYMPHOCYTES # BLD AUTO: 2.7 10E3/UL (ref 0.8–5.3)
LYMPHOCYTES NFR BLD AUTO: 34 %
MCH RBC QN AUTO: 34 PG (ref 26.5–33)
MCHC RBC AUTO-ENTMCNC: 32.2 G/DL (ref 31.5–36.5)
MCV RBC AUTO: 106 FL (ref 78–100)
MONOCYTES # BLD AUTO: 0.6 10E3/UL (ref 0–1.3)
MONOCYTES NFR BLD AUTO: 8 %
NEUTROPHILS # BLD AUTO: 4.2 10E3/UL (ref 1.6–8.3)
NEUTROPHILS NFR BLD AUTO: 54 %
PLATELET # BLD AUTO: 156 10E3/UL (ref 150–450)
POTASSIUM SERPL-SCNC: 4.3 MMOL/L (ref 3.4–5.3)
PROT SERPL-MCNC: 6 G/DL (ref 6.4–8.3)
RBC # BLD AUTO: 3.29 10E6/UL (ref 4.4–5.9)
SODIUM SERPL-SCNC: 142 MMOL/L (ref 135–145)
SPECIMEN DESCRIPTION: NORMAL
WBC # BLD AUTO: 7.9 10E3/UL (ref 4–11)

## 2024-10-10 PROCEDURE — 36415 COLL VENOUS BLD VENIPUNCTURE: CPT

## 2024-10-10 PROCEDURE — 80053 COMPREHEN METABOLIC PANEL: CPT

## 2024-10-10 PROCEDURE — G0463 HOSPITAL OUTPT CLINIC VISIT: HCPCS | Performed by: INTERNAL MEDICINE

## 2024-10-10 PROCEDURE — G2211 COMPLEX E/M VISIT ADD ON: HCPCS | Performed by: INTERNAL MEDICINE

## 2024-10-10 PROCEDURE — 99214 OFFICE O/P EST MOD 30 MIN: CPT | Performed by: INTERNAL MEDICINE

## 2024-10-10 PROCEDURE — 85025 COMPLETE CBC W/AUTO DIFF WBC: CPT

## 2024-10-10 PROCEDURE — 81206 BCR/ABL1 GENE MAJOR BP: CPT

## 2024-10-10 PROCEDURE — G0452 MOLECULAR PATHOLOGY INTERPR: HCPCS | Performed by: PATHOLOGY

## 2024-10-10 ASSESSMENT — PAIN SCALES - GENERAL: PAINLEVEL: NO PAIN (0)
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
## 2024-10-10 NOTE — NURSING NOTE
Oncology Rooming Note    October 10, 2024 1:28 PM   Mau Gómez is a 69 year old male who presents for:    Chief Complaint   Patient presents with    Oncology Clinic Visit     RTN for Prostate Cancer     Initial Vitals: BP (!) 142/90 (BP Location: Right arm, Patient Position: Right side, Cuff Size: Adult Large)   Pulse 85   Temp 98  F (36.7  C) (Oral)   Resp 18   Wt 108.2 kg (238 lb 9.6 oz)   SpO2 98%   BMI 32.36 kg/m   Estimated body mass index is 32.36 kg/m  as calculated from the following:    Height as of 6/25/24: 1.829 m (6').    Weight as of this encounter: 108.2 kg (238 lb 9.6 oz). Body surface area is 2.34 meters squared.  No Pain (0) Comment: Data Unavailable   No LMP for male patient.  Allergies reviewed: Yes  Medications reviewed: Yes    Medications: Medication refills not needed today.  Pharmacy name entered into [facility]:    [facility][id number] - [address] AT [facility] & [facility] [address]  [id number] - [facility] [address] [facility] [address] [facility] [address]  [facility] # [id number] - [facility] [address] [place] [address]  [facility] # [id number] - [facility] [address] [facility]    Frailty Screening:   Is the patient here for a new oncology consult visit in cancer care? 2. No      Clinical concerns: none       Lauren Rios MA

## 2024-11-14 ENCOUNTER — TELEPHONE (OUTPATIENT)
Dept: ONCOLOGY | Facility: CLINIC | Age: 69
End: 2024-11-14
Payer: MEDICARE

## 2024-11-14 NOTE — TELEPHONE ENCOUNTER
PA Initiation    Medication: IMATINIB MESYLATE 400 MG PO TABS  Insurance Company: [employer] - Phone [phone] Fax [phone]  Pharmacy Filling the Rx:    Filling Pharmacy Phone:    Filling Pharmacy Fax:    Start Date: 11/14/2024

## 2024-11-14 NOTE — TELEPHONE ENCOUNTER
Prior Authorization Approval    Medication: IMATINIB MESYLATE 400 MG PO TABS  Authorization Effective Date: 11/14/2024  Authorization Expiration Date: 11/14/2025  Approved Dose/Quantity: 45/30ds  Reference #: Key: [id number]   Insurance Company: [employer] - Phone [phone] Fax [phone]  Expected CoPay: $    CoPay Card Available:      Financial Assistance Needed:   Which Pharmacy is filling the prescription:    Pharmacy Notified:   Patient Notified:

## 2024-12-01 DIAGNOSIS — C92.10 CHRONIC MYELOID LEUKEMIA, BCR/ABL-POSITIVE, NOT HAVING ACHIEVED REMISSION (H): Primary | ICD-10-CM

## 2024-12-09 DIAGNOSIS — C92.10 CHRONIC MYELOID LEUKEMIA, BCR/ABL-POSITIVE, NOT HAVING ACHIEVED REMISSION (H): Primary | ICD-10-CM

## 2024-12-09 RX ORDER — IMATINIB MESYLATE 400 MG/1
400 TABLET, FILM COATED ORAL DAILY
Qty: 90 TABLET | Refills: 4 | Status: SHIPPED | OUTPATIENT
Start: 2024-12-09

## 2024-12-09 RX ORDER — IMATINIB MESYLATE 100 MG/1
200 TABLET, FILM COATED ORAL DAILY
Qty: 180 TABLET | Refills: 4 | Status: SHIPPED | OUTPATIENT
Start: 2024-12-09

## 2025-01-09 ENCOUNTER — PATIENT OUTREACH (OUTPATIENT)
Dept: ONCOLOGY | Facility: CLINIC | Age: 70
End: 2025-01-09
Payer: MEDICARE

## 2025-01-09 NOTE — PROGRESS NOTES
[facility]: Cancer Care                                                                                        Completed chart audit to assign Oncology Care Coordination enrollment status.        Maryse GUTIÉRREZN, RN, OCN  Care Coordinator  [facility]

## 2025-01-26 ENCOUNTER — HEALTH MAINTENANCE LETTER (OUTPATIENT)
Age: 70
End: 2025-01-26

## 2025-02-15 NOTE — ADDENDUM NOTE
Addended by: THEODORA STANLEY on: 7/15/2021 10:24 AM     Modules accepted: Andrew Olivas     EMERGENCY DEPARTMENT ENCOUNTER      NAME: Paul Esposito  AGE: 71 year old male  YOB: 1953  MRN: [id number]  EVALUATION DATE & TIME: 2/15/2025  8:24 AM    PCP: System, Provider Not In    ED PROVIDER: Richa Perry MD    Chief Complaint   Patient presents with    Urinary Retention         FINAL IMPRESSION:  1. Bladder stones          ED COURSE & MEDICAL DECISION MAKING:    Pertinent Labs & Imaging studies reviewed. (See chart for details)  71 year old male with history of bladder stone, ureterolithiasis who presents to the Emergency Department for evaluation of gross hematuria for the last several days seen at clinic and started on Bactrim for possible UTI though the culture did not grow anything.  Today he had a sense that he was not able to urinate and prompted ED visit.  He has since urinated, postvoid residual is 78.  With his negative culture, suspect that this might be more related to stone within the urinary system ureteral versus bladder.  Differential includes mass.  He is not retaining urine here.  Sounds like the blood he had in the urine yesterday was watermelon juice and type color and my concern that he is obstructed from clot is quite low.    Patient placed on monitor, IV established and blood obtained.  CBC, BMP unremarkable.  Urinalysis shows calcium oxalate crystals, gross hematuria but also some white cells.  Similar to his urinalysis from 3 days ago.  CT urogram very large bladder stones.  Case discussed with urology and will make expedited outpatient follow-up for operative management.  Urine culture was negative so can discontinue previously prescribed Bactrim.      ED Course as of 02/15/25 1150   Sat Feb 15, 2025   0832 I met with the patient and obtained initial history.   0910 UA with Microscopic reflex to Culture(!)  Similar to 3d ago   1006 CT Urogram wo & w Contrast  CT independently interpreted by myself with 2 large bladder stones   1131 Spoke w Dr. Carlos Eduardo Duenas  Decision Making  Obtained supplemental history:Supplemental history obtained?: Family Member/Significant Other  Reviewed external records: External records reviewed?: Outpatient Record: 2/12/2025 clinic note  Care impacted by chronic illness:Documented in Chart  Did you consider but not order tests?: Work up considered but not performed and documented in chart, if applicable  Did you interpret images independently?: Independent interpretation of ECG and images noted in documentation, when applicable.  Consultation discussion with other provider:Did you involve another provider (consultant, , pharmacy, etc.)?: I discussed the care with another health care provider, see documentation for details.  Discharge. I recommended discontinuing prescription strength medication(s) as charted. See documentation for any additional details.    MIPS: Not Applicable      At the conclusion of the encounter I discussed the results of all of the tests and the disposition. The questions were answered. The patient or family acknowledged understanding and was agreeable with the care plan.      MEDICATIONS GIVEN IN THE EMERGENCY:  Medications   iopamidol (ISOVUE-370) solution 90 mL (90 mLs Intravenous $Given 2/15/25 0939)       NEW PRESCRIPTIONS STARTED AT TODAY'S ER VISIT  New Prescriptions    No medications on file          =================================================================    HPI    Patient information was obtained from: Patient    Use of Intrepreter: N/A        Paul Esposito is a 71 year old male with pertinent medical history of bladder stone, hernia, who presents with urinary retention.    Patient states he had hematuria and was seen and treated for a bladder infection. Patient states his urine culture came back negative and the hematuria had resolved but returned yesterday. Patient states it was very light in color. Patient states he was having difficulty urinating. Of note, patient has a history of kidney and  bladder stones.    Patient satinder not endorse pain or any other concerns at this time.     Per chart review:  Patient was seen at [facility] on 02/12/2025 for hematuria. Patient had labs done. Patient was discharged with antibiotics pending urine culture in stable condition. Urine culture resulted negative and patient was notified.      PAST MEDICAL HISTORY:  No past medical history on file.    PAST SURGICAL HISTORY:  Past Surgical History:   Procedure Laterality Date    CYSTOSCOPY W/ LITHOLAPAXY / EHL N/A 5/8/2019    Procedure: CYSTOLITHOLAPAXY WITH HOLMIUM LASER, BLADDER STONE EXTRACTION, WESTFALL PLACEMENT;  Surgeon: Moises Martini MD;  Location: [facility];  Service: Urology    INGUINAL HERNIA REPAIR Right 5/21/2019    Procedure: RIGHT INGUINAL HERNIA REPAIR;  Surgeon: Anil Andres MD;  Location: [facility];  Service: General    KIDNEY STONE SURGERY      VASECTOMY  1982       CURRENT MEDICATIONS:    Prior to Admission Medications   Prescriptions Last Dose Informant Patient Reported? Taking?   moxifloxacin (VIGAMOX) 0.5 % ophthalmic solution   Yes No   Sig: INSTILL 1 DROP EVERY 2 HOURS IN THE LEFT EYE UNTIL FOLLOW UP, THEN AS DIRECTED   Patient not taking: Reported on 2/12/2025   sulfamethoxazole-trimethoprim (BACTRIM DS) 800-160 MG tablet   No No   Sig: Take 1 tablet by mouth 2 times daily for 7 days.   tobramycin-dexAMETHasone (TOBRADEX) 0.3-0.1 % ophthalmic suspension   Yes No   Sig: INSTILL ONE DROP TO THE LEFT EYE SIX TIMES A DAY FOR ONE WEEK   Patient not taking: Reported on 2/12/2025   traMADol (ULTRAM) 50 mg tablet   No No   Sig: [TRAMADOL (ULTRAM) 50 MG TABLET] Take 1-2 tablets ( mg total) by mouth every 6 (six) hours as needed for pain.   Patient not taking: Reported on 2/12/2025      Facility-Administered Medications: None       ALLERGIES:  No Known Allergies    FAMILY HISTORY:  Family History   Problem Relation Age of Onset    Cerebrovascular Disease Mother      Heart Disease Mother     Cerebrovascular Disease Father        SOCIAL HISTORY:  Social History     Tobacco Use    Smoking status: Former     Current packs/day: 0.00     Types: Cigarettes     Quit date: 2003     Years since quittin.1     Passive exposure: Past    Smokeless tobacco: Never   Vaping Use    Vaping status: Never Used   Substance Use Topics    Alcohol use: No    Drug use: No        VITALS:  Patient Vitals for the past 24 hrs:   BP Temp Temp src Pulse Resp SpO2   02/15/25 1141 (!) 141/82 -- -- -- -- --   02/15/25 0900 129/65 -- -- 89 -- 95 %   02/15/25 0844 136/75 -- -- 94 -- 96 %   02/15/25 0828 129/81 97.1  F (36.2  C) Temporal 113 18 94 %       PHYSICAL EXAM    General Appearance: Well-appearing, well-nourished, no acute distress   Head:  Normocephalic  Cardio: Initially tachycardic normalized on recheck  Pulm:  No respiratory distress  Back:  No CVA tenderness, normal ROM  Abdomen:  Soft, non-tender, non distended,no rebound or guarding.  Extremities: Normal gait  Skin:  Skin warm, dry, no rashes  Neuro:  Alert and oriented ×3     RADIOLOGY/LABS:  Reviewed all pertinent imaging. Please see official radiology report. All pertinent labs reviewed and interpreted.    Results for orders placed or performed during the hospital encounter of 02/15/25   CT Urogram wo & w Contrast    Impression    IMPRESSION:  1.  Two large urinary bladder stones with marked urinary bladder wall thickening. Correlate with urinalysis.  2.  Nonobstructing left nephrolithiasis.  3.  No suspicious upper tract filling defects.  4.  Mild wall thickening of the descending and rectosigmoid colon, possibly in part due to underdistention. Correlate clinically for signs/symptoms of colitis.  5.  Prostatomegaly.     Basic metabolic panel   Result Value Ref Range    Sodium 135 135 - 145 mmol/L    Potassium 4.1 3.4 - 5.3 mmol/L    Chloride 102 98 - 107 mmol/L    Carbon Dioxide (CO2) 22 22 - 29 mmol/L    Anion Gap 11 7 - 15 mmol/L     Urea Nitrogen 12.1 8.0 - 23.0 mg/dL    Creatinine 1.16 0.67 - 1.17 mg/dL    GFR Estimate 67 >60 mL/min/1.73m2    Calcium 10.6 (H) 8.8 - 10.4 mg/dL    Glucose 101 (H) 70 - 99 mg/dL   UA with Microscopic reflex to Culture    Specimen: Urine, Clean Catch   Result Value Ref Range    Color Urine Yellow Colorless, Straw, Light Yellow, Yellow    Appearance Urine Turbid (A) Clear    Glucose Urine Negative Negative mg/dL    Bilirubin Urine Negative Negative    Ketones Urine 10 (A) Negative mg/dL    Specific Gravity Urine 1.014 1.001 - 1.030    Blood Urine >1.0 mg/dL (A) Negative    pH Urine 6.5 5.0 - 7.0    Protein Albumin Urine 100 (A) Negative mg/dL    Urobilinogen Urine <2.0 <2.0 mg/dL    Nitrite Urine Negative Negative    Leukocyte Esterase Urine Negative Negative    Mucus Urine Present (A) None Seen /LPF    Calcium Oxalate Crystals Urine Few (A) None Seen /HPF    RBC Urine >182 (H) <=2 /HPF    WBC Urine 67 (H) <=5 /HPF   CBC with platelets and differential   Result Value Ref Range    WBC Count 7.8 4.0 - 11.0 10e3/uL    RBC Count 5.04 4.40 - 5.90 10e6/uL    Hemoglobin 16.0 13.3 - 17.7 g/dL    Hematocrit 44.0 40.0 - 53.0 %    MCV 87 78 - 100 fL    MCH 31.7 26.5 - 33.0 pg    MCHC 36.4 31.5 - 36.5 g/dL    RDW 13.4 10.0 - 15.0 %    Platelet Count 263 150 - 450 10e3/uL    % Neutrophils 62 %    % Lymphocytes 28 %    % Monocytes 7 %    % Eosinophils 2 %    % Basophils 1 %    % Immature Granulocytes 0 %    NRBCs per 100 WBC 0 <1 /100    Absolute Neutrophils 4.9 1.6 - 8.3 10e3/uL    Absolute Lymphocytes 2.2 0.8 - 5.3 10e3/uL    Absolute Monocytes 0.6 0.0 - 1.3 10e3/uL    Absolute Eosinophils 0.2 0.0 - 0.7 10e3/uL    Absolute Basophils 0.1 0.0 - 0.2 10e3/uL    Absolute Immature Granulocytes 0.0 <=0.4 10e3/uL    Absolute NRBCs 0.0 10e3/uL           The creation of this record is based on the scribe s observations of the work being performed by Richa Perry MD and the provider s statements to them. It was created on her behalf  by Delio Dacosta, a trained medical scribe. This document has been checked and approved by the attending provider.    Richa Perry MD  Emergency Medicine  [facility] EMERGENCY ROOM  [address] [address] [phone]  Dept: [phone]     Richa Perry MD  02/15/25 1158

## 2025-02-23 DIAGNOSIS — C92.10 CHRONIC MYELOID LEUKEMIA, BCR/ABL-POSITIVE, NOT HAVING ACHIEVED REMISSION (H): Primary | ICD-10-CM

## 2025-03-17 DIAGNOSIS — E78.5 HYPERLIPIDEMIA LDL GOAL <100: ICD-10-CM

## 2025-03-17 RX ORDER — ATORVASTATIN CALCIUM 40 MG/1
40 TABLET, FILM COATED ORAL DAILY
Qty: 90 TABLET | Refills: 0 | Status: SHIPPED | OUTPATIENT
Start: 2025-03-17

## 2025-03-23 ENCOUNTER — HEALTH MAINTENANCE LETTER (OUTPATIENT)
Age: 70
End: 2025-03-23

## 2025-05-08 ENCOUNTER — LAB (OUTPATIENT)
Dept: LAB | Facility: CLINIC | Age: 70
End: 2025-05-08
Payer: MEDICARE

## 2025-05-08 DIAGNOSIS — C92.10 CHRONIC MYELOID LEUKEMIA, BCR/ABL-POSITIVE, NOT HAVING ACHIEVED REMISSION (H): ICD-10-CM

## 2025-05-08 LAB
ALBUMIN SERPL BCG-MCNC: 4.2 G/DL (ref 3.5–5.2)
ALP SERPL-CCNC: 86 U/L (ref 40–150)
ALT SERPL W P-5'-P-CCNC: 33 U/L (ref 0–70)
ANION GAP SERPL CALCULATED.3IONS-SCNC: 8 MMOL/L (ref 7–15)
AST SERPL W P-5'-P-CCNC: 42 U/L (ref 0–45)
BASOPHILS # BLD AUTO: 0 10E3/UL (ref 0–0.2)
BASOPHILS NFR BLD AUTO: 0 %
BILIRUB SERPL-MCNC: 0.8 MG/DL
BUN SERPL-MCNC: 23.5 MG/DL (ref 8–23)
CALCIUM SERPL-MCNC: 8.9 MG/DL (ref 8.8–10.4)
CHLORIDE SERPL-SCNC: 109 MMOL/L (ref 98–107)
CREAT SERPL-MCNC: 1.91 MG/DL (ref 0.67–1.17)
EGFRCR SERPLBLD CKD-EPI 2021: 37 ML/MIN/1.73M2
EOSINOPHIL # BLD AUTO: 0.3 10E3/UL (ref 0–0.7)
EOSINOPHIL NFR BLD AUTO: 4 %
ERYTHROCYTE [DISTWIDTH] IN BLOOD BY AUTOMATED COUNT: 13.3 % (ref 10–15)
GLUCOSE SERPL-MCNC: 150 MG/DL (ref 70–99)
HCO3 SERPL-SCNC: 24 MMOL/L (ref 22–29)
HCT VFR BLD AUTO: 33.6 % (ref 40–53)
HGB BLD-MCNC: 10.8 G/DL (ref 13.3–17.7)
IMM GRANULOCYTES # BLD: 0 10E3/UL
IMM GRANULOCYTES NFR BLD: 0 %
LYMPHOCYTES # BLD AUTO: 2.1 10E3/UL (ref 0.8–5.3)
LYMPHOCYTES NFR BLD AUTO: 30 %
MAGNESIUM SERPL-MCNC: 2.1 MG/DL (ref 1.7–2.3)
MCH RBC QN AUTO: 34.4 PG (ref 26.5–33)
MCHC RBC AUTO-ENTMCNC: 32.1 G/DL (ref 31.5–36.5)
MCV RBC AUTO: 107 FL (ref 78–100)
MONOCYTES # BLD AUTO: 0.6 10E3/UL (ref 0–1.3)
MONOCYTES NFR BLD AUTO: 8 %
NEUTROPHILS # BLD AUTO: 4 10E3/UL (ref 1.6–8.3)
NEUTROPHILS NFR BLD AUTO: 57 %
PHOSPHATE SERPL-MCNC: 2.5 MG/DL (ref 2.5–4.5)
PLATELET # BLD AUTO: 126 10E3/UL (ref 150–450)
POTASSIUM SERPL-SCNC: 4.5 MMOL/L (ref 3.4–5.3)
PROT SERPL-MCNC: 6.2 G/DL (ref 6.4–8.3)
RBC # BLD AUTO: 3.14 10E6/UL (ref 4.4–5.9)
RETICS # AUTO: 0.05 10E6/UL (ref 0.03–0.1)
RETICS/RBC NFR AUTO: 1.7 % (ref 0.5–2)
SODIUM SERPL-SCNC: 141 MMOL/L (ref 135–145)
WBC # BLD AUTO: 7 10E3/UL (ref 4–11)

## 2025-05-08 PROCEDURE — 83036 HEMOGLOBIN GLYCOSYLATED A1C: CPT | Performed by: INTERNAL MEDICINE

## 2025-05-10 ASSESSMENT — ASTHMA QUESTIONNAIRES
QUESTION_4 LAST FOUR WEEKS HOW OFTEN HAVE YOU USED YOUR RESCUE INHALER OR NEBULIZER MEDICATION (SUCH AS ALBUTEROL): NOT AT ALL
QUESTION_3 LAST FOUR WEEKS HOW OFTEN DID YOUR ASTHMA SYMPTOMS (WHEEZING, COUGHING, SHORTNESS OF BREATH, CHEST TIGHTNESS OR PAIN) WAKE YOU UP AT NIGHT OR EARLIER THAN USUAL IN THE MORNING: NOT AT ALL
QUESTION_5 LAST FOUR WEEKS HOW WOULD YOU RATE YOUR ASTHMA CONTROL: COMPLETELY CONTROLLED
ACT_TOTALSCORE: 24
QUESTION_2 LAST FOUR WEEKS HOW OFTEN HAVE YOU HAD SHORTNESS OF BREATH: ONCE OR TWICE A WEEK
QUESTION_1 LAST FOUR WEEKS HOW MUCH OF THE TIME DID YOUR ASTHMA KEEP YOU FROM GETTING AS MUCH DONE AT WORK, SCHOOL OR AT HOME: NONE OF THE TIME

## 2025-05-12 ENCOUNTER — OFFICE VISIT (OUTPATIENT)
Dept: FAMILY MEDICINE | Facility: CLINIC | Age: 70
End: 2025-05-12
Payer: MEDICARE

## 2025-05-12 ENCOUNTER — DOCUMENTATION ONLY (OUTPATIENT)
Dept: FAMILY MEDICINE | Facility: CLINIC | Age: 70
End: 2025-05-12

## 2025-05-12 VITALS
OXYGEN SATURATION: 98 % | BODY MASS INDEX: 31.97 KG/M2 | RESPIRATION RATE: 18 BRPM | WEIGHT: 236 LBS | HEIGHT: 72 IN | TEMPERATURE: 98.1 F | SYSTOLIC BLOOD PRESSURE: 135 MMHG | HEART RATE: 64 BPM | DIASTOLIC BLOOD PRESSURE: 84 MMHG

## 2025-05-12 DIAGNOSIS — I48.91 ATRIAL FIBRILLATION, UNSPECIFIED TYPE (H): ICD-10-CM

## 2025-05-12 DIAGNOSIS — R53.83 TIRED: ICD-10-CM

## 2025-05-12 DIAGNOSIS — C92.10 CHRONIC MYELOID LEUKEMIA, BCR/ABL-POSITIVE, NOT HAVING ACHIEVED REMISSION (H): ICD-10-CM

## 2025-05-12 DIAGNOSIS — J45.20 INTERMITTENT ASTHMA, UNCOMPLICATED: ICD-10-CM

## 2025-05-12 DIAGNOSIS — I10 BENIGN ESSENTIAL HYPERTENSION: ICD-10-CM

## 2025-05-12 DIAGNOSIS — N18.32 STAGE 3B CHRONIC KIDNEY DISEASE (H): ICD-10-CM

## 2025-05-12 DIAGNOSIS — E11.22 TYPE 2 DIABETES MELLITUS WITH CHRONIC KIDNEY DISEASE, WITHOUT LONG-TERM CURRENT USE OF INSULIN, UNSPECIFIED CKD STAGE (H): Primary | ICD-10-CM

## 2025-05-12 DIAGNOSIS — E78.5 HYPERLIPIDEMIA LDL GOAL <100: ICD-10-CM

## 2025-05-12 DIAGNOSIS — D69.6 THROMBOCYTOPENIA: ICD-10-CM

## 2025-05-12 LAB
CREAT UR-MCNC: 247 MG/DL
EST. AVERAGE GLUCOSE BLD GHB EST-MCNC: 126 MG/DL
HBA1C MFR BLD: 6 % (ref 0–5.6)
MICROALBUMIN UR-MCNC: 93.7 MG/L
MICROALBUMIN/CREAT UR: 37.94 MG/G CR (ref 0–17)

## 2025-05-12 PROCEDURE — 3075F SYST BP GE 130 - 139MM HG: CPT | Performed by: INTERNAL MEDICINE

## 2025-05-12 PROCEDURE — 82570 ASSAY OF URINE CREATININE: CPT | Performed by: INTERNAL MEDICINE

## 2025-05-12 PROCEDURE — 1126F AMNT PAIN NOTED NONE PRSNT: CPT | Performed by: INTERNAL MEDICINE

## 2025-05-12 PROCEDURE — 3079F DIAST BP 80-89 MM HG: CPT | Performed by: INTERNAL MEDICINE

## 2025-05-12 PROCEDURE — 82043 UR ALBUMIN QUANTITATIVE: CPT | Performed by: INTERNAL MEDICINE

## 2025-05-12 PROCEDURE — 99214 OFFICE O/P EST MOD 30 MIN: CPT | Performed by: INTERNAL MEDICINE

## 2025-05-12 PROCEDURE — G2211 COMPLEX E/M VISIT ADD ON: HCPCS | Performed by: INTERNAL MEDICINE

## 2025-05-12 ASSESSMENT — PAIN SCALES - GENERAL: PAINLEVEL_OUTOF10: NO PAIN (0)
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
## 2025-05-12 NOTE — PATIENT INSTRUCTIONS
I would get the covid and shingles shots now and the rsv shot in the fall.    Start checking your blood pressure weekly after sitting for 5 minutes and let me know if your blood pressure is not staying under 130/80.    Get the kidney ultrasound.    Wu

## 2025-05-12 NOTE — PROGRESS NOTES
The add-on test TSH WITH FREE T4 REFLEX  that was requested on 5/12/25 is unable to be completed due to sample discarded. Future order is available for collection. If labs are needed before next appointment, please arrange for collection.
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
## 2025-05-15 ENCOUNTER — ONCOLOGY VISIT (OUTPATIENT)
Dept: ONCOLOGY | Facility: CLINIC | Age: 70
End: 2025-05-15
Attending: INTERNAL MEDICINE
Payer: MEDICARE

## 2025-05-15 VITALS
DIASTOLIC BLOOD PRESSURE: 84 MMHG | BODY MASS INDEX: 31.97 KG/M2 | RESPIRATION RATE: 18 BRPM | TEMPERATURE: 98 F | HEIGHT: 72 IN | WEIGHT: 236 LBS | SYSTOLIC BLOOD PRESSURE: 149 MMHG | HEART RATE: 62 BPM | OXYGEN SATURATION: 98 %

## 2025-05-15 DIAGNOSIS — C92.10 CHRONIC MYELOID LEUKEMIA, BCR/ABL-POSITIVE, NOT HAVING ACHIEVED REMISSION (H): ICD-10-CM

## 2025-05-15 DIAGNOSIS — N18.31 ANEMIA OF CHRONIC RENAL FAILURE, STAGE 3A (H): Primary | ICD-10-CM

## 2025-05-15 DIAGNOSIS — D63.1 ANEMIA OF CHRONIC RENAL FAILURE, STAGE 3A (H): Primary | ICD-10-CM

## 2025-05-15 PROCEDURE — G0463 HOSPITAL OUTPT CLINIC VISIT: HCPCS | Performed by: INTERNAL MEDICINE

## 2025-05-15 ASSESSMENT — PAIN SCALES - GENERAL: PAINLEVEL_OUTOF10: NO PAIN (0)
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
## 2025-05-15 NOTE — NURSING NOTE
Oncology Rooming Note    May 15, 2025 2:55 PM   Mau Gómez is a 70 year old male who presents for:    Chief Complaint   Patient presents with    Oncology Clinic Visit     Prostate Cancer     Initial Vitals: BP (!) 149/84   Pulse 62   Temp 98  F (36.7  C) (Tympanic)   Resp 18   Ht 1.829 m (6')   Wt 107 kg (236 lb)   SpO2 98%   BMI 32.01 kg/m   Estimated body mass index is 32.01 kg/m  as calculated from the following:    Height as of this encounter: 1.829 m (6').    Weight as of this encounter: 107 kg (236 lb). Body surface area is 2.33 meters squared.  No Pain (0) Comment: Data Unavailable   No LMP for male patient.  Allergies reviewed: Yes  Medications reviewed: Yes    Medications: Medication refills not needed today.  Pharmacy name entered into EPIC:    [facility][id number] - [facility] [address] AT [facility] [address] [facility] & [facility] [address]  [id number] - [facility] [address] [facility] [address]  [address]  [id number] - [facility] [address] [place] [address]  [facility] # [id number] - [place], [place] - [address]  [facility] [employer] [facility] - [place], [place] - [address]    Frailty Screening:   Is the patient here for a new oncology consult visit in cancer care? 2. No    PHQ9:  Did this patient require a PHQ9?: No      Clinical concerns: none      José Miguel Bowen LPN

## 2025-05-16 ENCOUNTER — ANCILLARY PROCEDURE (OUTPATIENT)
Dept: ULTRASOUND IMAGING | Facility: CLINIC | Age: 70
End: 2025-05-16
Attending: INTERNAL MEDICINE
Payer: MEDICARE

## 2025-05-16 DIAGNOSIS — N18.32 STAGE 3B CHRONIC KIDNEY DISEASE (H): ICD-10-CM

## 2025-05-16 PROCEDURE — 76770 US EXAM ABDO BACK WALL COMP: CPT

## 2025-05-19 ENCOUNTER — RESULTS FOLLOW-UP (OUTPATIENT)
Dept: FAMILY MEDICINE | Facility: CLINIC | Age: 70
End: 2025-05-19

## 2025-05-19 NOTE — RESULT ENCOUNTER NOTE
Graham, your lipoprotein a level is elevated.  You are already taking Lipitor and your LDL level is very good so there really is nothing further to do.  If you would like to see cardiology just let me know.    Wu

## 2025-05-20 ENCOUNTER — PATIENT OUTREACH (OUTPATIENT)
Dept: CARE COORDINATION | Facility: CLINIC | Age: 70
End: 2025-05-20
Payer: MEDICARE

## 2025-05-22 ENCOUNTER — PATIENT OUTREACH (OUTPATIENT)
Dept: CARE COORDINATION | Facility: CLINIC | Age: 70
End: 2025-05-22
Payer: MEDICARE

## 2025-06-02 DIAGNOSIS — C92.10 CHRONIC MYELOID LEUKEMIA, BCR/ABL-POSITIVE, NOT HAVING ACHIEVED REMISSION (H): Primary | ICD-10-CM

## 2025-06-02 DIAGNOSIS — N18.32 STAGE 3B CHRONIC KIDNEY DISEASE (H): ICD-10-CM

## 2025-06-03 ENCOUNTER — TELEPHONE (OUTPATIENT)
Dept: NEPHROLOGY | Facility: CLINIC | Age: 70
End: 2025-06-03
Payer: MEDICARE

## 2025-06-03 ENCOUNTER — PATIENT OUTREACH (OUTPATIENT)
Dept: CARE COORDINATION | Facility: CLINIC | Age: 70
End: 2025-06-03
Payer: MEDICARE

## 2025-06-03 NOTE — TELEPHONE ENCOUNTER
Patient confirmed scheduled appointment:  Date: 7/8/25  Time: 3:45PM  Visit type: NEW NEPH ONCOLOGY  Provider: KYLER  Location: [facility]  Testing/imaging: LABS  Additional notes: N/A

## 2025-06-09 DIAGNOSIS — E78.5 HYPERLIPIDEMIA LDL GOAL <100: ICD-10-CM

## 2025-06-09 DIAGNOSIS — R06.2 WHEEZING: ICD-10-CM

## 2025-06-09 RX ORDER — ATORVASTATIN CALCIUM 40 MG/1
40 TABLET, FILM COATED ORAL
Qty: 90 TABLET | Refills: 0 | Status: SHIPPED | OUTPATIENT
Start: 2025-06-09

## 2025-06-09 RX ORDER — ALBUTEROL SULFATE 0.83 MG/ML
SOLUTION RESPIRATORY (INHALATION)
Qty: 180 ML | Refills: 0 | OUTPATIENT
Start: 2025-06-09

## 2025-06-26 DIAGNOSIS — C92.10 CHRONIC MYELOID LEUKEMIA, BCR/ABL-POSITIVE, NOT HAVING ACHIEVED REMISSION (H): Primary | ICD-10-CM

## 2025-07-07 ENCOUNTER — DOCUMENTATION ONLY (OUTPATIENT)
Dept: NEPHROLOGY | Facility: CLINIC | Age: 70
End: 2025-07-07
Payer: MEDICARE

## 2025-07-07 DIAGNOSIS — N18.32 STAGE 3B CHRONIC KIDNEY DISEASE (H): ICD-10-CM

## 2025-07-07 DIAGNOSIS — C92.10 CHRONIC MYELOID LEUKEMIA, BCR/ABL-POSITIVE, NOT HAVING ACHIEVED REMISSION (H): Primary | ICD-10-CM

## 2025-07-08 ENCOUNTER — LAB (OUTPATIENT)
Dept: LAB | Facility: CLINIC | Age: 70
End: 2025-07-08
Attending: INTERNAL MEDICINE
Payer: MEDICARE

## 2025-07-08 ENCOUNTER — OFFICE VISIT (OUTPATIENT)
Dept: ONCOLOGY | Facility: CLINIC | Age: 70
End: 2025-07-08
Attending: INTERNAL MEDICINE
Payer: MEDICARE

## 2025-07-08 VITALS
SYSTOLIC BLOOD PRESSURE: 126 MMHG | HEIGHT: 70 IN | HEART RATE: 53 BPM | TEMPERATURE: 98.2 F | RESPIRATION RATE: 16 BRPM | WEIGHT: 232 LBS | DIASTOLIC BLOOD PRESSURE: 81 MMHG | OXYGEN SATURATION: 99 % | BODY MASS INDEX: 33.21 KG/M2

## 2025-07-08 DIAGNOSIS — E55.9 VITAMIN D DEFICIENCY: ICD-10-CM

## 2025-07-08 DIAGNOSIS — N18.32 STAGE 3B CHRONIC KIDNEY DISEASE (H): ICD-10-CM

## 2025-07-08 DIAGNOSIS — C92.10 CHRONIC MYELOID LEUKEMIA, BCR/ABL-POSITIVE, NOT HAVING ACHIEVED REMISSION (H): ICD-10-CM

## 2025-07-08 DIAGNOSIS — N18.32 ANEMIA IN STAGE 3B CHRONIC KIDNEY DISEASE (H): Primary | ICD-10-CM

## 2025-07-08 DIAGNOSIS — D63.1 ANEMIA IN STAGE 3B CHRONIC KIDNEY DISEASE (H): Primary | ICD-10-CM

## 2025-07-08 DIAGNOSIS — N18.32 ANEMIA IN STAGE 3B CHRONIC KIDNEY DISEASE (H): ICD-10-CM

## 2025-07-08 DIAGNOSIS — D63.1 ANEMIA IN STAGE 3B CHRONIC KIDNEY DISEASE (H): ICD-10-CM

## 2025-07-08 DIAGNOSIS — E11.22 TYPE 2 DIABETES MELLITUS WITH CHRONIC KIDNEY DISEASE, WITHOUT LONG-TERM CURRENT USE OF INSULIN, UNSPECIFIED CKD STAGE (H): ICD-10-CM

## 2025-07-08 LAB
ALBUMIN MFR UR ELPH: 13.7 MG/DL
ALBUMIN SERPL BCG-MCNC: 4.1 G/DL (ref 3.5–5.2)
ALBUMIN UR-MCNC: NEGATIVE MG/DL
ALP SERPL-CCNC: 85 U/L (ref 40–150)
ALT SERPL W P-5'-P-CCNC: 25 U/L (ref 0–70)
ANION GAP SERPL CALCULATED.3IONS-SCNC: 10 MMOL/L (ref 7–15)
APPEARANCE UR: CLEAR
AST SERPL W P-5'-P-CCNC: 32 U/L (ref 0–45)
BILIRUB SERPL-MCNC: 0.9 MG/DL
BILIRUB UR QL STRIP: NEGATIVE
BUN SERPL-MCNC: 20.4 MG/DL (ref 8–23)
CALCIUM SERPL-MCNC: 8.9 MG/DL (ref 8.8–10.4)
CHLORIDE SERPL-SCNC: 111 MMOL/L (ref 98–107)
COLOR UR AUTO: ABNORMAL
CREAT SERPL-MCNC: 1.93 MG/DL (ref 0.67–1.17)
CREAT UR-MCNC: 100 MG/DL
CREAT UR-MCNC: 101 MG/DL
EGFRCR SERPLBLD CKD-EPI 2021: 36 ML/MIN/1.73M2
ERYTHROCYTE [DISTWIDTH] IN BLOOD BY AUTOMATED COUNT: 13.9 % (ref 10–15)
GLUCOSE SERPL-MCNC: 122 MG/DL (ref 70–99)
GLUCOSE UR STRIP-MCNC: >=1000 MG/DL
HCO3 SERPL-SCNC: 24 MMOL/L (ref 22–29)
HCT VFR BLD AUTO: 33.1 % (ref 40–53)
HGB BLD-MCNC: 10.5 G/DL (ref 13.3–17.7)
HGB UR QL STRIP: NEGATIVE
IRON BINDING CAPACITY (ROCHE): 232 UG/DL (ref 240–430)
IRON SATN MFR SERPL: 26 % (ref 15–46)
IRON SERPL-MCNC: 60 UG/DL (ref 61–157)
KETONES UR STRIP-MCNC: NEGATIVE MG/DL
LEUKOCYTE ESTERASE UR QL STRIP: NEGATIVE
MCH RBC QN AUTO: 33.9 PG (ref 26.5–33)
MCHC RBC AUTO-ENTMCNC: 31.7 G/DL (ref 31.5–36.5)
MCV RBC AUTO: 107 FL (ref 78–100)
MICROALBUMIN UR-MCNC: 38 MG/L
MICROALBUMIN/CREAT UR: 38 MG/G CR (ref 0–17)
NITRATE UR QL: NEGATIVE
PH UR STRIP: 6 [PH] (ref 5–7)
PHOSPHATE SERPL-MCNC: 2.3 MG/DL (ref 2.5–4.5)
PLATELET # BLD AUTO: 138 10E3/UL (ref 150–450)
POTASSIUM SERPL-SCNC: 3.7 MMOL/L (ref 3.4–5.3)
PROT SERPL-MCNC: 5.9 G/DL (ref 6.4–8.3)
PROT/CREAT 24H UR: 0.14 MG/MG CR (ref 0–0.2)
RBC # BLD AUTO: 3.1 10E6/UL (ref 4.4–5.9)
RBC URINE: 2 /HPF
SODIUM SERPL-SCNC: 145 MMOL/L (ref 135–145)
SP GR UR STRIP: 1.03 (ref 1–1.03)
UROBILINOGEN UR STRIP-MCNC: NORMAL MG/DL
VIT D+METAB SERPL-MCNC: 22 NG/ML (ref 20–50)
WBC # BLD AUTO: 7.5 10E3/UL (ref 4–11)
WBC URINE: 1 /HPF

## 2025-07-08 PROCEDURE — G0463 HOSPITAL OUTPT CLINIC VISIT: HCPCS | Performed by: INTERNAL MEDICINE

## 2025-07-08 PROCEDURE — 83540 ASSAY OF IRON: CPT | Performed by: PATHOLOGY

## 2025-07-08 PROCEDURE — 80053 COMPREHEN METABOLIC PANEL: CPT | Performed by: PATHOLOGY

## 2025-07-08 PROCEDURE — 81001 URINALYSIS AUTO W/SCOPE: CPT | Performed by: PATHOLOGY

## 2025-07-08 PROCEDURE — 36415 COLL VENOUS BLD VENIPUNCTURE: CPT | Performed by: PATHOLOGY

## 2025-07-08 PROCEDURE — 99000 SPECIMEN HANDLING OFFICE-LAB: CPT | Performed by: PATHOLOGY

## 2025-07-08 PROCEDURE — 84100 ASSAY OF PHOSPHORUS: CPT | Performed by: PATHOLOGY

## 2025-07-08 PROCEDURE — 3079F DIAST BP 80-89 MM HG: CPT | Performed by: INTERNAL MEDICINE

## 2025-07-08 PROCEDURE — 99204 OFFICE O/P NEW MOD 45 MIN: CPT | Mod: GC | Performed by: INTERNAL MEDICINE

## 2025-07-08 PROCEDURE — 84156 ASSAY OF PROTEIN URINE: CPT | Performed by: PATHOLOGY

## 2025-07-08 PROCEDURE — 82306 VITAMIN D 25 HYDROXY: CPT | Performed by: INTERNAL MEDICINE

## 2025-07-08 PROCEDURE — 1126F AMNT PAIN NOTED NONE PRSNT: CPT | Performed by: INTERNAL MEDICINE

## 2025-07-08 PROCEDURE — 3074F SYST BP LT 130 MM HG: CPT | Performed by: INTERNAL MEDICINE

## 2025-07-08 PROCEDURE — 83550 IRON BINDING TEST: CPT | Performed by: PATHOLOGY

## 2025-07-08 PROCEDURE — 82570 ASSAY OF URINE CREATININE: CPT | Performed by: INTERNAL MEDICINE

## 2025-07-08 PROCEDURE — 85027 COMPLETE CBC AUTOMATED: CPT | Performed by: PATHOLOGY

## 2025-07-08 RX ORDER — LOSARTAN POTASSIUM 50 MG/1
50 TABLET ORAL 2 TIMES DAILY
Qty: 90 TABLET | Refills: 11 | Status: SHIPPED | OUTPATIENT
Start: 2025-07-08

## 2025-07-08 RX ORDER — LOSARTAN POTASSIUM 50 MG/1
50 TABLET ORAL 2 TIMES DAILY
Qty: 90 TABLET | Refills: 3 | Status: SHIPPED | OUTPATIENT
Start: 2025-07-08 | End: 2025-07-08

## 2025-07-08 RX ORDER — LOSARTAN POTASSIUM 50 MG/1
50 TABLET ORAL 2 TIMES DAILY
Qty: 90 TABLET | Refills: 11 | Status: SHIPPED | OUTPATIENT
Start: 2025-07-08 | End: 2025-07-08

## 2025-07-08 ASSESSMENT — PAIN SCALES - GENERAL: PAINLEVEL_OUTOF10: NO PAIN (0)
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
## 2025-07-08 NOTE — NURSING NOTE
"Oncology Rooming Note    July 8, 2025 3:39 PM   aMu Gómez is a 70 year old male who presents for:    Chief Complaint   Patient presents with    Oncology Clinic Visit     Chronic myeloid leukemia     Initial Vitals: BP (!) 137/91   Pulse 53   Temp 98.2  F (36.8  C) (Oral)   Resp 16   Ht 1.79 m (5' 10.47\")   Wt 105.2 kg (232 lb)   SpO2 99%   BMI 32.84 kg/m   Estimated body mass index is 32.84 kg/m  as calculated from the following:    Height as of this encounter: 1.79 m (5' 10.47\").    Weight as of this encounter: 105.2 kg (232 lb). Body surface area is 2.29 meters squared.  No Pain (0) Comment: Data Unavailable   No LMP for male patient.  Allergies reviewed: Yes  Medications reviewed: Yes    Medications: Medication refills not needed today.  Pharmacy name entered into Curiously:    [facility] #[id number] - [facility] [address] AT [facility] [address]  [id number] - [facility] [address] [facility] [address]  [address]  [id number] - [facility] [address] [place] [address]  [facility] # [id number] - [place], [place] - [address]  [facility] - [place], [place] - [address]    Frailty Screening:   Is the patient here for a new oncology consult visit in cancer care? 1. Yes. Over the past month, have you experienced difficulty or required a caregiver to assist with:   1. Balance, walking or general mobility (including any falls)? NO  2. Completion of self-care tasks such as bathing, dressing, toileting, grooming/hygiene?  NO  3. Concentration or memory that affects your daily life?  NO     PHQ9:  Did this patient require a PHQ9?: No      Clinical concerns: New pt cristin.       Tatum Hodgson CMA              "
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
## 2025-07-21 DIAGNOSIS — R35.0 URINARY FREQUENCY: ICD-10-CM

## 2025-07-21 RX ORDER — ALFUZOSIN HYDROCHLORIDE 10 MG/1
1 TABLET, EXTENDED RELEASE ORAL DAILY
Qty: 90 TABLET | Refills: 0 | Status: SHIPPED | OUTPATIENT
Start: 2025-07-21

## 2025-09-02 DIAGNOSIS — E78.5 HYPERLIPIDEMIA LDL GOAL <100: ICD-10-CM

## 2025-09-02 RX ORDER — ATORVASTATIN CALCIUM 40 MG/1
40 TABLET, FILM COATED ORAL DAILY
Qty: 90 TABLET | Refills: 0 | Status: SHIPPED | OUTPATIENT
Start: 2025-09-02